# Patient Record
Sex: FEMALE | Race: WHITE | NOT HISPANIC OR LATINO | Employment: OTHER | URBAN - METROPOLITAN AREA
[De-identification: names, ages, dates, MRNs, and addresses within clinical notes are randomized per-mention and may not be internally consistent; named-entity substitution may affect disease eponyms.]

---

## 2017-04-24 ENCOUNTER — ALLSCRIPTS OFFICE VISIT (OUTPATIENT)
Dept: OTHER | Facility: OTHER | Age: 72
End: 2017-04-24

## 2017-05-02 DIAGNOSIS — Z12.31 ENCOUNTER FOR SCREENING MAMMOGRAM FOR MALIGNANT NEOPLASM OF BREAST: ICD-10-CM

## 2017-05-02 DIAGNOSIS — Z13.820 ENCOUNTER FOR SCREENING FOR OSTEOPOROSIS: ICD-10-CM

## 2017-05-02 DIAGNOSIS — E11.9 TYPE 2 DIABETES MELLITUS WITHOUT COMPLICATIONS (HCC): ICD-10-CM

## 2017-05-02 DIAGNOSIS — R41.3 OTHER AMNESIA: ICD-10-CM

## 2017-05-04 ENCOUNTER — GENERIC CONVERSION - ENCOUNTER (OUTPATIENT)
Dept: OTHER | Facility: OTHER | Age: 72
End: 2017-05-04

## 2018-01-01 ENCOUNTER — TELEPHONE (OUTPATIENT)
Dept: FAMILY MEDICINE CLINIC | Facility: CLINIC | Age: 73
End: 2018-01-01

## 2018-01-09 NOTE — PROGRESS NOTES
Assessment    1  DMII (diabetes mellitus, type 2) (250 00) (E11 9)   2  Hypercholesterolemia (272 0) (E78 0)   3  Declined smoking cessation (Z78 9)   4  BMI 29 0-29 9,adult (V85 25) (Z68 29)   5  Essential hypertension (401 9) (I10)   6  Colon cancer screening (V76 51) (Z12 11)   7  Encounter for mammogram to establish baseline mammogram (V76 12) (Z12 31)    Plan   DMII (diabetes mellitus, type 2)    · Lisinopril 20 MG Oral Tablet   · (1) BASIC METABOLIC PROFILE; Status:Active; Requested for:04Mar2016;    · (1) HEMOGLOBIN A1C; Status:Active; Requested for:04Mar2016;    · (1) LIPID PANEL, FASTING; Status:Active; Requested for:04Mar2016;    · (1) MICROALBUMIN CREATININE RATIO, RANDOM URINE; Status:Active; Requested  for:04Mar2016;   Essential hypertension    · Lisinopril-Hydrochlorothiazide 20-12 5 MG Oral Tablet; TAKE 1 TABLET DAILY  Hypercholesterolemia    · Atorvastatin Calcium 80 MG Oral Tablet; take 1 tablet by mouth once daily    Ophthalmology Referral Other Physician Referral  Consult  Status: Hold For - Scheduling  Requested for: [id number]  Ordered; For: DMII (diabetes mellitus, type 2); Ordered By: Gm Chow  Performed:   Due: 56QDW3466; Last Updated By: Gm Chow; 3/4/2016 6:02:55 PM  Podiatry Referral Other Physician Referral  Consult  Status: Hold For - Scheduling  Requested for: [id number]  Ordered; For: DMII (diabetes mellitus, type 2); Ordered By: Gm Chow  Performed:   Due: 09KNO4977; Last Updated By: Gm Chow; 3/4/2016 6:03:42 PM  Gastroenterology Referral Other Physician Referral  Consult  Status: Hold For - Scheduling  Requested for: [id number]  Ordered;    For: Colon cancer screening;  Ordered By: Gm Chow  Performed:   Due: 20GGZ0824; Last Updated By: Gm Chow; 3/4/2016 6:05:03 PM  MAMMO SCREENING RIGHT W CAD; Status:Hold For - Scheduling; Requested for:04Mar2016;   Perform:[facility]; HWS:[id number];ORILEUGENIA;    [place] for mammogram to establish baseline mammogram; Ordered By:Herminia Yuan;      Discussion/Summary  health maintenance visit Currently, she eats an adequate diet  the patient declines cervical cancer screening Breast cancer screening: mammogram has been ordered  Colorectal cancer screening: colonoscopy has been ordered  Screening lab work includes lipid profile  The immunizations are up to date  She was advised to be evaluated by an ophthalmologist  Advice and education were given regarding nutrition and tobacco cessation  #Hypertension- stop lisinopril 20 mg and Lopressor, recheck /90, started on Lisinopril-HCTZ 20-12 5mg, follow up in 2-3 weeks for BP    #Type 2 DM- ordered HbA1C,last HbA1C 6 5 in April 2015, ordered BMP, lipid panel, urine microalbumin    #Hyperlipidemia- refilled atorvastatin    # Colon cancer screening- referral for GI  # Breast cancer screening- referral for mammogram  #Declined smoking cessation     Chief Complaint  CPE      History of Present Illness  HPI: 78year old female with history of Type 2 DM, non-Hodgkin lymphoma, HTN, and hyperlipidemia who is here for annual physical exam  She is did not bring her blood sugar log, but states ranges from 80-130s  She has not had eye or foot exam this year  She checks her feet daily, denies lacerations  She has not taken her Lopressor but has been taking her lisinopril 20mg  Today BP is elevated  She denies headache, vision changes,chest pain, or SOB  Review of Systems    Constitutional: no fever and no chills  ENT: no earache  Cardiovascular: no chest pain and no palpitations  Respiratory: no shortness of breath, no cough and no wheezing  Gastrointestinal: no abdominal pain, no nausea and no diarrhea  Genitourinary: no dysuria  Musculoskeletal: no arthralgias and no myalgias  Integumentary: no rashes  Neurological: no headache  Active Problems    1  Acute myocardial infarction of inferior wall (410 40) (I21 19)   2  Arthralgia of temporomandibular joint (524 62) (M26 62)   3  Asthma (493 90) (J45 909)   4  Chronic obstructive pulmonary disease (496) (J44 9)   5  Dependence On Nicotine In Chewing Tobacco With Nicotine-induced Disorder (305 1)   6  DMII (diabetes mellitus, type 2) (250 00) (E11 9)   7  Edema (782 3) (R60 9)   8  Emphysema (492 8) (J43 9)   9  Essential hypertension (401 9) (I10)   10  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)   11  Hypercholesterolemia (272 0) (E78 0)   12  Hyperkalemia (276 7) (E87 5)   13  Hyperlipidemia (272 4) (E78 5)   14  Malignant Female Breast Neoplasm (174 9)   15  Need for prophylactic vaccination and inoculation against influenza (V04 81) (Z23)   16  Need for vaccination with 13-polyvalent pneumococcal conjugate vaccine (V03 82) (Z23)   17  Normal routine physical examination (V70 0) (Z00 00)   18  Other muscle spasm (728 85) (M62 838)   19  Presenile dementia, depressed type (290 13) (F03 90)   20  Screening for cardiovascular condition (V81 2) (Z13 6)   21  Urge incontinence of urine (788 31) (N39 41)   22  Xerosis cutis (706 8) (L85 3)    Past Medical History    · History of bacterial pneumonia (V12 61) (Z87 01)   · History of bronchitis (V12 69) (Z87 09)   · History of urinary tract infection (V13 02) (Z87 440)   · Need for prophylactic vaccination and inoculation against influenza (V04 81) (Z23)    Surgical History    · History of Breast Surgery Mastectomy    Family History    · Family history of Heart disease    · Family history of Heart disease    Social History    · Current every day smoker (305 1) (F17 200)    Current Meds   1  Albuterol 90 MCG/ACT AERS; INHALE 1 TO 2 PUFFS EVERY 4 TO 6 HOURS AS   NEEDED AND AS DIRECTED; Therapy: (Recorded:31Cvr9713) to Recorded   2  Atorvastatin Calcium 80 MG Oral Tablet; take 1 tablet by mouth once daily; Therapy: 97KTR5888 to (Evaluate:25Mar2016)  Requested for: 49Tvy9157; Last   Rx:59Ibz9877 Ordered   3   Combivent  MCG/ACT AERO; INHALE 1 PUFFS Twice daily; Therapy: [phone]) to Recorded   4  FreeStyle Lite Test STRP; TEST 3 TIMES DAILY; Therapy: [phone]) to Recorded   5  Lisinopril 20 MG Oral Tablet; take 1 tablet by mouth once daily; Therapy: 43RKQ8028 to (Evaluate:16Mar2016)  Requested for: 05PDB2470; Last   Rx:17Nwk0081 Ordered   6  MetFORMIN HCl - 850 MG Oral Tablet; take 1 tablet by mouth twice a day with food; Therapy: 23LYR3505 to (Santiago Serve)  Requested for: 12VJJ5083; Last   Rx:08Jan2016 Ordered   7  RA Aspirin Adult Low Strength 81 MG Oral Tablet Chewable; Take 1 tablet twice daily; Therapy: [phone]) to Recorded   8  Ventolin  (90 Base) MCG/ACT Inhalation Aerosol Solution; INHALE 2 PUFFS   EVERY 4 HOURS AS NEEDED FOR COUGH AND WHEEZE;   Therapy: 89ZQW3575 to (Last Rx:46Juy0037) Ordered    Allergies    1  Percocet TABS    2  No Known Latex Allergies    Vitals   Recorded: 96BHB9026 02:38PM   Temperature 97 4 F   Heart Rate 61   Respiration 18   Systolic 337   Diastolic 90   Height 4 ft 11 in   Weight 148 lb    BMI Calculated 29 89   BSA Calculated 1 62   O2 Saturation 96     Physical Exam    Constitutional   General appearance: No acute distress, well appearing and well nourished  Head and Face   Head and face: Normal     Eyes   Conjunctiva and lids: No swelling, erythema or discharge  Pupils and irises: Equal, round, reactive to light  Ears, Nose, Mouth, and Throat   External inspection of ears and nose: Normal     Otoscopic examination: Tympanic membranes translucent with normal light reflex  Canals patent without erythema  Lips, teeth, and gums: Normal, good dentition  Oropharynx: Normal with no erythema, edema, exudate or lesions  Pulmonary   Respiratory effort: No increased work of breathing or signs of respiratory distress  Auscultation of lungs: Clear to auscultation      Cardiovascular   Auscultation of heart: Normal rate and rhythm, normal S1 and S2, no murmurs  Examination of extremities for edema and/or varicosities: Normal     Abdomen   Abdomen: Non-tender, no masses  Liver and spleen: No hepatomegaly or splenomegaly  Musculoskeletal   Gait and station: Normal     Digits and nails: Normal without clubbing or cyanosis  Joints, bones, and muscles: Normal     Range of motion: Normal     Stability: Normal     Muscle strength/tone: Normal     Psychiatric   Orientation to person, place, and time: Normal     Mood and affect: Normal        Attending Note  Attending Note: Attending Note: I did not interview and examine the patient, I discussed the case with the Resident and reviewed the Resident's note, I supervised the Resident and I agree with the Resident management plan as it was presented to me  Level of Participation: I was present in clinic, but did not examine the patient  Patient's History: 78 y o  female here for f/u of chronic problems (type 2 DM and HTN), and for health maintenance  Not taking Lopressor  Key Parts of the Exam: As per resident's note  Diagnosis and Plan: HTN- BP elevated today, but clinically stable  Add HCTZ to lisinopril as noted above  Re-check BP wihtin one month  DM- clinically stable; labs as above (await results) and ophthalmology visit suggested  Health Maint - mammogram, colon screening as noted above  I agree with the Resident's note  Future Appointments    Date/Time Provider Specialty Site   03/29/2016 11:45 AM Malva Leventhal, M D  Gastroenterology Adult [facility] GASTROENTEROLOGY [address] [place] [address]   Electronically signed by : Ramo Cote DO; Mar  5 2016  1:36PM EST                       (Author)    Electronically signed by :  EFREN Shultz ; Mar  6 2016  3:35PM EST                       (Co-author)

## 2018-01-11 NOTE — RESULT NOTES
Verified Results  (1) BASIC METABOLIC PROFILE [id number] 08:49AM KirstenThirdLovela Canvas Order Number: [id number]      National Kidney Disease Education Program recommendations are as follows:  GFR calculation is accurate only with a steady state creatinine  Chronic Kidney disease less than 60 ml/min/1 73 sq  meters  Kidney failure less than 15 ml/min/1 73 sq  meters  Test Name Result Flag Reference   GLUCOSE,RANDM 93 mg/dL     If the patient is fasting, the ADA then defines impaired fasting glucose as > 100 mg/dL and diabetes as > or equal to 123 mg/dL     SODIUM 139 mmol/L  136-145   POTASSIUM 5 2 mmol/L  3 5-5 3   CHLORIDE 106 mmol/L  100-108   CARBON DIOXIDE 25 mmol/L  21-32   ANION GAP (CALC) 8 mmol/L  4-13   BLOOD UREA NITROGEN 11 mg/dL  5-25   CREATININE 0 73 mg/dL  0 60-1 30   Standardized to IDMS reference method   CALCIUM 9 3 mg/dL  8 3-10 1   eGFR Non-African American      >60 0 ml/min/1 73sq m     (1) LIPID PANEL, FASTING 58PCV1378 08:49AM KirstenYo que Vos Order Number: [id number]      Triglyceride:         Normal              <150 mg/dl       Borderline High    150-199 mg/dl       High               200-499 mg/dl       Very High          >499 mg/dl  Cholesterol:         Desirable        <200 mg/dl      Borderline High  200-239 mg/dl      High             >239 mg/dl  HDL Cholesterol:        High    >59 mg/dL      Low     <41 mg/dL     Test Name Result Flag Reference   CHOLESTEROL 132 mg/dL     HDL,DIRECT 44 mg/dL  40-60   LDL CHOLESTEROL CALCULATED 66 mg/dL  0-100   TRIGLYCERIDES 111 mg/dL  <=150     (1) HEMOGLOBIN A1C 45GRX7925 08:49AM [employer] Order Number: [id number]      5 7-6 4% impaired fasting glucose  >=6 5% diagnosis of diabetes    Falsely low levels are seen in conditions linked to short RBC life span-  hemolytic anemia, and splenomegaly  Falsely elevated levels are seen in situations where there is an increased production of RBC- receipt of erythropoietin or blood transfusions  Adopted from ADA-Clinical Practice Recommendations     Test Name Result Flag Reference   HEMOGLOBIN A1C 6 6 % H 4 0-5 6   EST  AVG   GLUCOSE 143 mg/dl       (1) MICROALBUMIN CREATININE RATIO, RANDOM URINE [id number] 08:49AM Brittany Parr   TW Order Number: [id number]     Test Name Result Flag Reference   MICROALBUMIN/ CREAT R <43 mg/g creatinine H 0-30   MICROALBUMIN,URINE <5 0 mg/L  0 0-20 0   CREATININE URINE 11 5 mg/dL

## 2018-01-12 NOTE — MISCELLANEOUS
Message  Message Free Text Note Form: Attempted to call patient twice with no answer  Could not leave a message as no voicemail available  Signatures   Electronically signed by : Flor Dinero DO; Mar 14 2016  1:24PM EST                       (Author)    Electronically signed by :  EFREN Sanchez ; Mar 14 2016  1:47PM EST                       (Acknowledgement)

## 2018-01-13 VITALS
DIASTOLIC BLOOD PRESSURE: 100 MMHG | SYSTOLIC BLOOD PRESSURE: 140 MMHG | RESPIRATION RATE: 18 BRPM | WEIGHT: 150.5 LBS | OXYGEN SATURATION: 95 % | HEIGHT: 59 IN | HEART RATE: 51 BPM | BODY MASS INDEX: 30.34 KG/M2

## 2018-01-16 NOTE — MISCELLANEOUS
Message   Recorded as Task   Date: 04/25/2017 09:44 AM, Created By: 1634 Marcos Lau   Task Name: Miscellaneous   Assigned To: Neal Rodriguez   Regarding Patient: Diana Stearns, Status: In Progress   Comment:    Alix Maurice - 25 Apr 2017 9:44 AM     TASK CREATED  Ace Lundborg,   I saw this pt on 4/24  She was scheduled for a 15 min appoinment, had multiple issues and on top of it allscripts wasn't working  I did the best I could, but she's coming back to see you on 5/4  Reba Orn have to give her all the scripts I was unable to give her including- CBC, CMP, TSH, RPR, U/A, Lipid Panel, HbA1c, mammography, colonoscopy, DEXA  My note has all other info  Let me know if you have any questions! Emma Gaston - 27 Apr 2017 3:44 PM     TASK EDITED   Jordan Perera - 27 Apr 2017 3:44 PM     TASK IN PROGRESS   Neal Rodriguez - 02 May 2017 4:40 PM     TASK EDITED  Printed required labs and screening prior to appointment   Jordan Perera - 04 May 2017 1:41 PM     TASK EDITED  Patient canceled appointment for today and did not reschedule  Will copy note to chart for future provider          Signatures   Electronically signed by : EFREN Knight ; May  4 2017  1:41PM EST                       (Author)

## 2018-03-30 DIAGNOSIS — E78.5 HYPERLIPIDEMIA, UNSPECIFIED HYPERLIPIDEMIA TYPE: Primary | ICD-10-CM

## 2018-04-01 RX ORDER — ATORVASTATIN CALCIUM 80 MG/1
80 TABLET, FILM COATED ORAL DAILY
Qty: 90 TABLET | Refills: 0 | Status: SHIPPED | OUTPATIENT
Start: 2018-04-01 | End: 2018-09-10 | Stop reason: SDUPTHER

## 2018-09-10 ENCOUNTER — OFFICE VISIT (OUTPATIENT)
Dept: FAMILY MEDICINE CLINIC | Facility: CLINIC | Age: 73
End: 2018-09-10
Payer: MEDICARE

## 2018-09-10 VITALS
WEIGHT: 144 LBS | OXYGEN SATURATION: 95 % | BODY MASS INDEX: 29.08 KG/M2 | DIASTOLIC BLOOD PRESSURE: 86 MMHG | HEART RATE: 78 BPM | RESPIRATION RATE: 20 BRPM | SYSTOLIC BLOOD PRESSURE: 158 MMHG

## 2018-09-10 DIAGNOSIS — I10 HYPERTENSION, UNSPECIFIED TYPE: ICD-10-CM

## 2018-09-10 DIAGNOSIS — E78.5 HYPERLIPIDEMIA, UNSPECIFIED HYPERLIPIDEMIA TYPE: ICD-10-CM

## 2018-09-10 DIAGNOSIS — J44.9 CHRONIC OBSTRUCTIVE PULMONARY DISEASE, UNSPECIFIED COPD TYPE (HCC): ICD-10-CM

## 2018-09-10 DIAGNOSIS — E11.8 TYPE 2 DIABETES MELLITUS WITH COMPLICATION, WITHOUT LONG-TERM CURRENT USE OF INSULIN (HCC): Primary | ICD-10-CM

## 2018-09-10 PROCEDURE — 99213 OFFICE O/P EST LOW 20 MIN: CPT | Performed by: FAMILY MEDICINE

## 2018-09-10 RX ORDER — ALBUTEROL SULFATE 90 UG/1
1-2 AEROSOL, METERED RESPIRATORY (INHALATION)
COMMUNITY
End: 2018-09-10 | Stop reason: SDUPTHER

## 2018-09-10 RX ORDER — ALBUTEROL SULFATE 90 UG/1
1-2 AEROSOL, METERED RESPIRATORY (INHALATION) EVERY 6 HOURS PRN
Qty: 1 INHALER | Refills: 0 | Status: SHIPPED | OUTPATIENT
Start: 2018-09-10 | End: 2019-01-01 | Stop reason: HOSPADM

## 2018-09-10 RX ORDER — LISINOPRIL 20 MG/1
20 TABLET ORAL DAILY
Qty: 90 TABLET | Refills: 0 | Status: SHIPPED | OUTPATIENT
Start: 2018-09-10 | End: 2019-01-01 | Stop reason: SDUPTHER

## 2018-09-10 RX ORDER — ATORVASTATIN CALCIUM 80 MG/1
80 TABLET, FILM COATED ORAL DAILY
Qty: 90 TABLET | Refills: 0 | Status: SHIPPED | OUTPATIENT
Start: 2018-09-10 | End: 2019-01-01 | Stop reason: SDUPTHER

## 2018-09-10 NOTE — PROGRESS NOTES
Assessment/Plan:       Diagnoses and all orders for this visit:    Type 2 diabetes mellitus with complication, without long-term current use of insulin (HCC)  -     HEMOGLOBIN A1C W/ EAG ESTIMATION; Future  -     metFORMIN (GLUCOPHAGE) 850 mg tablet; Take 1 tablet (850 mg total) by mouth 2 (two) times a day with meals  -     Ambulatory referral to Podiatry; Future  - Counseled on importance of diabetic diet and exercise  Hyperlipidemia, unspecified hyperlipidemia type  -     Lipid Panel with Direct LDL reflex; Future  -     atorvastatin (LIPITOR) 80 mg tablet; Take 1 tablet (80 mg total) by mouth daily    Hypertension, unspecified type  -     lisinopril (ZESTRIL) 20 mg tablet; Take 1 tablet (20 mg total) by mouth daily  - Counseled on DASH diet as well as exercise  Chronic obstructive pulmonary disease, unspecified COPD type ([facility])  -     albuterol (PROVENTIL HFA,VENTOLIN HFA) 90 mcg/act inhaler; Inhale 1-2 puffs every 6 (six) hours as needed for wheezing  -     ipratropium-albuterol (COMBIVENT RESPIMAT) inhaler; Inhale 1 puff 2 (two) times a day  - Counseled in detail about benefits of smoking cessation  Subjective:      Patient ID: Jaqui Rojo is a 72 y o  female  HPI  Pamela Ross is a 74 yo F with PMH of breast cancer s/p mastectomy of L breast 13  Years ago, Hodgkins lymphoma in remission, HTN, DM, COPD, HLD who presents today for follow up of her chronic conditions  Pt is here today with her son  # Type II DM: Pt is currently on Metformin 850mg BID  Last HgbA1c 3/17/16 was 6 6  Microalb Cr ratio done at the same time was <43  Pt's blood sugars at night 30 min after dinner are 140s-160s  Has not had eye or foot examination done  Diet/Exercise: Diet consists mainly of carbohydrates  Pt does not exercise    # HLD/CAD: Last lipid panel was done on 3/17/16 and was WNL  Pt states she is currently taking Atorvastatin 80mg qd  Had a stent >10 years ago   Takes daily baby ASA    # HTN: BP in office today is 158/86  She is currently on Lisinopril 20mg daily  Compliant with medications    # COPD: Current every day smoker since she was 27years old, 1 ppd  Has tried to quit in the past, but has been unsuccessful  States she does not want to quit at this time  Understands risks  Was prescribed Combivent and rescue inhaler years ago, but never uses them  Reports no SOB, wheezing, chest tightness  The following portions of the patient's history were reviewed and updated as appropriate: allergies, current medications, past family history, past medical history, past social history, past surgical history and problem list     Review of Systems   Constitutional: Negative for activity change, appetite change, chills, diaphoresis, fatigue and fever  HENT: Negative  Respiratory: Negative for cough, choking, chest tightness, shortness of breath and wheezing  Cardiovascular: Negative for chest pain, palpitations and leg swelling  Gastrointestinal: Negative for abdominal distention, abdominal pain, constipation, diarrhea, nausea and vomiting  Genitourinary: Negative for difficulty urinating, dyspareunia, dysuria, enuresis, flank pain, frequency, menstrual problem, pelvic pain and urgency  Musculoskeletal: Negative for arthralgias, back pain, gait problem, joint swelling, myalgias, neck pain and neck stiffness  Skin: Negative  Neurological: Negative for dizziness, tremors, syncope, facial asymmetry, weakness, light-headedness, numbness and headaches  Psychiatric/Behavioral: Negative  Objective:      /86   Pulse 78   Resp 20   Wt 65 3 kg (144 lb)   SpO2 95%   BMI 29 08 kg/m²          Physical Exam   Constitutional: She is oriented to person, place, and time  She appears well-developed and well-nourished  No distress  HENT:   Head: Normocephalic and atraumatic  Cardiovascular: Normal rate, regular rhythm, normal heart sounds and intact distal pulses    Exam reveals no gallop and no friction rub  No murmur heard  Pulmonary/Chest: Effort normal  No respiratory distress  She has wheezes  She has no rales  She exhibits no tenderness  Musculoskeletal: Normal range of motion  She exhibits no edema, tenderness or deformity  Neurological: She is alert and oriented to person, place, and time  Skin: She is not diaphoretic  Psychiatric: She has a normal mood and affect   Her behavior is normal  Judgment and thought content normal

## 2018-09-10 NOTE — PROGRESS NOTES
I have reviewed the notes, assessments, and/or procedures performed by the resident, I concur with her/his documentation of Vashti Calvillo  Pt seen with resident and I agree with diagnosis and treatment  Pt encouraged to quit smoking  BW ordered

## 2018-09-11 ENCOUNTER — APPOINTMENT (OUTPATIENT)
Dept: LAB | Facility: HOSPITAL | Age: 73
End: 2018-09-11
Payer: MEDICARE

## 2018-09-11 ENCOUNTER — TRANSCRIBE ORDERS (OUTPATIENT)
Dept: ADMINISTRATIVE | Facility: HOSPITAL | Age: 73
End: 2018-09-11

## 2018-09-11 DIAGNOSIS — E11.8 TYPE 2 DIABETES MELLITUS WITH COMPLICATION, WITHOUT LONG-TERM CURRENT USE OF INSULIN (HCC): ICD-10-CM

## 2018-09-11 DIAGNOSIS — E78.5 HYPERLIPIDEMIA, UNSPECIFIED HYPERLIPIDEMIA TYPE: ICD-10-CM

## 2018-09-11 LAB
CHOLEST SERPL-MCNC: 120 MG/DL (ref 50–200)
EST. AVERAGE GLUCOSE BLD GHB EST-MCNC: 137 MG/DL
HBA1C MFR BLD: 6.4 % (ref 4.2–6.3)
HDLC SERPL-MCNC: 35 MG/DL (ref 40–60)
LDLC SERPL CALC-MCNC: 60 MG/DL (ref 0–100)
TRIGL SERPL-MCNC: 126 MG/DL

## 2018-09-11 PROCEDURE — 36415 COLL VENOUS BLD VENIPUNCTURE: CPT

## 2018-09-11 PROCEDURE — 83036 HEMOGLOBIN GLYCOSYLATED A1C: CPT

## 2018-09-11 PROCEDURE — 80061 LIPID PANEL: CPT

## 2018-09-25 ENCOUNTER — OFFICE VISIT (OUTPATIENT)
Dept: PODIATRY | Facility: CLINIC | Age: 73
End: 2018-09-25
Payer: MEDICARE

## 2018-09-25 VITALS
DIASTOLIC BLOOD PRESSURE: 87 MMHG | BODY MASS INDEX: 29.64 KG/M2 | WEIGHT: 151 LBS | HEIGHT: 60 IN | SYSTOLIC BLOOD PRESSURE: 180 MMHG

## 2018-09-25 DIAGNOSIS — G47.62 NOCTURNAL LEG CRAMPS: Primary | ICD-10-CM

## 2018-09-25 DIAGNOSIS — B35.1 ONYCHOMYCOSIS: ICD-10-CM

## 2018-09-25 DIAGNOSIS — E11.42 DIABETIC POLYNEUROPATHY ASSOCIATED WITH TYPE 2 DIABETES MELLITUS (HCC): ICD-10-CM

## 2018-09-25 DIAGNOSIS — L60.0 INGROWN TOENAIL: ICD-10-CM

## 2018-09-25 PROCEDURE — 99203 OFFICE O/P NEW LOW 30 MIN: CPT | Performed by: PODIATRIST

## 2018-09-25 NOTE — PROGRESS NOTES
Assessment/Plan:    Aseptic debridement and planning of nails x10 and manually and mechanically  Discussed importance of staying well hydrated patient will take multivitamin  Discussed importance of aerobic exercise to improve circulation  Daily foot checks monitor for signs of ulceration or infection  Discussed proper shoes, importance of not walking barefoot  Follow-up 3 months     Diagnoses and all orders for this visit:    Nocturnal leg cramps    Diabetic polyneuropathy associated with type 2 diabetes mellitus (HCC)    Onychomycosis    Ingrown toenail          Subjective:      Patient ID: Monica Anthony is a 72 y o  female  Patient has a 10 year history well controlled diabetes  Last A1c 6 4  Patient gets occasional tingling and burning in feet  The patient comes in with concerned about cramping in legs patient says it happened a couple times at night in the past month but happens infrequently  Patient continues to smoke  Patient has thick discolored dystrophic nails  Patient gets recurrent ingrown nails bilateral big toes  Patient has not noticed any redness or signs of infection  Patient does not exercise regularly  Patient denies any history of foot wound or infection  Past Medical History:   Diagnosis Date    Bacterial pneumonia     Last Assessed: 11/14/2013    Breast cancer (HCC)     hx    [facility]     Hyperlipidemia     Hypertension        Past Surgical History:   Procedure Laterality Date    COMPLETE MASTECTOMY W/ SENTINEL NODE BIOPSY Left     Resolved: 6/19/2007       Allergies   Allergen Reactions    Percocet [Oxycodone-Acetaminophen]          Current Outpatient Prescriptions:     albuterol (PROVENTIL HFA,VENTOLIN HFA) 90 mcg/act inhaler, Inhale 1-2 puffs every 6 (six) hours as needed for wheezing, Disp: 1 Inhaler, Rfl: 0    aspirin 81 MG tablet, Take 81 mg by mouth daily  , Disp: , Rfl:     atorvastatin (LIPITOR) 80 mg tablet, Take 1 tablet (80 mg total) by mouth daily, Disp: 90 tablet, Rfl: 0    ipratropium-albuterol (COMBIVENT RESPIMAT) inhaler, Inhale 1 puff 2 (two) times a day, Disp: 4 g, Rfl: 0    lisinopril (ZESTRIL) 20 mg tablet, Take 1 tablet (20 mg total) by mouth daily, Disp: 90 tablet, Rfl: 0    metFORMIN (GLUCOPHAGE) 850 mg tablet, Take 1 tablet (850 mg total) by mouth 2 (two) times a day with meals, Disp: 90 tablet, Rfl: 3    There is no problem list on file for this patient  Review of Systems   Constitutional: Negative  HENT: Negative  Eyes: Negative  Respiratory: Negative  Cardiovascular: Negative  Gastrointestinal: Negative  Endocrine: Negative  Genitourinary: Negative  Musculoskeletal: Negative  Skin: Negative  Allergic/Immunologic: Negative  Neurological: Negative  Hematological: Negative  Psychiatric/Behavioral: Negative  Objective:  Patient's shoes and socks were removed, feet examined  BP (!) 180/87   Ht 4' 11 5" (1 511 m)   Wt 68 5 kg (151 lb)   BMI 29 99 kg/m²          Physical Exam   Cardiovascular:   Pulses:       Dorsalis pedis pulses are 1+ on the right side, and 1+ on the left side  Posterior tibial pulses are 1+ on the right side, and 1+ on the left side  Feet:   Right Foot:   Skin Integrity: Positive for callus  Left Foot:   Skin Integrity: Positive for callus  Foot Exam    General  General Appearance: appears stated age and healthy   Orientation: alert and oriented to person, place, and time   Affect: appropriate   Gait: unimpaired       Right Foot/Ankle     Inspection and Palpation  Skin Exam: callus; Neurovascular  Dorsalis pedis: 1+  Posterior tibial: 1+      Left Foot/Ankle      Inspection and Palpation  Skin Exam: callus; Neurovascular  Dorsalis pedis: 1+  Posterior tibial: 1+            Patient's shoes and socks removed  Right Foot/Ankle   Right Foot Inspection  Skin Exam: callus and callus                            Sensory   Vibration: diminished  Proprioception: intact   Monofilament testing: intact  Vascular  Capillary refills: < 3 seconds  The right DP pulse is 1+  The right PT pulse is 1+  Left Foot/Ankle  Left Foot Inspection  Skin Exam: callus                                         Sensory   Vibration: diminished  Proprioception: intact  Monofilament: intact  Vascular  Capillary refills: < 3 seconds  The left DP pulse is 1+  The left PT pulse is 1+  Assign Risk Category:  Deformity present;  Loss of protective sensation;        Risk: 2    All nails thick discolored dystrophic positive subungual debris positive pain on palpation  Mild ingrown bilateral hallux no sign of infection no exudate  Mild venous stasis mild swelling negative calf tenderness negative Homans sign  Flatfoot bilateral  Equinus bilateral

## 2018-10-22 ENCOUNTER — OFFICE VISIT (OUTPATIENT)
Dept: FAMILY MEDICINE CLINIC | Facility: CLINIC | Age: 73
End: 2018-10-22
Payer: MEDICARE

## 2018-10-22 VITALS
WEIGHT: 142.9 LBS | HEART RATE: 49 BPM | SYSTOLIC BLOOD PRESSURE: 170 MMHG | HEIGHT: 59 IN | OXYGEN SATURATION: 94 % | TEMPERATURE: 97.8 F | BODY MASS INDEX: 28.81 KG/M2 | DIASTOLIC BLOOD PRESSURE: 100 MMHG

## 2018-10-22 DIAGNOSIS — E11.8 TYPE 2 DIABETES MELLITUS WITH COMPLICATION, WITHOUT LONG-TERM CURRENT USE OF INSULIN (HCC): ICD-10-CM

## 2018-10-22 PROCEDURE — 99213 OFFICE O/P EST LOW 20 MIN: CPT | Performed by: FAMILY MEDICINE

## 2018-10-22 RX ORDER — A/SINGAPORE/GP1908/2015 IVR-180 (H1N1) (AN A/MICHIGAN/45/2015 (H1N1)PDM09-LIKE VIRUS), A/HONG KONG/4801/2014, NYMC X-263B (H3N2) (AN A/HONG KONG/4801/2014-LIKE VIRUS), AND B/BRISBANE/60/2008, WILD TYPE (A B/BRISBANE/60/2008-LIKE VIRUS) 15; 15; 15 UG/.5ML; UG/.5ML; UG/.5ML
INJECTION, SUSPENSION INTRAMUSCULAR
Refills: 0 | COMMUNITY
Start: 2018-09-27 | End: 2018-10-29 | Stop reason: ALTCHOICE

## 2018-10-22 NOTE — PROGRESS NOTES
I have reviewed the notes, assessments, and/or procedures performed by the resident, I concur with her/his documentation of Lucia Guevara

## 2018-10-22 NOTE — PROGRESS NOTES
Assessment/Plan:     Diagnoses and all orders for this visit:    Type 2 diabetes mellitus with complication, without long-term current use of insulin (HCC)  -     metFORMIN (GLUCOPHAGE) 500 mg tablet; Take 1 tablet (500 mg total) by mouth daily with breakfast  - Discussed importance of continuing to monitor blood sugars    - Pt and son told to come back next week with their glucometer to assess if it is functioning currently as there have been very low blood sugar readings despite pt being asymptomatic other than minor fatigue  Express understanding  Subjective:      Patient ID: Ashley Fagan is a 72 y o  female  HPI  Brady Chen is a 73 yo F with PMH of well controlled type II DM, hyperlipidemia, hypertension, COPD who presents today to follow up on diabetes mellitus  Type II DM: Pt is currently prescribed Metformin 850mg BID, but due to low blood sugars on home checks, her son has been giving her only 850mg daily  Last hgbA1c on 9/11/18 was very slightly elevated at 6 4 which is improved from prior A1c  Per son who keeps a very detailed log, pt's blood sugars in the AM before meals have been 30's to 60's prior to cutting down Metformin  During these hypoglycemic episodes, pt is completely asymptomatic  Since altering Metformin dosage to 850mg daily, AM sugars are 109-115  Up to date with foot exam  Has not gotten eye exam done yet  Diet: Pt has been working on improving her diet  Eats better portion sizes, but not too little  Watching her carbohydrate intake  Eats cupcakes occasionally  This morning for breakfast ate a bowl of Cheerios and a cupcake  Does not exercise  Hypertension: BP in office today is elevated at 170/100, repeat level decreased at 148/70  Pt is currently on Lisinopril 20mg daily and is compliant with medication  Home blood pressure logs have all blood pressures WNL   Pt states that her BP is elevated in the doctors office since she is nervous, but otherwise always fine      The following portions of the patient's history were reviewed and updated as appropriate: allergies, current medications, past family history, past medical history, past social history, past surgical history and problem list     Review of Systems   Constitutional: Negative for activity change, appetite change, chills, diaphoresis, fatigue and fever  HENT: Negative  Respiratory: Negative for cough, choking, chest tightness, shortness of breath and wheezing  Cardiovascular: Negative for chest pain, palpitations and leg swelling  Gastrointestinal: Negative for abdominal distention, abdominal pain, constipation, diarrhea, nausea and vomiting  Genitourinary: Negative for difficulty urinating, dyspareunia, dysuria, enuresis, flank pain, frequency, menstrual problem, pelvic pain and urgency  Musculoskeletal: Negative for arthralgias, back pain, gait problem, joint swelling, myalgias, neck pain and neck stiffness  Skin: Negative  Neurological: Negative for dizziness, tremors, syncope, facial asymmetry, weakness, light-headedness, numbness and headaches  Psychiatric/Behavioral: Negative  Objective:      /100 (BP Location: Right arm, Patient Position: Sitting, Cuff Size: Large)   Pulse (!) 49   Temp 97 8 °F (36 6 °C) (Tympanic)   Ht 4' 11" (1 499 m)   Wt 64 8 kg (142 lb 14 4 oz)   SpO2 94%   BMI 28 86 kg/m²          Physical Exam   Constitutional: She is oriented to person, place, and time  She appears well-developed and well-nourished  No distress  HENT:   Head: Normocephalic and atraumatic  Cardiovascular: Normal rate, regular rhythm, normal heart sounds and intact distal pulses  Exam reveals no gallop and no friction rub  No murmur heard  Pulmonary/Chest: Effort normal and breath sounds normal  No respiratory distress  She has no wheezes  She has no rales  She exhibits no tenderness  Neurological: She is alert and oriented to person, place, and time     Skin: She is not diaphoretic  Psychiatric: She has a normal mood and affect   Her behavior is normal  Judgment and thought content normal

## 2018-10-29 PROBLEM — Z12.31 ENCOUNTER FOR SCREENING MAMMOGRAM FOR BREAST CANCER: Status: ACTIVE | Noted: 2018-10-29

## 2018-10-29 PROBLEM — Z71.89 ADVANCED DIRECTIVES, COUNSELING/DISCUSSION: Status: ACTIVE | Noted: 2018-10-29

## 2018-10-29 PROBLEM — Z71.6 ENCOUNTER FOR SMOKING CESSATION COUNSELING: Status: ACTIVE | Noted: 2018-10-29

## 2018-10-29 PROBLEM — Z13.5 GLAUCOMA SCREENING: Status: ACTIVE | Noted: 2018-10-29

## 2018-10-29 PROBLEM — Z12.11 COLON CANCER SCREENING: Status: ACTIVE | Noted: 2018-10-29

## 2018-10-29 PROBLEM — Z00.00 MEDICARE ANNUAL WELLNESS VISIT, SUBSEQUENT: Status: ACTIVE | Noted: 2018-10-29

## 2018-10-29 PROBLEM — R32 URINARY INCONTINENCE: Status: ACTIVE | Noted: 2018-10-29

## 2019-01-01 ENCOUNTER — APPOINTMENT (INPATIENT)
Dept: RADIOLOGY | Facility: HOSPITAL | Age: 74
DRG: 841 | End: 2019-01-01
Payer: MEDICARE

## 2019-01-01 ENCOUNTER — APPOINTMENT (INPATIENT)
Dept: NEUROLOGY | Facility: CLINIC | Age: 74
DRG: 070 | End: 2019-01-01
Payer: MEDICARE

## 2019-01-01 ENCOUNTER — HOSPITAL ENCOUNTER (INPATIENT)
Facility: HOSPITAL | Age: 74
LOS: 1 days | DRG: 841 | End: 2019-11-13
Attending: EMERGENCY MEDICINE | Admitting: FAMILY MEDICINE
Payer: MEDICARE

## 2019-01-01 ENCOUNTER — OFFICE VISIT (OUTPATIENT)
Dept: FAMILY MEDICINE CLINIC | Facility: CLINIC | Age: 74
End: 2019-01-01
Payer: MEDICARE

## 2019-01-01 ENCOUNTER — APPOINTMENT (INPATIENT)
Dept: RADIOLOGY | Facility: HOSPITAL | Age: 74
DRG: 070 | End: 2019-01-01
Payer: MEDICARE

## 2019-01-01 ENCOUNTER — TELEPHONE (OUTPATIENT)
Dept: NEUROSURGERY | Facility: CLINIC | Age: 74
End: 2019-01-01

## 2019-01-01 ENCOUNTER — APPOINTMENT (EMERGENCY)
Dept: RADIOLOGY | Facility: HOSPITAL | Age: 74
DRG: 841 | End: 2019-01-01
Payer: MEDICARE

## 2019-01-01 ENCOUNTER — HOSPITAL ENCOUNTER (INPATIENT)
Facility: HOSPITAL | Age: 74
LOS: 6 days | DRG: 070 | End: 2019-11-19
Attending: FAMILY MEDICINE | Admitting: FAMILY MEDICINE
Payer: MEDICARE

## 2019-01-01 ENCOUNTER — APPOINTMENT (INPATIENT)
Dept: NEUROLOGY | Facility: HOSPITAL | Age: 74
DRG: 841 | End: 2019-01-01
Attending: PSYCHIATRY & NEUROLOGY
Payer: MEDICARE

## 2019-01-01 ENCOUNTER — APPOINTMENT (INPATIENT)
Dept: NON INVASIVE DIAGNOSTICS | Facility: HOSPITAL | Age: 74
DRG: 070 | End: 2019-01-01
Payer: MEDICARE

## 2019-01-01 ENCOUNTER — HOSPITAL ENCOUNTER (EMERGENCY)
Facility: HOSPITAL | Age: 74
Discharge: HOME | End: 2019-01-28
Attending: EMERGENCY MEDICINE | Admitting: EMERGENCY MEDICINE
Payer: MEDICARE

## 2019-01-01 VITALS
RESPIRATION RATE: 20 BRPM | BODY MASS INDEX: 27.38 KG/M2 | WEIGHT: 135.8 LBS | HEART RATE: 66 BPM | DIASTOLIC BLOOD PRESSURE: 69 MMHG | OXYGEN SATURATION: 94 % | SYSTOLIC BLOOD PRESSURE: 159 MMHG | HEIGHT: 59 IN | TEMPERATURE: 98.8 F

## 2019-01-01 VITALS
RESPIRATION RATE: 16 BRPM | SYSTOLIC BLOOD PRESSURE: 110 MMHG | DIASTOLIC BLOOD PRESSURE: 52 MMHG | HEIGHT: 59 IN | TEMPERATURE: 32 F | WEIGHT: 147.93 LBS | BODY MASS INDEX: 29.82 KG/M2

## 2019-01-01 VITALS
OXYGEN SATURATION: 94 % | DIASTOLIC BLOOD PRESSURE: 80 MMHG | TEMPERATURE: 98.8 F | HEART RATE: 79 BPM | BODY MASS INDEX: 26.31 KG/M2 | WEIGHT: 130.25 LBS | SYSTOLIC BLOOD PRESSURE: 172 MMHG

## 2019-01-01 VITALS
TEMPERATURE: 98.5 F | HEART RATE: 68 BPM | DIASTOLIC BLOOD PRESSURE: 64 MMHG | BODY MASS INDEX: 29.84 KG/M2 | OXYGEN SATURATION: 96 % | WEIGHT: 148 LBS | HEIGHT: 59 IN | RESPIRATION RATE: 18 BRPM | SYSTOLIC BLOOD PRESSURE: 140 MMHG

## 2019-01-01 VITALS
TEMPERATURE: 99.3 F | BODY MASS INDEX: 28.26 KG/M2 | OXYGEN SATURATION: 94 % | DIASTOLIC BLOOD PRESSURE: 78 MMHG | WEIGHT: 139.9 LBS | SYSTOLIC BLOOD PRESSURE: 142 MMHG | HEART RATE: 75 BPM

## 2019-01-01 DIAGNOSIS — E86.0 DEHYDRATION: Primary | ICD-10-CM

## 2019-01-01 DIAGNOSIS — Z23 ENCOUNTER FOR VACCINATION: Primary | ICD-10-CM

## 2019-01-01 DIAGNOSIS — E78.5 HYPERLIPIDEMIA, UNSPECIFIED HYPERLIPIDEMIA TYPE: ICD-10-CM

## 2019-01-01 DIAGNOSIS — G96.89 CNS MASS: ICD-10-CM

## 2019-01-01 DIAGNOSIS — E11.9 CONTROLLED TYPE 2 DIABETES MELLITUS WITHOUT COMPLICATION, WITHOUT LONG-TERM CURRENT USE OF INSULIN (HCC): ICD-10-CM

## 2019-01-01 DIAGNOSIS — R56.9 SEIZURES (HCC): ICD-10-CM

## 2019-01-01 DIAGNOSIS — N39.0 UTI (URINARY TRACT INFECTION): Primary | ICD-10-CM

## 2019-01-01 DIAGNOSIS — R41.842 VISUAL-SPATIAL IMPAIRMENT: Primary | ICD-10-CM

## 2019-01-01 DIAGNOSIS — R41.0 DISORIENTATION: ICD-10-CM

## 2019-01-01 DIAGNOSIS — R41.82 AMS (ALTERED MENTAL STATUS): ICD-10-CM

## 2019-01-01 DIAGNOSIS — I10 HYPERTENSION, UNSPECIFIED TYPE: ICD-10-CM

## 2019-01-01 DIAGNOSIS — G40.901: ICD-10-CM

## 2019-01-01 DIAGNOSIS — E11.8 TYPE 2 DIABETES MELLITUS WITH COMPLICATION, WITHOUT LONG-TERM CURRENT USE OF INSULIN (HCC): ICD-10-CM

## 2019-01-01 DIAGNOSIS — J44.9 CHRONIC OBSTRUCTIVE PULMONARY DISEASE, UNSPECIFIED COPD TYPE (HCC): ICD-10-CM

## 2019-01-01 DIAGNOSIS — R21 RASH: Primary | ICD-10-CM

## 2019-01-01 DIAGNOSIS — R41.82 ALTERED MENTAL STATUS, UNSPECIFIED ALTERED MENTAL STATUS TYPE: ICD-10-CM

## 2019-01-01 DIAGNOSIS — R11.10 VOMITING: ICD-10-CM

## 2019-01-01 DIAGNOSIS — R41.842 VISUAL-SPATIAL IMPAIRMENT: ICD-10-CM

## 2019-01-01 DIAGNOSIS — E11.9 CONTROLLED TYPE 2 DIABETES MELLITUS WITHOUT COMPLICATION, WITHOUT LONG-TERM CURRENT USE OF INSULIN (HCC): Chronic | ICD-10-CM

## 2019-01-01 DIAGNOSIS — R53.1 WEAKNESS: ICD-10-CM

## 2019-01-01 LAB
AFP-TM SERPL-MCNC: 4.6 NG/ML (ref 0.5–8)
ALBUMIN SERPL BCP-MCNC: 2.8 G/DL (ref 3.5–5)
ALBUMIN SERPL BCP-MCNC: 2.8 G/DL (ref 3.5–5)
ALBUMIN SERPL BCP-MCNC: 3.2 G/DL (ref 3.5–5)
ALBUMIN SERPL BCP-MCNC: 3.4 G/DL (ref 3.5–5)
ALBUMIN SERPL BCP-MCNC: 3.8 G/DL (ref 3.5–5)
ALBUMIN SERPL BCP-MCNC: 4.1 G/DL (ref 3.5–5)
ALP SERPL-CCNC: 101 U/L (ref 46–116)
ALP SERPL-CCNC: 126 U/L (ref 46–116)
ALP SERPL-CCNC: 133 U/L (ref 46–116)
ALP SERPL-CCNC: 47 U/L (ref 46–116)
ALP SERPL-CCNC: 86 U/L (ref 46–116)
ALP SERPL-CCNC: 94 U/L (ref 46–116)
ALP SERPL-CCNC: 95 U/L (ref 46–116)
ALP SERPL-CCNC: 99 U/L (ref 46–116)
ALT SERPL W P-5'-P-CCNC: 17 U/L (ref 12–78)
ALT SERPL W P-5'-P-CCNC: 18 U/L (ref 12–78)
ALT SERPL W P-5'-P-CCNC: 21 U/L (ref 12–78)
ALT SERPL W P-5'-P-CCNC: 22 U/L (ref 12–78)
ALT SERPL W P-5'-P-CCNC: 22 U/L (ref 12–78)
ALT SERPL W P-5'-P-CCNC: 24 U/L (ref 12–78)
ALT SERPL W P-5'-P-CCNC: 24 U/L (ref 12–78)
ALT SERPL W P-5'-P-CCNC: 32 U/L (ref 12–78)
AMORPH URATE CRY URNS QL MICRO: ABNORMAL /HPF
ANION GAP SERPL CALCULATED.3IONS-SCNC: 10 MMOL/L (ref 4–13)
ANION GAP SERPL CALCULATED.3IONS-SCNC: 10 MMOL/L (ref 4–13)
ANION GAP SERPL CALCULATED.3IONS-SCNC: 11 MMOL/L (ref 4–13)
ANION GAP SERPL CALCULATED.3IONS-SCNC: 12 MMOL/L (ref 4–13)
ANION GAP SERPL CALCULATED.3IONS-SCNC: 13 MMOL/L (ref 4–13)
ANION GAP SERPL CALCULATED.3IONS-SCNC: 7 MMOL/L (ref 4–13)
ANION GAP SERPL CALCULATED.3IONS-SCNC: 8 MMOL/L (ref 4–13)
ANION GAP SERPL CALCULATED.3IONS-SCNC: 9 MMOL/L (ref 4–13)
ANION GAP SERPL CALCULATED.3IONS-SCNC: 9 MMOL/L (ref 4–13)
APPEARANCE CSF: CLEAR
APTT PPP: 24 SECONDS (ref 25–32)
ARTERIAL PATENCY WRIST A: NO
ARTERIAL PATENCY WRIST A: YES
ARTERIAL PATENCY WRIST A: YES
AST SERPL W P-5'-P-CCNC: 16 U/L (ref 5–45)
AST SERPL W P-5'-P-CCNC: 19 U/L (ref 5–45)
AST SERPL W P-5'-P-CCNC: 19 U/L (ref 5–45)
AST SERPL W P-5'-P-CCNC: 22 U/L (ref 5–45)
AST SERPL W P-5'-P-CCNC: 25 U/L (ref 5–45)
AST SERPL W P-5'-P-CCNC: 26 U/L (ref 5–45)
AST SERPL W P-5'-P-CCNC: 31 U/L (ref 5–45)
AST SERPL W P-5'-P-CCNC: 44 U/L (ref 5–45)
ATRIAL RATE: 35 BPM
ATRIAL RATE: 55 BPM
ATRIAL RATE: 59 BPM
ATRIAL RATE: 63 BPM
BACTERIA BLD CULT: NORMAL
BACTERIA BLD CULT: NORMAL
BACTERIA CSF CULT: NO GROWTH
BACTERIA UR CULT: NORMAL
BACTERIA UR QL AUTO: ABNORMAL /HPF
BASE EXCESS BLDA CALC-SCNC: -15.3 MMOL/L
BASE EXCESS BLDA CALC-SCNC: -2.7 MMOL/L
BASE EXCESS BLDA CALC-SCNC: -4.4 MMOL/L
BASE EXCESS BLDA CALC-SCNC: -5.7 MMOL/L
BASOPHILS # BLD AUTO: 0.01 THOUSANDS/ΜL (ref 0–0.1)
BASOPHILS # BLD AUTO: 0.01 THOUSANDS/ΜL (ref 0–0.1)
BASOPHILS # BLD AUTO: 0.02 THOUSANDS/ΜL (ref 0–0.1)
BASOPHILS # BLD AUTO: 0.02 THOUSANDS/ΜL (ref 0–0.1)
BASOPHILS # BLD AUTO: 0.03 THOUSANDS/ΜL (ref 0–0.1)
BASOPHILS # BLD AUTO: 0.04 THOUSANDS/ΜL (ref 0–0.1)
BASOPHILS # BLD AUTO: 0.05 THOUSANDS/ΜL (ref 0–0.1)
BASOPHILS NFR BLD AUTO: 0 % (ref 0–1)
BILIRUB DIRECT SERPL-MCNC: 0.1 MG/DL (ref 0–0.2)
BILIRUB SERPL-MCNC: 0.67 MG/DL (ref 0.2–1)
BILIRUB SERPL-MCNC: 0.8 MG/DL (ref 0.2–1)
BILIRUB SERPL-MCNC: 1.1 MG/DL (ref 0.2–1)
BILIRUB SERPL-MCNC: 1.2 MG/DL (ref 0.2–1)
BILIRUB SERPL-MCNC: 1.28 MG/DL (ref 0.2–1)
BILIRUB SERPL-MCNC: 1.29 MG/DL (ref 0.2–1)
BILIRUB SERPL-MCNC: 1.4 MG/DL (ref 0.2–1)
BILIRUB SERPL-MCNC: 1.4 MG/DL (ref 0.2–1)
BILIRUB UR QL STRIP: NEGATIVE
BODY TEMPERATURE: 98.8 DEGREES FEHRENHEIT
BUN SERPL-MCNC: 10 MG/DL (ref 5–25)
BUN SERPL-MCNC: 10 MG/DL (ref 5–25)
BUN SERPL-MCNC: 11 MG/DL (ref 5–25)
BUN SERPL-MCNC: 12 MG/DL (ref 5–25)
BUN SERPL-MCNC: 14 MG/DL (ref 5–25)
BUN SERPL-MCNC: 15 MG/DL (ref 5–25)
BUN SERPL-MCNC: 15 MG/DL (ref 5–25)
BUN SERPL-MCNC: 16 MG/DL (ref 5–25)
BUN SERPL-MCNC: 16 MG/DL (ref 5–25)
BUN SERPL-MCNC: 17 MG/DL (ref 5–25)
BUN SERPL-MCNC: 8 MG/DL (ref 5–25)
BUN SERPL-MCNC: 8 MG/DL (ref 5–25)
BUN SERPL-MCNC: 9 MG/DL (ref 5–25)
CALCIUM SERPL-MCNC: 7.1 MG/DL (ref 8.3–10.1)
CALCIUM SERPL-MCNC: 7.3 MG/DL (ref 8.3–10.1)
CALCIUM SERPL-MCNC: 7.6 MG/DL (ref 8.3–10.1)
CALCIUM SERPL-MCNC: 7.6 MG/DL (ref 8.3–10.1)
CALCIUM SERPL-MCNC: 7.8 MG/DL (ref 8.3–10.1)
CALCIUM SERPL-MCNC: 8.2 MG/DL (ref 8.3–10.1)
CALCIUM SERPL-MCNC: 8.4 MG/DL (ref 8.3–10.1)
CALCIUM SERPL-MCNC: 8.4 MG/DL (ref 8.3–10.1)
CALCIUM SERPL-MCNC: 8.5 MG/DL (ref 8.3–10.1)
CALCIUM SERPL-MCNC: 8.8 MG/DL (ref 8.3–10.1)
CALCIUM SERPL-MCNC: 8.8 MG/DL (ref 8.3–10.1)
CALCIUM SERPL-MCNC: 9.2 MG/DL (ref 8.3–10.1)
CALCIUM SERPL-MCNC: 9.9 MG/DL (ref 8.3–10.1)
CEA SERPL-MCNC: 3.4 NG/ML (ref 0–3)
CHLORIDE SERPL-SCNC: 101 MMOL/L (ref 100–108)
CHLORIDE SERPL-SCNC: 103 MMOL/L (ref 100–108)
CHLORIDE SERPL-SCNC: 104 MMOL/L (ref 100–108)
CHLORIDE SERPL-SCNC: 108 MMOL/L (ref 100–108)
CHLORIDE SERPL-SCNC: 110 MMOL/L (ref 100–108)
CHLORIDE SERPL-SCNC: 111 MMOL/L (ref 100–108)
CHLORIDE SERPL-SCNC: 117 MMOL/L (ref 100–108)
CHLORIDE SERPL-SCNC: 122 MMOL/L (ref 100–108)
CHLORIDE SERPL-SCNC: 124 MMOL/L (ref 100–108)
CHLORIDE SERPL-SCNC: 124 MMOL/L (ref 100–108)
CHLORIDE SERPL-SCNC: 125 MMOL/L (ref 100–108)
CHLORIDE SERPL-SCNC: 126 MMOL/L (ref 100–108)
CHLORIDE SERPL-SCNC: 95 MMOL/L (ref 100–108)
CLARITY UR: ABNORMAL
CLARITY UR: ABNORMAL
CLARITY UR: CLEAR
CO2 SERPL-SCNC: 12 MMOL/L (ref 21–32)
CO2 SERPL-SCNC: 13 MMOL/L (ref 21–32)
CO2 SERPL-SCNC: 14 MMOL/L (ref 21–32)
CO2 SERPL-SCNC: 15 MMOL/L (ref 21–32)
CO2 SERPL-SCNC: 17 MMOL/L (ref 21–32)
CO2 SERPL-SCNC: 19 MMOL/L (ref 21–32)
CO2 SERPL-SCNC: 22 MMOL/L (ref 21–32)
CO2 SERPL-SCNC: 23 MMOL/L (ref 21–32)
CO2 SERPL-SCNC: 24 MMOL/L (ref 21–32)
CO2 SERPL-SCNC: 26 MMOL/L (ref 21–32)
CO2 SERPL-SCNC: 27 MMOL/L (ref 21–32)
COLOR UR: YELLOW
CREAT SERPL-MCNC: 0.71 MG/DL (ref 0.6–1.3)
CREAT SERPL-MCNC: 0.71 MG/DL (ref 0.6–1.3)
CREAT SERPL-MCNC: 0.78 MG/DL (ref 0.6–1.3)
CREAT SERPL-MCNC: 0.79 MG/DL (ref 0.6–1.3)
CREAT SERPL-MCNC: 0.84 MG/DL (ref 0.6–1.3)
CREAT SERPL-MCNC: 0.86 MG/DL (ref 0.6–1.3)
CREAT SERPL-MCNC: 0.97 MG/DL (ref 0.6–1.3)
CREAT SERPL-MCNC: 1.08 MG/DL (ref 0.6–1.3)
CREAT SERPL-MCNC: 1.1 MG/DL (ref 0.6–1.3)
CREAT SERPL-MCNC: 1.39 MG/DL (ref 0.6–1.3)
CREAT SERPL-MCNC: 1.73 MG/DL (ref 0.6–1.3)
CREAT SERPL-MCNC: 2.09 MG/DL (ref 0.6–1.3)
CREAT SERPL-MCNC: 2.35 MG/DL (ref 0.6–1.3)
CRYPTOC AG CSF QL IA: NEGATIVE
EOSINOPHIL # BLD AUTO: 0 THOUSAND/ΜL (ref 0–0.61)
EOSINOPHIL # BLD AUTO: 0.01 THOUSAND/ΜL (ref 0–0.61)
EOSINOPHIL # BLD AUTO: 0.02 THOUSAND/ΜL (ref 0–0.61)
EOSINOPHIL # BLD AUTO: 0.05 THOUSAND/ΜL (ref 0–0.61)
EOSINOPHIL # BLD AUTO: 0.06 THOUSAND/ΜL (ref 0–0.61)
EOSINOPHIL # BLD AUTO: 0.06 THOUSAND/ΜL (ref 0–0.61)
EOSINOPHIL # BLD AUTO: 0.11 THOUSAND/ΜL (ref 0–0.61)
EOSINOPHIL # BLD AUTO: 0.28 THOUSAND/ΜL (ref 0–0.61)
EOSINOPHIL NFR BLD AUTO: 0 % (ref 0–6)
EOSINOPHIL NFR BLD AUTO: 1 % (ref 0–6)
EOSINOPHIL NFR BLD AUTO: 4 % (ref 0–6)
ERYTHROCYTE [DISTWIDTH] IN BLOOD BY AUTOMATED COUNT: 14.1 % (ref 11.6–15.1)
ERYTHROCYTE [DISTWIDTH] IN BLOOD BY AUTOMATED COUNT: 14.6 % (ref 11.6–15.1)
ERYTHROCYTE [DISTWIDTH] IN BLOOD BY AUTOMATED COUNT: 14.8 % (ref 11.6–15.1)
ERYTHROCYTE [DISTWIDTH] IN BLOOD BY AUTOMATED COUNT: 14.8 % (ref 11.6–15.1)
ERYTHROCYTE [DISTWIDTH] IN BLOOD BY AUTOMATED COUNT: 14.9 % (ref 11.6–15.1)
ERYTHROCYTE [DISTWIDTH] IN BLOOD BY AUTOMATED COUNT: 15 % (ref 11.6–15.1)
ERYTHROCYTE [DISTWIDTH] IN BLOOD BY AUTOMATED COUNT: 15.1 % (ref 11.6–15.1)
ERYTHROCYTE [DISTWIDTH] IN BLOOD BY AUTOMATED COUNT: 15.4 % (ref 11.6–15.1)
ERYTHROCYTE [DISTWIDTH] IN BLOOD BY AUTOMATED COUNT: 15.5 % (ref 11.6–15.1)
ERYTHROCYTE [DISTWIDTH] IN BLOOD BY AUTOMATED COUNT: 16 % (ref 11.6–15.1)
ERYTHROCYTE [DISTWIDTH] IN BLOOD BY AUTOMATED COUNT: 16.4 % (ref 11.6–15.1)
EST. AVERAGE GLUCOSE BLD GHB EST-MCNC: 140 MG/DL
FINE GRAN CASTS URNS QL MICRO: ABNORMAL /LPF
GFR SERPL CREATININE-BSD FRML MDRD: 20 ML/MIN/1.73SQ M
GFR SERPL CREATININE-BSD FRML MDRD: 23 ML/MIN/1.73SQ M
GFR SERPL CREATININE-BSD FRML MDRD: 29 ML/MIN/1.73SQ M
GFR SERPL CREATININE-BSD FRML MDRD: 38 ML/MIN/1.73SQ M
GFR SERPL CREATININE-BSD FRML MDRD: 50 ML/MIN/1.73SQ M
GFR SERPL CREATININE-BSD FRML MDRD: 52 ML/MIN/1.73SQ M
GFR SERPL CREATININE-BSD FRML MDRD: 59 ML/MIN/1.73SQ M
GFR SERPL CREATININE-BSD FRML MDRD: 68 ML/MIN/1.73SQ M
GFR SERPL CREATININE-BSD FRML MDRD: 70 ML/MIN/1.73SQ M
GFR SERPL CREATININE-BSD FRML MDRD: 75 ML/MIN/1.73SQ M
GFR SERPL CREATININE-BSD FRML MDRD: 76 ML/MIN/1.73SQ M
GFR SERPL CREATININE-BSD FRML MDRD: 85 ML/MIN/1.73SQ M
GFR SERPL CREATININE-BSD FRML MDRD: 85 ML/MIN/1.73SQ M
GGT SERPL-CCNC: 58 U/L (ref 5–85)
GLUCOSE CSF-MCNC: 66 MG/DL (ref 50–80)
GLUCOSE P FAST SERPL-MCNC: 104 MG/DL (ref 65–99)
GLUCOSE SERPL-MCNC: 101 MG/DL (ref 65–140)
GLUCOSE SERPL-MCNC: 103 MG/DL (ref 65–140)
GLUCOSE SERPL-MCNC: 104 MG/DL (ref 65–140)
GLUCOSE SERPL-MCNC: 108 MG/DL (ref 65–140)
GLUCOSE SERPL-MCNC: 110 MG/DL (ref 65–140)
GLUCOSE SERPL-MCNC: 111 MG/DL (ref 65–140)
GLUCOSE SERPL-MCNC: 112 MG/DL (ref 65–140)
GLUCOSE SERPL-MCNC: 127 MG/DL (ref 65–140)
GLUCOSE SERPL-MCNC: 128 MG/DL (ref 65–140)
GLUCOSE SERPL-MCNC: 130 MG/DL (ref 65–140)
GLUCOSE SERPL-MCNC: 132 MG/DL (ref 65–140)
GLUCOSE SERPL-MCNC: 133 MG/DL (ref 65–140)
GLUCOSE SERPL-MCNC: 141 MG/DL (ref 65–140)
GLUCOSE SERPL-MCNC: 145 MG/DL (ref 65–140)
GLUCOSE SERPL-MCNC: 153 MG/DL (ref 65–140)
GLUCOSE SERPL-MCNC: 155 MG/DL (ref 65–140)
GLUCOSE SERPL-MCNC: 155 MG/DL (ref 65–140)
GLUCOSE SERPL-MCNC: 169 MG/DL (ref 65–140)
GLUCOSE SERPL-MCNC: 169 MG/DL (ref 65–140)
GLUCOSE SERPL-MCNC: 172 MG/DL (ref 65–140)
GLUCOSE SERPL-MCNC: 178 MG/DL (ref 65–140)
GLUCOSE SERPL-MCNC: 188 MG/DL (ref 65–140)
GLUCOSE SERPL-MCNC: 188 MG/DL (ref 65–140)
GLUCOSE SERPL-MCNC: 191 MG/DL (ref 65–140)
GLUCOSE SERPL-MCNC: 192 MG/DL (ref 65–140)
GLUCOSE SERPL-MCNC: 195 MG/DL (ref 65–140)
GLUCOSE SERPL-MCNC: 198 MG/DL (ref 65–140)
GLUCOSE SERPL-MCNC: 208 MG/DL (ref 65–140)
GLUCOSE SERPL-MCNC: 212 MG/DL (ref 65–140)
GLUCOSE SERPL-MCNC: 214 MG/DL (ref 65–140)
GLUCOSE SERPL-MCNC: 221 MG/DL (ref 65–140)
GLUCOSE SERPL-MCNC: 237 MG/DL (ref 65–140)
GLUCOSE SERPL-MCNC: 64 MG/DL (ref 65–140)
GLUCOSE SERPL-MCNC: 64 MG/DL (ref 65–140)
GLUCOSE SERPL-MCNC: 69 MG/DL (ref 65–140)
GLUCOSE SERPL-MCNC: 72 MG/DL (ref 65–140)
GLUCOSE SERPL-MCNC: 76 MG/DL (ref 65–140)
GLUCOSE SERPL-MCNC: 80 MG/DL (ref 65–140)
GLUCOSE SERPL-MCNC: 87 MG/DL (ref 65–140)
GLUCOSE SERPL-MCNC: 89 MG/DL (ref 65–140)
GLUCOSE SERPL-MCNC: 90 MG/DL (ref 65–140)
GLUCOSE SERPL-MCNC: 94 MG/DL (ref 65–140)
GLUCOSE SERPL-MCNC: 95 MG/DL (ref 65–140)
GLUCOSE SERPL-MCNC: 97 MG/DL (ref 65–140)
GLUCOSE SERPL-MCNC: 99 MG/DL (ref 65–140)
GLUCOSE UR STRIP-MCNC: NEGATIVE MG/DL
HBA1C MFR BLD: 6.5 % (ref 4.2–6.3)
HCO3 BLDA-SCNC: 13.3 MMOL/L (ref 22–28)
HCO3 BLDA-SCNC: 17.3 MMOL/L (ref 22–28)
HCO3 BLDA-SCNC: 19.9 MMOL/L (ref 22–28)
HCO3 BLDA-SCNC: 21.2 MMOL/L (ref 22–28)
HCT VFR BLD AUTO: 40.8 % (ref 34.8–46.1)
HCT VFR BLD AUTO: 42.5 % (ref 34.8–46.1)
HCT VFR BLD AUTO: 43 % (ref 34.8–46.1)
HCT VFR BLD AUTO: 43.1 % (ref 34.8–46.1)
HCT VFR BLD AUTO: 43.4 % (ref 34.8–46.1)
HCT VFR BLD AUTO: 45 % (ref 34.8–46.1)
HCT VFR BLD AUTO: 45.2 % (ref 34.8–46.1)
HCT VFR BLD AUTO: 46.1 % (ref 34.8–46.1)
HCT VFR BLD AUTO: 46.6 % (ref 34.8–46.1)
HCT VFR BLD AUTO: 47.1 % (ref 34.8–46.1)
HCT VFR BLD AUTO: 51.3 % (ref 34.8–46.1)
HGB BLD-MCNC: 13.3 G/DL (ref 11.5–15.4)
HGB BLD-MCNC: 13.8 G/DL (ref 11.5–15.4)
HGB BLD-MCNC: 13.8 G/DL (ref 11.5–15.4)
HGB BLD-MCNC: 13.9 G/DL (ref 11.5–15.4)
HGB BLD-MCNC: 14 G/DL (ref 11.5–15.4)
HGB BLD-MCNC: 14.1 G/DL (ref 11.5–15.4)
HGB BLD-MCNC: 14.2 G/DL (ref 11.5–15.4)
HGB BLD-MCNC: 14.3 G/DL (ref 11.5–15.4)
HGB BLD-MCNC: 14.5 G/DL (ref 11.5–15.4)
HGB BLD-MCNC: 14.8 G/DL (ref 11.5–15.4)
HGB BLD-MCNC: 16.4 G/DL (ref 11.5–15.4)
HGB UR QL STRIP.AUTO: ABNORMAL
HIV 1+2 AB+HIV1 P24 AG SERPL QL IA: NORMAL
HOLD SPECIMEN: NORMAL
HOROWITZ INDEX BLDA+IHG-RTO: 40 MM[HG]
HSV1 DNA SPEC QL NAA+PROBE: NEGATIVE
HSV2 DNA SPEC QL NAA+PROBE: NEGATIVE
HYALINE CASTS #/AREA URNS LPF: ABNORMAL /LPF
IMM GRANULOCYTES # BLD AUTO: 0.01 THOUSAND/UL (ref 0–0.2)
IMM GRANULOCYTES # BLD AUTO: 0.02 THOUSAND/UL (ref 0–0.2)
IMM GRANULOCYTES # BLD AUTO: 0.02 THOUSAND/UL (ref 0–0.2)
IMM GRANULOCYTES # BLD AUTO: 0.03 THOUSAND/UL (ref 0–0.2)
IMM GRANULOCYTES # BLD AUTO: 0.04 THOUSAND/UL (ref 0–0.2)
IMM GRANULOCYTES # BLD AUTO: 0.05 THOUSAND/UL (ref 0–0.2)
IMM GRANULOCYTES # BLD AUTO: 0.09 THOUSAND/UL (ref 0–0.2)
IMM GRANULOCYTES # BLD AUTO: 0.22 THOUSAND/UL (ref 0–0.2)
IMM GRANULOCYTES NFR BLD AUTO: 0 % (ref 0–2)
IMM GRANULOCYTES NFR BLD AUTO: 1 % (ref 0–2)
IMM GRANULOCYTES NFR BLD AUTO: 1 % (ref 0–2)
IMM GRANULOCYTES NFR BLD AUTO: 2 % (ref 0–2)
INR PPP: 0.96 (ref 0.91–1.09)
INR PPP: 1.1 (ref 0.84–1.19)
KETONES UR STRIP-MCNC: ABNORMAL MG/DL
KETONES UR STRIP-MCNC: NEGATIVE MG/DL
KETONES UR STRIP-MCNC: NEGATIVE MG/DL
LACTATE SERPL-SCNC: 1.8 MMOL/L (ref 0.5–2)
LACTATE SERPL-SCNC: 3.9 MMOL/L (ref 0.5–2)
LEUKOCYTE ESTERASE UR QL STRIP: ABNORMAL
LEUKOCYTE ESTERASE UR QL STRIP: ABNORMAL
LEUKOCYTE ESTERASE UR QL STRIP: NEGATIVE
LIPASE SERPL-CCNC: 204 U/L (ref 73–393)
LYMPHOCYTES # BLD AUTO: 0.45 THOUSANDS/ΜL (ref 0.6–4.47)
LYMPHOCYTES # BLD AUTO: 0.64 THOUSANDS/ΜL (ref 0.6–4.47)
LYMPHOCYTES # BLD AUTO: 0.82 THOUSANDS/ΜL (ref 0.6–4.47)
LYMPHOCYTES # BLD AUTO: 1.43 THOUSANDS/ΜL (ref 0.6–4.47)
LYMPHOCYTES # BLD AUTO: 2.23 THOUSANDS/ΜL (ref 0.6–4.47)
LYMPHOCYTES # BLD AUTO: 2.24 THOUSANDS/ΜL (ref 0.6–4.47)
LYMPHOCYTES # BLD AUTO: 2.32 THOUSANDS/ΜL (ref 0.6–4.47)
LYMPHOCYTES # BLD AUTO: 2.36 THOUSANDS/ΜL (ref 0.6–4.47)
LYMPHOCYTES # BLD AUTO: 2.54 THOUSANDS/ΜL (ref 0.6–4.47)
LYMPHOCYTES # BLD AUTO: 2.71 THOUSANDS/ΜL (ref 0.6–4.47)
LYMPHOCYTES NFR BLD AUTO: 14 % (ref 14–44)
LYMPHOCYTES NFR BLD AUTO: 18 % (ref 14–44)
LYMPHOCYTES NFR BLD AUTO: 23 % (ref 14–44)
LYMPHOCYTES NFR BLD AUTO: 25 % (ref 14–44)
LYMPHOCYTES NFR BLD AUTO: 28 % (ref 14–44)
LYMPHOCYTES NFR BLD AUTO: 29 % (ref 14–44)
LYMPHOCYTES NFR BLD AUTO: 29 % (ref 14–44)
LYMPHOCYTES NFR BLD AUTO: 3 % (ref 14–44)
LYMPHOCYTES NFR BLD AUTO: 6 % (ref 14–44)
LYMPHOCYTES NFR BLD AUTO: 9 % (ref 14–44)
LYMPHOCYTES NFR CSF MANUAL: 36 %
MAGNESIUM SERPL-MCNC: 1.7 MG/DL (ref 1.6–2.6)
MAGNESIUM SERPL-MCNC: 1.8 MG/DL (ref 1.6–2.6)
MAGNESIUM SERPL-MCNC: 1.8 MG/DL (ref 1.6–2.6)
MAGNESIUM SERPL-MCNC: 1.9 MG/DL (ref 1.6–2.6)
MAGNESIUM SERPL-MCNC: 1.9 MG/DL (ref 1.6–2.6)
MAGNESIUM SERPL-MCNC: 2 MG/DL (ref 1.6–2.6)
MAGNESIUM SERPL-MCNC: 2 MG/DL (ref 1.6–2.6)
MAGNESIUM SERPL-MCNC: 2.1 MG/DL (ref 1.6–2.6)
MCH RBC QN AUTO: 30.6 PG (ref 26.8–34.3)
MCH RBC QN AUTO: 30.6 PG (ref 26.8–34.3)
MCH RBC QN AUTO: 30.9 PG (ref 26.8–34.3)
MCH RBC QN AUTO: 31 PG (ref 26.8–34.3)
MCH RBC QN AUTO: 31 PG (ref 26.8–34.3)
MCH RBC QN AUTO: 31.1 PG (ref 26.8–34.3)
MCH RBC QN AUTO: 31.2 PG (ref 26.8–34.3)
MCH RBC QN AUTO: 31.4 PG (ref 26.8–34.3)
MCH RBC QN AUTO: 31.6 PG (ref 26.8–34.3)
MCHC RBC AUTO-ENTMCNC: 30.4 G/DL (ref 31.4–37.4)
MCHC RBC AUTO-ENTMCNC: 30.8 G/DL (ref 31.4–37.4)
MCHC RBC AUTO-ENTMCNC: 30.8 G/DL (ref 31.4–37.4)
MCHC RBC AUTO-ENTMCNC: 31.8 G/DL (ref 31.4–37.4)
MCHC RBC AUTO-ENTMCNC: 31.8 G/DL (ref 31.4–37.4)
MCHC RBC AUTO-ENTMCNC: 32 G/DL (ref 31.4–37.4)
MCHC RBC AUTO-ENTMCNC: 32.2 G/DL (ref 31.4–37.4)
MCHC RBC AUTO-ENTMCNC: 32.5 G/DL (ref 31.4–37.4)
MCHC RBC AUTO-ENTMCNC: 32.5 G/DL (ref 31.4–37.4)
MCHC RBC AUTO-ENTMCNC: 32.6 G/DL (ref 31.4–37.4)
MCHC RBC AUTO-ENTMCNC: 32.8 G/DL (ref 31.4–37.4)
MCV RBC AUTO: 100 FL (ref 82–98)
MCV RBC AUTO: 101 FL (ref 82–98)
MCV RBC AUTO: 103 FL (ref 82–98)
MCV RBC AUTO: 95 FL (ref 82–98)
MCV RBC AUTO: 95 FL (ref 82–98)
MCV RBC AUTO: 96 FL (ref 82–98)
MCV RBC AUTO: 97 FL (ref 82–98)
MCV RBC AUTO: 97 FL (ref 82–98)
MONOCYTES # BLD AUTO: 0.13 THOUSAND/ΜL (ref 0.17–1.22)
MONOCYTES # BLD AUTO: 0.37 THOUSAND/ΜL (ref 0.17–1.22)
MONOCYTES # BLD AUTO: 0.7 THOUSAND/ΜL (ref 0.17–1.22)
MONOCYTES # BLD AUTO: 0.96 THOUSAND/ΜL (ref 0.17–1.22)
MONOCYTES # BLD AUTO: 1.04 THOUSAND/ΜL (ref 0.17–1.22)
MONOCYTES # BLD AUTO: 1.05 THOUSAND/ΜL (ref 0.17–1.22)
MONOCYTES # BLD AUTO: 1.1 THOUSAND/ΜL (ref 0.17–1.22)
MONOCYTES # BLD AUTO: 1.14 THOUSAND/ΜL (ref 0.17–1.22)
MONOCYTES # BLD AUTO: 1.2 THOUSAND/ΜL (ref 0.17–1.22)
MONOCYTES # BLD AUTO: 1.2 THOUSAND/ΜL (ref 0.17–1.22)
MONOCYTES NFR BLD AUTO: 1 % (ref 4–12)
MONOCYTES NFR BLD AUTO: 11 % (ref 4–12)
MONOCYTES NFR BLD AUTO: 12 % (ref 4–12)
MONOCYTES NFR BLD AUTO: 13 % (ref 4–12)
MONOCYTES NFR BLD AUTO: 4 % (ref 4–12)
MONOCYTES NFR BLD AUTO: 7 % (ref 4–12)
MONOCYTES NFR BLD AUTO: 7 % (ref 4–12)
MONOCYTES NFR BLD AUTO: 9 % (ref 4–12)
MONOS+MACROS CSF MANUAL: 43 %
MRSA NOSE QL CULT: NORMAL
MUCOUS THREADS UR QL AUTO: ABNORMAL
MUCOUS THREADS UR QL AUTO: ABNORMAL
NEUTROPHILS # BLD AUTO: 13.75 THOUSANDS/ΜL (ref 1.85–7.62)
NEUTROPHILS # BLD AUTO: 4.25 THOUSANDS/ΜL (ref 1.85–7.62)
NEUTROPHILS # BLD AUTO: 4.73 THOUSANDS/ΜL (ref 1.85–7.62)
NEUTROPHILS # BLD AUTO: 5.43 THOUSANDS/ΜL (ref 1.85–7.62)
NEUTROPHILS # BLD AUTO: 6.32 THOUSANDS/ΜL (ref 1.85–7.62)
NEUTROPHILS # BLD AUTO: 6.64 THOUSANDS/ΜL (ref 1.85–7.62)
NEUTROPHILS # BLD AUTO: 8 THOUSANDS/ΜL (ref 1.85–7.62)
NEUTROPHILS # BLD AUTO: 8.4 THOUSANDS/ΜL (ref 1.85–7.62)
NEUTROPHILS # BLD AUTO: 9.32 THOUSANDS/ΜL (ref 1.85–7.62)
NEUTROPHILS # BLD AUTO: 9.41 THOUSANDS/ΜL (ref 1.85–7.62)
NEUTROPHILS NFR CSF MANUAL: 21 %
NEUTS SEG NFR BLD AUTO: 55 % (ref 43–75)
NEUTS SEG NFR BLD AUTO: 58 % (ref 43–75)
NEUTS SEG NFR BLD AUTO: 58 % (ref 43–75)
NEUTS SEG NFR BLD AUTO: 63 % (ref 43–75)
NEUTS SEG NFR BLD AUTO: 65 % (ref 43–75)
NEUTS SEG NFR BLD AUTO: 70 % (ref 43–75)
NEUTS SEG NFR BLD AUTO: 82 % (ref 43–75)
NEUTS SEG NFR BLD AUTO: 86 % (ref 43–75)
NEUTS SEG NFR BLD AUTO: 89 % (ref 43–75)
NEUTS SEG NFR BLD AUTO: 90 % (ref 43–75)
NITRITE UR QL STRIP: NEGATIVE
NON-SQ EPI CELLS URNS QL MICRO: ABNORMAL /HPF
NRBC BLD AUTO-RTO: 0 /100 WBCS
O2 CT BLDA-SCNC: 18.6 ML/DL (ref 95–100)
O2 CT BLDA-SCNC: 18.9 ML/DL (ref 16–23)
O2 CT BLDA-SCNC: 19 ML/DL (ref 16–23)
O2 CT BLDA-SCNC: 21.2 ML/DL (ref 16–23)
OXYHGB MFR BLDA: 89.9 % (ref 94–97)
OXYHGB MFR BLDA: 91.8 % (ref 94–97)
OXYHGB MFR BLDA: 97 % (ref 94–97)
OXYHGB MFR BLDA: 98.2 % (ref 94–97)
P AXIS: 68 DEGREES
P AXIS: 72 DEGREES
P AXIS: 73 DEGREES
P AXIS: 77 DEGREES
PCO2 BLDA: 28.1 MM HG (ref 36–44)
PCO2 BLDA: 34.3 MM HG (ref 36–44)
PCO2 BLDA: 34.6 MM HG (ref 36–44)
PCO2 BLDA: 41.1 MM HG (ref 36–44)
PCO2 TEMP ADJ BLDA: 41.3 MM HG (ref 36–44)
PEEP RESPIRATORY: 5 CM[H2O]
PH BLD: 7.13 [PH] (ref 7.35–7.45)
PH BLDA: 7.13 [PH] (ref 7.35–7.45)
PH BLDA: 7.38 [PH] (ref 7.35–7.45)
PH BLDA: 7.41 [PH] (ref 7.35–7.45)
PH BLDA: 7.41 [PH] (ref 7.35–7.45)
PH UR STRIP.AUTO: 6 [PH]
PH UR STRIP.AUTO: 6 [PH]
PH UR STRIP.AUTO: 7 [PH] (ref 5–9)
PHOSPHATE SERPL-MCNC: 2.1 MG/DL (ref 2.3–4.1)
PHOSPHATE SERPL-MCNC: 2.3 MG/DL (ref 2.3–4.1)
PHOSPHATE SERPL-MCNC: 2.5 MG/DL (ref 2.3–4.1)
PHOSPHATE SERPL-MCNC: 2.6 MG/DL (ref 2.3–4.1)
PHOSPHATE SERPL-MCNC: 2.6 MG/DL (ref 2.3–4.1)
PHOSPHATE SERPL-MCNC: 2.9 MG/DL (ref 2.3–4.1)
PHOSPHATE SERPL-MCNC: 3.2 MG/DL (ref 2.3–4.1)
PLATELET # BLD AUTO: 143 THOUSANDS/UL (ref 149–390)
PLATELET # BLD AUTO: 157 THOUSANDS/UL (ref 149–390)
PLATELET # BLD AUTO: 158 THOUSANDS/UL (ref 149–390)
PLATELET # BLD AUTO: 166 THOUSANDS/UL (ref 149–390)
PLATELET # BLD AUTO: 167 THOUSANDS/UL (ref 149–390)
PLATELET # BLD AUTO: 168 THOUSANDS/UL (ref 149–390)
PLATELET # BLD AUTO: 171 THOUSANDS/UL (ref 149–390)
PLATELET # BLD AUTO: 173 THOUSANDS/UL (ref 149–390)
PLATELET # BLD AUTO: 174 THOUSANDS/UL (ref 149–390)
PLATELET # BLD AUTO: 176 THOUSANDS/UL (ref 149–390)
PLATELET # BLD AUTO: 190 THOUSANDS/UL (ref 149–390)
PLATELET # BLD AUTO: 193 THOUSANDS/UL (ref 149–390)
PLATELET # BLD AUTO: 194 THOUSANDS/UL (ref 149–390)
PLATELET # BLD AUTO: 224 THOUSANDS/UL (ref 149–390)
PMV BLD AUTO: 10.1 FL (ref 8.9–12.7)
PMV BLD AUTO: 10.2 FL (ref 8.9–12.7)
PMV BLD AUTO: 10.3 FL (ref 8.9–12.7)
PMV BLD AUTO: 10.4 FL (ref 8.9–12.7)
PMV BLD AUTO: 10.6 FL (ref 8.9–12.7)
PMV BLD AUTO: 10.7 FL (ref 8.9–12.7)
PMV BLD AUTO: 10.7 FL (ref 8.9–12.7)
PMV BLD AUTO: 11 FL (ref 8.9–12.7)
PMV BLD AUTO: 11.2 FL (ref 8.9–12.7)
PMV BLD AUTO: 9.9 FL (ref 8.9–12.7)
PO2 BLD: 73.3 MM HG (ref 75–129)
PO2 BLDA: 107.9 MM HG (ref 75–129)
PO2 BLDA: 155 MM HG (ref 75–129)
PO2 BLDA: 64 MM HG (ref 75–129)
PO2 BLDA: 72.8 MM HG (ref 75–129)
POTASSIUM SERPL-SCNC: 3.3 MMOL/L (ref 3.5–5.3)
POTASSIUM SERPL-SCNC: 3.4 MMOL/L (ref 3.5–5.3)
POTASSIUM SERPL-SCNC: 3.9 MMOL/L (ref 3.5–5.3)
POTASSIUM SERPL-SCNC: 4 MMOL/L (ref 3.5–5.3)
POTASSIUM SERPL-SCNC: 4 MMOL/L (ref 3.5–5.3)
POTASSIUM SERPL-SCNC: 4.1 MMOL/L (ref 3.5–5.3)
POTASSIUM SERPL-SCNC: 4.3 MMOL/L (ref 3.5–5.3)
POTASSIUM SERPL-SCNC: 5.6 MMOL/L (ref 3.5–5.3)
PR INTERVAL: 148 MS
PR INTERVAL: 148 MS
PR INTERVAL: 156 MS
PR INTERVAL: 175 MS
PROCALCITONIN SERPL-MCNC: 0.16 NG/ML
PROCALCITONIN SERPL-MCNC: <0.05 NG/ML
PROT CSF-MCNC: 34 MG/DL (ref 15–45)
PROT SERPL-MCNC: 5.2 G/DL (ref 6.4–8.2)
PROT SERPL-MCNC: 6.5 G/DL (ref 6.4–8.2)
PROT SERPL-MCNC: 6.6 G/DL (ref 6.4–8.2)
PROT SERPL-MCNC: 6.6 G/DL (ref 6.4–8.2)
PROT SERPL-MCNC: 6.7 G/DL (ref 6.4–8.2)
PROT SERPL-MCNC: 6.7 G/DL (ref 6.4–8.2)
PROT SERPL-MCNC: 8.1 G/DL (ref 6.4–8.2)
PROT SERPL-MCNC: 8.3 G/DL (ref 6.4–8.2)
PROT UR STRIP-MCNC: ABNORMAL MG/DL
PROT UR STRIP-MCNC: ABNORMAL MG/DL
PROT UR STRIP-MCNC: NEGATIVE MG/DL
PROTHROMBIN TIME: 10.3 SECONDS (ref 9.8–12)
PROTHROMBIN TIME: 13.8 SECONDS (ref 11.6–14.5)
QRS AXIS: -58 DEGREES
QRS AXIS: -61 DEGREES
QRS AXIS: -64 DEGREES
QRS AXIS: 21 DEGREES
QRSD INTERVAL: 132 MS
QRSD INTERVAL: 134 MS
QRSD INTERVAL: 146 MS
QRSD INTERVAL: 150 MS
QT INTERVAL: 444 MS
QT INTERVAL: 460 MS
QT INTERVAL: 462 MS
QT INTERVAL: 608 MS
QTC INTERVAL: 440 MS
QTC INTERVAL: 454 MS
QTC INTERVAL: 457 MS
QTC INTERVAL: 464 MS
RBC # BLD AUTO: 4.21 MILLION/UL (ref 3.81–5.12)
RBC # BLD AUTO: 4.45 MILLION/UL (ref 3.81–5.12)
RBC # BLD AUTO: 4.46 MILLION/UL (ref 3.81–5.12)
RBC # BLD AUTO: 4.51 MILLION/UL (ref 3.81–5.12)
RBC # BLD AUTO: 4.53 MILLION/UL (ref 3.81–5.12)
RBC # BLD AUTO: 4.54 MILLION/UL (ref 3.81–5.12)
RBC # BLD AUTO: 4.56 MILLION/UL (ref 3.81–5.12)
RBC # BLD AUTO: 4.59 MILLION/UL (ref 3.81–5.12)
RBC # BLD AUTO: 4.7 MILLION/UL (ref 3.81–5.12)
RBC # BLD AUTO: 4.84 MILLION/UL (ref 3.81–5.12)
RBC # BLD AUTO: 5.29 MILLION/UL (ref 3.81–5.12)
RBC #/AREA URNS AUTO: ABNORMAL /HPF
SCAN RESULT: NORMAL
SL AMB  POCT GLUCOSE, UA: NORMAL
SL AMB LEUKOCYTE ESTERASE,UA: NORMAL
SL AMB POCT BILIRUBIN,UA: NORMAL
SL AMB POCT BLOOD,UA: NORMAL
SL AMB POCT CLARITY,UA: CLEAR
SL AMB POCT COLOR,UA: YELLOW
SL AMB POCT KETONES,UA: NORMAL
SL AMB POCT NITRITE,UA: NORMAL
SL AMB POCT PH,UA: 8
SL AMB POCT SPECIFIC GRAVITY,UA: 1.01
SL AMB POCT URINE PROTEIN: NORMAL
SL AMB POCT UROBILINOGEN: 1
SODIUM SERPL-SCNC: 134 MMOL/L (ref 136–145)
SODIUM SERPL-SCNC: 135 MMOL/L (ref 136–145)
SODIUM SERPL-SCNC: 136 MMOL/L (ref 136–145)
SODIUM SERPL-SCNC: 137 MMOL/L (ref 136–145)
SODIUM SERPL-SCNC: 138 MMOL/L (ref 136–145)
SODIUM SERPL-SCNC: 142 MMOL/L (ref 136–145)
SODIUM SERPL-SCNC: 142 MMOL/L (ref 136–145)
SODIUM SERPL-SCNC: 147 MMOL/L (ref 136–145)
SODIUM SERPL-SCNC: 147 MMOL/L (ref 136–145)
SODIUM SERPL-SCNC: 150 MMOL/L (ref 136–145)
SODIUM SERPL-SCNC: 151 MMOL/L (ref 136–145)
SODIUM SERPL-SCNC: 152 MMOL/L (ref 136–145)
SODIUM SERPL-SCNC: 152 MMOL/L (ref 136–145)
SP GR UR STRIP.AUTO: 1.02 (ref 1–1.03)
SP GR UR STRIP.AUTO: 1.02 (ref 1–1.03)
SP GR UR STRIP.AUTO: >=1.03 (ref 1–1.03)
SPECIMEN SOURCE: ABNORMAL
T WAVE AXIS: 36 DEGREES
T WAVE AXIS: 48 DEGREES
T WAVE AXIS: 52 DEGREES
T WAVE AXIS: 57 DEGREES
TOTAL CELLS COUNTED BLD: NO
TOTAL CELLS COUNTED SPEC: 75
TRIGL SERPL-MCNC: 158 MG/DL
TROPONIN I SERPL-MCNC: 0.04 NG/ML
TSH SERPL DL<=0.05 MIU/L-ACNC: 0.55 UIU/ML (ref 0.36–3.74)
TUBE # CSF: 4
UROBILINOGEN UR QL STRIP.AUTO: 1 E.U./DL
VALPROATE SERPL-MCNC: 133 UG/ML (ref 50–100)
VALPROATE SERPL-MCNC: 66 UG/ML (ref 50–100)
VENT AC: 16
VENT- AC: AC
VENTRICULAR RATE: 35 BPM
VENTRICULAR RATE: 55 BPM
VENTRICULAR RATE: 59 BPM
VENTRICULAR RATE: 63 BPM
VT SETTING VENT: 400 ML
WBC # BLD AUTO: 10.14 THOUSAND/UL (ref 4.31–10.16)
WBC # BLD AUTO: 10.28 THOUSAND/UL (ref 4.31–10.16)
WBC # BLD AUTO: 10.29 THOUSAND/UL (ref 4.31–10.16)
WBC # BLD AUTO: 10.72 THOUSAND/UL (ref 4.31–10.16)
WBC # BLD AUTO: 13.26 THOUSAND/UL (ref 4.31–10.16)
WBC # BLD AUTO: 15.35 THOUSAND/UL (ref 4.31–10.16)
WBC # BLD AUTO: 17.5 THOUSAND/UL (ref 4.31–10.16)
WBC # BLD AUTO: 7.77 THOUSAND/UL (ref 4.31–10.16)
WBC # BLD AUTO: 8.1 THOUSAND/UL (ref 4.31–10.16)
WBC # BLD AUTO: 9 THOUSAND/UL (ref 4.31–10.16)
WBC # BLD AUTO: 9.39 THOUSAND/UL (ref 4.31–10.16)
WBC # CSF AUTO: 2 /UL (ref 0–5)
WBC #/AREA URNS AUTO: ABNORMAL /HPF

## 2019-01-01 PROCEDURE — 02HV33Z INSERTION OF INFUSION DEVICE INTO SUPERIOR VENA CAVA, PERCUTANEOUS APPROACH: ICD-10-PCS | Performed by: INTERNAL MEDICINE

## 2019-01-01 PROCEDURE — 80053 COMPREHEN METABOLIC PANEL: CPT | Performed by: STUDENT IN AN ORGANIZED HEALTH CARE EDUCATION/TRAINING PROGRAM

## 2019-01-01 PROCEDURE — 99223 1ST HOSP IP/OBS HIGH 75: CPT | Performed by: INTERNAL MEDICINE

## 2019-01-01 PROCEDURE — G8997 SWALLOW GOAL STATUS: HCPCS

## 2019-01-01 PROCEDURE — 97167 OT EVAL HIGH COMPLEX 60 MIN: CPT

## 2019-01-01 PROCEDURE — 82805 BLOOD GASES W/O2 SATURATION: CPT | Performed by: FAMILY MEDICINE

## 2019-01-01 PROCEDURE — 36620 INSERTION CATHETER ARTERY: CPT

## 2019-01-01 PROCEDURE — 80048 BASIC METABOLIC PNL TOTAL CA: CPT | Performed by: NURSE PRACTITIONER

## 2019-01-01 PROCEDURE — 71045 X-RAY EXAM CHEST 1 VIEW: CPT

## 2019-01-01 PROCEDURE — 97530 THERAPEUTIC ACTIVITIES: CPT

## 2019-01-01 PROCEDURE — 81002 URINALYSIS NONAUTO W/O SCOPE: CPT | Performed by: FAMILY MEDICINE

## 2019-01-01 PROCEDURE — 84100 ASSAY OF PHOSPHORUS: CPT | Performed by: STUDENT IN AN ORGANIZED HEALTH CARE EDUCATION/TRAINING PROGRAM

## 2019-01-01 PROCEDURE — 85610 PROTHROMBIN TIME: CPT | Performed by: PHYSICIAN ASSISTANT

## 2019-01-01 PROCEDURE — 36415 COLL VENOUS BLD VENIPUNCTURE: CPT | Performed by: EMERGENCY MEDICINE

## 2019-01-01 PROCEDURE — G8996 SWALLOW CURRENT STATUS: HCPCS

## 2019-01-01 PROCEDURE — 84145 PROCALCITONIN (PCT): CPT | Performed by: STUDENT IN AN ORGANIZED HEALTH CARE EDUCATION/TRAINING PROGRAM

## 2019-01-01 PROCEDURE — 99222 1ST HOSP IP/OBS MODERATE 55: CPT | Performed by: FAMILY MEDICINE

## 2019-01-01 PROCEDURE — 94003 VENT MGMT INPAT SUBQ DAY: CPT

## 2019-01-01 PROCEDURE — 99233 SBSQ HOSP IP/OBS HIGH 50: CPT | Performed by: PSYCHIATRY & NEUROLOGY

## 2019-01-01 PROCEDURE — C9113 INJ PANTOPRAZOLE SODIUM, VIA: HCPCS | Performed by: NURSE PRACTITIONER

## 2019-01-01 PROCEDURE — 84100 ASSAY OF PHOSPHORUS: CPT | Performed by: EMERGENCY MEDICINE

## 2019-01-01 PROCEDURE — 83036 HEMOGLOBIN GLYCOSYLATED A1C: CPT | Performed by: STUDENT IN AN ORGANIZED HEALTH CARE EDUCATION/TRAINING PROGRAM

## 2019-01-01 PROCEDURE — 96374 THER/PROPH/DIAG INJ IV PUSH: CPT

## 2019-01-01 PROCEDURE — 94760 N-INVAS EAR/PLS OXIMETRY 1: CPT | Performed by: SOCIAL WORKER

## 2019-01-01 PROCEDURE — 94760 N-INVAS EAR/PLS OXIMETRY 1: CPT

## 2019-01-01 PROCEDURE — 87529 HSV DNA AMP PROBE: CPT | Performed by: STUDENT IN AN ORGANIZED HEALTH CARE EDUCATION/TRAINING PROGRAM

## 2019-01-01 PROCEDURE — 85025 COMPLETE CBC W/AUTO DIFF WBC: CPT | Performed by: STUDENT IN AN ORGANIZED HEALTH CARE EDUCATION/TRAINING PROGRAM

## 2019-01-01 PROCEDURE — 87070 CULTURE OTHR SPECIMN AEROBIC: CPT | Performed by: STUDENT IN AN ORGANIZED HEALTH CARE EDUCATION/TRAINING PROGRAM

## 2019-01-01 PROCEDURE — 95951 PR EEG MONITORING/VIDEORECORD: CPT | Performed by: PSYCHIATRY & NEUROLOGY

## 2019-01-01 PROCEDURE — 94002 VENT MGMT INPAT INIT DAY: CPT | Performed by: SOCIAL WORKER

## 2019-01-01 PROCEDURE — 80164 ASSAY DIPROPYLACETIC ACD TOT: CPT | Performed by: EMERGENCY MEDICINE

## 2019-01-01 PROCEDURE — 009U3ZX DRAINAGE OF SPINAL CANAL, PERCUTANEOUS APPROACH, DIAGNOSTIC: ICD-10-PCS | Performed by: RADIOLOGY

## 2019-01-01 PROCEDURE — 80053 COMPREHEN METABOLIC PANEL: CPT | Performed by: EMERGENCY MEDICINE

## 2019-01-01 PROCEDURE — 93005 ELECTROCARDIOGRAM TRACING: CPT

## 2019-01-01 PROCEDURE — NC001 PR NO CHARGE: Performed by: FAMILY MEDICINE

## 2019-01-01 PROCEDURE — 95951 HB EEG MONITORING/VIDEORECORD: CPT

## 2019-01-01 PROCEDURE — 85049 AUTOMATED PLATELET COUNT: CPT | Performed by: PHYSICIAN ASSISTANT

## 2019-01-01 PROCEDURE — 82948 REAGENT STRIP/BLOOD GLUCOSE: CPT

## 2019-01-01 PROCEDURE — 87086 URINE CULTURE/COLONY COUNT: CPT | Performed by: STUDENT IN AN ORGANIZED HEALTH CARE EDUCATION/TRAINING PROGRAM

## 2019-01-01 PROCEDURE — 82805 BLOOD GASES W/O2 SATURATION: CPT | Performed by: NURSE PRACTITIONER

## 2019-01-01 PROCEDURE — 85025 COMPLETE CBC W/AUTO DIFF WBC: CPT | Performed by: EMERGENCY MEDICINE

## 2019-01-01 PROCEDURE — 99223 1ST HOSP IP/OBS HIGH 75: CPT | Performed by: PSYCHIATRY & NEUROLOGY

## 2019-01-01 PROCEDURE — 84157 ASSAY OF PROTEIN OTHER: CPT | Performed by: STUDENT IN AN ORGANIZED HEALTH CARE EDUCATION/TRAINING PROGRAM

## 2019-01-01 PROCEDURE — 95819 EEG AWAKE AND ASLEEP: CPT | Performed by: PSYCHIATRY & NEUROLOGY

## 2019-01-01 PROCEDURE — 99291 CRITICAL CARE FIRST HOUR: CPT | Performed by: NURSE PRACTITIONER

## 2019-01-01 PROCEDURE — G8979 MOBILITY GOAL STATUS: HCPCS

## 2019-01-01 PROCEDURE — 85025 COMPLETE CBC W/AUTO DIFF WBC: CPT | Performed by: NURSE PRACTITIONER

## 2019-01-01 PROCEDURE — 82977 ASSAY OF GGT: CPT | Performed by: STUDENT IN AN ORGANIZED HEALTH CARE EDUCATION/TRAINING PROGRAM

## 2019-01-01 PROCEDURE — 82105 ALPHA-FETOPROTEIN SERUM: CPT | Performed by: PSYCHIATRY & NEUROLOGY

## 2019-01-01 PROCEDURE — 84145 PROCALCITONIN (PCT): CPT | Performed by: INTERNAL MEDICINE

## 2019-01-01 PROCEDURE — 93010 ELECTROCARDIOGRAM REPORT: CPT | Performed by: INTERNAL MEDICINE

## 2019-01-01 PROCEDURE — 88108 CYTOPATH CONCENTRATE TECH: CPT | Performed by: PATHOLOGY

## 2019-01-01 PROCEDURE — 31500 INSERT EMERGENCY AIRWAY: CPT | Performed by: INTERNAL MEDICINE

## 2019-01-01 PROCEDURE — NC001 PR NO CHARGE: Performed by: ANESTHESIOLOGY

## 2019-01-01 PROCEDURE — 83735 ASSAY OF MAGNESIUM: CPT | Performed by: STUDENT IN AN ORGANIZED HEALTH CARE EDUCATION/TRAINING PROGRAM

## 2019-01-01 PROCEDURE — 93306 TTE W/DOPPLER COMPLETE: CPT | Performed by: INTERNAL MEDICINE

## 2019-01-01 PROCEDURE — 95819 EEG AWAKE AND ASLEEP: CPT

## 2019-01-01 PROCEDURE — G8978 MOBILITY CURRENT STATUS: HCPCS

## 2019-01-01 PROCEDURE — 85730 THROMBOPLASTIN TIME PARTIAL: CPT | Performed by: EMERGENCY MEDICINE

## 2019-01-01 PROCEDURE — 99238 HOSP IP/OBS DSCHRG MGMT 30/<: CPT | Performed by: ANESTHESIOLOGY

## 2019-01-01 PROCEDURE — 87389 HIV-1 AG W/HIV-1&-2 AB AG IA: CPT | Performed by: PSYCHIATRY & NEUROLOGY

## 2019-01-01 PROCEDURE — 85049 AUTOMATED PLATELET COUNT: CPT | Performed by: EMERGENCY MEDICINE

## 2019-01-01 PROCEDURE — 99292 CRITICAL CARE ADDL 30 MIN: CPT | Performed by: INTERNAL MEDICINE

## 2019-01-01 PROCEDURE — 70496 CT ANGIOGRAPHY HEAD: CPT

## 2019-01-01 PROCEDURE — 99283 EMERGENCY DEPT VISIT LOW MDM: CPT

## 2019-01-01 PROCEDURE — 83605 ASSAY OF LACTIC ACID: CPT | Performed by: EMERGENCY MEDICINE

## 2019-01-01 PROCEDURE — 99233 SBSQ HOSP IP/OBS HIGH 50: CPT | Performed by: NEUROLOGICAL SURGERY

## 2019-01-01 PROCEDURE — 70450 CT HEAD/BRAIN W/O DYE: CPT

## 2019-01-01 PROCEDURE — 83735 ASSAY OF MAGNESIUM: CPT | Performed by: NURSE PRACTITIONER

## 2019-01-01 PROCEDURE — 80053 COMPREHEN METABOLIC PANEL: CPT | Performed by: NURSE PRACTITIONER

## 2019-01-01 PROCEDURE — 36600 WITHDRAWAL OF ARTERIAL BLOOD: CPT

## 2019-01-01 PROCEDURE — 87081 CULTURE SCREEN ONLY: CPT | Performed by: STUDENT IN AN ORGANIZED HEALTH CARE EDUCATION/TRAINING PROGRAM

## 2019-01-01 PROCEDURE — 80048 BASIC METABOLIC PNL TOTAL CA: CPT | Performed by: INTERNAL MEDICINE

## 2019-01-01 PROCEDURE — 71260 CT THORAX DX C+: CPT

## 2019-01-01 PROCEDURE — 81001 URINALYSIS AUTO W/SCOPE: CPT | Performed by: EMERGENCY MEDICINE

## 2019-01-01 PROCEDURE — 82945 GLUCOSE OTHER FLUID: CPT | Performed by: STUDENT IN AN ORGANIZED HEALTH CARE EDUCATION/TRAINING PROGRAM

## 2019-01-01 PROCEDURE — 94003 VENT MGMT INPAT SUBQ DAY: CPT | Performed by: SOCIAL WORKER

## 2019-01-01 PROCEDURE — 99220 PR INITIAL OBSERVATION CARE/DAY 70 MINUTES: CPT | Performed by: FAMILY MEDICINE

## 2019-01-01 PROCEDURE — G8988 SELF CARE GOAL STATUS: HCPCS

## 2019-01-01 PROCEDURE — 0BH17EZ INSERTION OF ENDOTRACHEAL AIRWAY INTO TRACHEA, VIA NATURAL OR ARTIFICIAL OPENING: ICD-10-PCS | Performed by: INTERNAL MEDICINE

## 2019-01-01 PROCEDURE — 83690 ASSAY OF LIPASE: CPT | Performed by: EMERGENCY MEDICINE

## 2019-01-01 PROCEDURE — 85025 COMPLETE CBC W/AUTO DIFF WBC: CPT | Performed by: INTERNAL MEDICINE

## 2019-01-01 PROCEDURE — 93306 TTE W/DOPPLER COMPLETE: CPT

## 2019-01-01 PROCEDURE — 62270 DX LMBR SPI PNXR: CPT

## 2019-01-01 PROCEDURE — 84478 ASSAY OF TRIGLYCERIDES: CPT | Performed by: NURSE PRACTITIONER

## 2019-01-01 PROCEDURE — 80164 ASSAY DIPROPYLACETIC ACD TOT: CPT | Performed by: PSYCHIATRY & NEUROLOGY

## 2019-01-01 PROCEDURE — 81001 URINALYSIS AUTO W/SCOPE: CPT | Performed by: STUDENT IN AN ORGANIZED HEALTH CARE EDUCATION/TRAINING PROGRAM

## 2019-01-01 PROCEDURE — 82805 BLOOD GASES W/O2 SATURATION: CPT | Performed by: EMERGENCY MEDICINE

## 2019-01-01 PROCEDURE — 99213 OFFICE O/P EST LOW 20 MIN: CPT | Performed by: FAMILY MEDICINE

## 2019-01-01 PROCEDURE — 74177 CT ABD & PELVIS W/CONTRAST: CPT

## 2019-01-01 PROCEDURE — A9585 GADOBUTROL INJECTION: HCPCS | Performed by: PSYCHIATRY & NEUROLOGY

## 2019-01-01 PROCEDURE — 99285 EMERGENCY DEPT VISIT HI MDM: CPT

## 2019-01-01 PROCEDURE — 99232 SBSQ HOSP IP/OBS MODERATE 35: CPT | Performed by: PSYCHIATRY & NEUROLOGY

## 2019-01-01 PROCEDURE — 83735 ASSAY OF MAGNESIUM: CPT | Performed by: EMERGENCY MEDICINE

## 2019-01-01 PROCEDURE — 36600 WITHDRAWAL OF ARTERIAL BLOOD: CPT | Performed by: SOCIAL WORKER

## 2019-01-01 PROCEDURE — 83605 ASSAY OF LACTIC ACID: CPT | Performed by: INTERNAL MEDICINE

## 2019-01-01 PROCEDURE — 82378 CARCINOEMBRYONIC ANTIGEN: CPT | Performed by: PSYCHIATRY & NEUROLOGY

## 2019-01-01 PROCEDURE — 96361 HYDRATE IV INFUSION ADD-ON: CPT

## 2019-01-01 PROCEDURE — 99238 HOSP IP/OBS DSCHRG MGMT 30/<: CPT | Performed by: FAMILY MEDICINE

## 2019-01-01 PROCEDURE — 5A1945Z RESPIRATORY VENTILATION, 24-96 CONSECUTIVE HOURS: ICD-10-PCS | Performed by: INTERNAL MEDICINE

## 2019-01-01 PROCEDURE — G8987 SELF CARE CURRENT STATUS: HCPCS

## 2019-01-01 PROCEDURE — 76705 ECHO EXAM OF ABDOMEN: CPT

## 2019-01-01 PROCEDURE — 70498 CT ANGIOGRAPHY NECK: CPT

## 2019-01-01 PROCEDURE — 92610 EVALUATE SWALLOWING FUNCTION: CPT

## 2019-01-01 PROCEDURE — 85610 PROTHROMBIN TIME: CPT | Performed by: EMERGENCY MEDICINE

## 2019-01-01 PROCEDURE — 97163 PT EVAL HIGH COMPLEX 45 MIN: CPT

## 2019-01-01 PROCEDURE — 84100 ASSAY OF PHOSPHORUS: CPT | Performed by: NURSE PRACTITIONER

## 2019-01-01 PROCEDURE — 82164 ANGIOTENSIN I ENZYME TEST: CPT | Performed by: PHYSICIAN ASSISTANT

## 2019-01-01 PROCEDURE — 99223 1ST HOSP IP/OBS HIGH 75: CPT | Performed by: PHYSICIAN ASSISTANT

## 2019-01-01 PROCEDURE — NC001 PR NO CHARGE: Performed by: PHYSICIAN ASSISTANT

## 2019-01-01 PROCEDURE — 99291 CRITICAL CARE FIRST HOUR: CPT | Performed by: INTERNAL MEDICINE

## 2019-01-01 PROCEDURE — 99232 SBSQ HOSP IP/OBS MODERATE 35: CPT | Performed by: FAMILY MEDICINE

## 2019-01-01 PROCEDURE — 84443 ASSAY THYROID STIM HORMONE: CPT | Performed by: EMERGENCY MEDICINE

## 2019-01-01 PROCEDURE — 70553 MRI BRAIN STEM W/O & W/DYE: CPT

## 2019-01-01 PROCEDURE — 84484 ASSAY OF TROPONIN QUANT: CPT | Performed by: EMERGENCY MEDICINE

## 2019-01-01 PROCEDURE — 89051 BODY FLUID CELL COUNT: CPT | Performed by: STUDENT IN AN ORGANIZED HEALTH CARE EDUCATION/TRAINING PROGRAM

## 2019-01-01 PROCEDURE — 87040 BLOOD CULTURE FOR BACTERIA: CPT | Performed by: EMERGENCY MEDICINE

## 2019-01-01 PROCEDURE — 87449 NOS EACH ORGANISM AG IA: CPT | Performed by: PHYSICIAN ASSISTANT

## 2019-01-01 PROCEDURE — 80048 BASIC METABOLIC PNL TOTAL CA: CPT | Performed by: EMERGENCY MEDICINE

## 2019-01-01 PROCEDURE — 99222 1ST HOSP IP/OBS MODERATE 55: CPT | Performed by: PSYCHIATRY & NEUROLOGY

## 2019-01-01 PROCEDURE — 85027 COMPLETE CBC AUTOMATED: CPT | Performed by: NURSE PRACTITIONER

## 2019-01-01 PROCEDURE — 82248 BILIRUBIN DIRECT: CPT | Performed by: STUDENT IN AN ORGANIZED HEALTH CARE EDUCATION/TRAINING PROGRAM

## 2019-01-01 PROCEDURE — NC001 PR NO CHARGE: Performed by: EMERGENCY MEDICINE

## 2019-01-01 PROCEDURE — 99233 SBSQ HOSP IP/OBS HIGH 50: CPT | Performed by: FAMILY MEDICINE

## 2019-01-01 PROCEDURE — 85049 AUTOMATED PLATELET COUNT: CPT | Performed by: FAMILY MEDICINE

## 2019-01-01 PROCEDURE — 36556 INSERT NON-TUNNEL CV CATH: CPT | Performed by: PHYSICIAN ASSISTANT

## 2019-01-01 RX ORDER — POTASSIUM CHLORIDE 29.8 MG/ML
40 INJECTION INTRAVENOUS ONCE
Status: COMPLETED | OUTPATIENT
Start: 2019-01-01 | End: 2019-01-01

## 2019-01-01 RX ORDER — MIDAZOLAM HYDROCHLORIDE 2 MG/2ML
12 INJECTION, SOLUTION INTRAMUSCULAR; INTRAVENOUS ONCE
Status: COMPLETED | OUTPATIENT
Start: 2019-01-01 | End: 2019-01-01

## 2019-01-01 RX ORDER — NITROFURANTOIN 25; 75 MG/1; MG/1
100 CAPSULE ORAL 2 TIMES DAILY
Qty: 10 CAPSULE | Refills: 0 | Status: SHIPPED | OUTPATIENT
Start: 2019-01-01 | End: 2019-01-01 | Stop reason: HOSPADM

## 2019-01-01 RX ORDER — MIDAZOLAM HYDROCHLORIDE 2 MG/2ML
INJECTION, SOLUTION INTRAMUSCULAR; INTRAVENOUS
Status: COMPLETED
Start: 2019-01-01 | End: 2019-01-01

## 2019-01-01 RX ORDER — FENTANYL CITRATE 50 UG/ML
100 INJECTION, SOLUTION INTRAMUSCULAR; INTRAVENOUS ONCE
Status: COMPLETED | OUTPATIENT
Start: 2019-01-01 | End: 2019-01-01

## 2019-01-01 RX ORDER — KETAMINE HCL IN NACL, ISO-OSM 100MG/10ML
1 SYRINGE (ML) INJECTION ONCE
Status: COMPLETED | OUTPATIENT
Start: 2019-01-01 | End: 2019-01-01

## 2019-01-01 RX ORDER — ONDANSETRON 2 MG/ML
4 INJECTION INTRAMUSCULAR; INTRAVENOUS ONCE
Status: COMPLETED | OUTPATIENT
Start: 2019-01-01 | End: 2019-01-01

## 2019-01-01 RX ORDER — MIDAZOLAM HYDROCHLORIDE 2 MG/2ML
10 INJECTION, SOLUTION INTRAMUSCULAR; INTRAVENOUS ONCE
Status: COMPLETED | OUTPATIENT
Start: 2019-01-01 | End: 2019-01-01

## 2019-01-01 RX ORDER — NITROFURANTOIN 25; 75 MG/1; MG/1
100 CAPSULE ORAL 2 TIMES DAILY WITH MEALS
Status: DISCONTINUED | OUTPATIENT
Start: 2019-01-01 | End: 2019-01-01

## 2019-01-01 RX ORDER — MAGNESIUM SULFATE HEPTAHYDRATE 40 MG/ML
2 INJECTION, SOLUTION INTRAVENOUS ONCE
Status: COMPLETED | OUTPATIENT
Start: 2019-01-01 | End: 2019-01-01

## 2019-01-01 RX ORDER — ATORVASTATIN CALCIUM 40 MG/1
40 TABLET, FILM COATED ORAL
Status: DISCONTINUED | OUTPATIENT
Start: 2019-01-01 | End: 2019-01-01 | Stop reason: HOSPADM

## 2019-01-01 RX ORDER — SODIUM CHLORIDE 9 MG/ML
100 INJECTION, SOLUTION INTRAVENOUS CONTINUOUS
Status: DISCONTINUED | OUTPATIENT
Start: 2019-01-01 | End: 2019-01-01

## 2019-01-01 RX ORDER — METOPROLOL TARTRATE 5 MG/5ML
2.5 INJECTION INTRAVENOUS ONCE
Status: DISCONTINUED | OUTPATIENT
Start: 2019-01-01 | End: 2019-01-01

## 2019-01-01 RX ORDER — FUROSEMIDE 10 MG/ML
40 INJECTION INTRAMUSCULAR; INTRAVENOUS ONCE
Status: DISCONTINUED | OUTPATIENT
Start: 2019-01-01 | End: 2019-01-01

## 2019-01-01 RX ORDER — HYDRALAZINE HYDROCHLORIDE 20 MG/ML
5 INJECTION INTRAMUSCULAR; INTRAVENOUS ONCE
Status: COMPLETED | OUTPATIENT
Start: 2019-01-01 | End: 2019-01-01

## 2019-01-01 RX ORDER — MIDAZOLAM HYDROCHLORIDE 2 MG/2ML
10 INJECTION, SOLUTION INTRAMUSCULAR; INTRAVENOUS ONCE
Status: DISCONTINUED | OUTPATIENT
Start: 2019-01-01 | End: 2019-01-01 | Stop reason: HOSPADM

## 2019-01-01 RX ORDER — SODIUM CHLORIDE 9 MG/ML
60 INJECTION, SOLUTION INTRAVENOUS CONTINUOUS
Status: DISCONTINUED | OUTPATIENT
Start: 2019-01-01 | End: 2019-01-01

## 2019-01-01 RX ORDER — ASPIRIN 81 MG/1
81 TABLET, CHEWABLE ORAL DAILY
Status: DISCONTINUED | OUTPATIENT
Start: 2019-01-01 | End: 2019-01-01

## 2019-01-01 RX ORDER — PROPOFOL 10 MG/ML
5-50 INJECTION, EMULSION INTRAVENOUS
Status: DISCONTINUED | OUTPATIENT
Start: 2019-01-01 | End: 2019-01-01

## 2019-01-01 RX ORDER — MIDAZOLAM HYDROCHLORIDE 2 MG/2ML
10 INJECTION, SOLUTION INTRAMUSCULAR; INTRAVENOUS ONCE
Status: DISCONTINUED | OUTPATIENT
Start: 2019-01-01 | End: 2019-01-01 | Stop reason: SDUPTHER

## 2019-01-01 RX ORDER — CALCIUM CARBONATE 200(500)MG
1000 TABLET,CHEWABLE ORAL DAILY PRN
Status: DISCONTINUED | OUTPATIENT
Start: 2019-01-01 | End: 2019-01-01

## 2019-01-01 RX ORDER — SUCCINYLCHOLINE/SOD CL,ISO/PF 100 MG/5ML
1.5 SYRINGE (ML) INTRAVENOUS ONCE
Status: COMPLETED | OUTPATIENT
Start: 2019-01-01 | End: 2019-01-01

## 2019-01-01 RX ORDER — LISINOPRIL 20 MG/1
40 TABLET ORAL DAILY
Status: DISCONTINUED | OUTPATIENT
Start: 2019-01-01 | End: 2019-01-01 | Stop reason: HOSPADM

## 2019-01-01 RX ORDER — LORAZEPAM 2 MG/ML
0.5 INJECTION INTRAMUSCULAR
Status: DISCONTINUED | OUTPATIENT
Start: 2019-01-01 | End: 2019-01-01 | Stop reason: HOSPADM

## 2019-01-01 RX ORDER — HYDRALAZINE HYDROCHLORIDE 20 MG/ML
5 INJECTION INTRAMUSCULAR; INTRAVENOUS EVERY 4 HOURS PRN
Status: DISCONTINUED | OUTPATIENT
Start: 2019-01-01 | End: 2019-01-01 | Stop reason: HOSPADM

## 2019-01-01 RX ORDER — ATORVASTATIN CALCIUM 40 MG/1
40 TABLET, FILM COATED ORAL
Status: CANCELLED | OUTPATIENT
Start: 2019-01-01

## 2019-01-01 RX ORDER — NOREPINEPHRINE BITARTRATE 1 MG/ML
INJECTION, SOLUTION INTRAVENOUS
Status: COMPLETED
Start: 2019-01-01 | End: 2019-01-01

## 2019-01-01 RX ORDER — HYDRALAZINE HYDROCHLORIDE 20 MG/ML
5 INJECTION INTRAMUSCULAR; INTRAVENOUS EVERY 4 HOURS PRN
Status: CANCELLED | OUTPATIENT
Start: 2019-01-01

## 2019-01-01 RX ORDER — ATORVASTATIN CALCIUM 80 MG/1
TABLET, FILM COATED ORAL
Qty: 90 TABLET | Refills: 1 | OUTPATIENT
Start: 2019-01-01

## 2019-01-01 RX ORDER — ACETAMINOPHEN 325 MG/1
650 TABLET ORAL EVERY 6 HOURS PRN
Status: DISCONTINUED | OUTPATIENT
Start: 2019-01-01 | End: 2019-01-01

## 2019-01-01 RX ORDER — ATORVASTATIN CALCIUM 80 MG/1
80 TABLET, FILM COATED ORAL DAILY
Qty: 90 TABLET | Refills: 1 | Status: SHIPPED | OUTPATIENT
Start: 2019-01-01 | End: 2019-01-01 | Stop reason: SDUPTHER

## 2019-01-01 RX ORDER — CALCIUM CARBONATE 200(500)MG
1000 TABLET,CHEWABLE ORAL DAILY PRN
Status: CANCELLED | OUTPATIENT
Start: 2019-01-01

## 2019-01-01 RX ORDER — SENNOSIDES 8.8 MG/5ML
17.6 LIQUID ORAL 2 TIMES DAILY
Status: DISCONTINUED | OUTPATIENT
Start: 2019-01-01 | End: 2019-01-01

## 2019-01-01 RX ORDER — SENNOSIDES 8.6 MG
1 TABLET ORAL
Status: DISCONTINUED | OUTPATIENT
Start: 2019-01-01 | End: 2019-01-01 | Stop reason: HOSPADM

## 2019-01-01 RX ORDER — DEXTROSE MONOHYDRATE 25 G/50ML
25 INJECTION, SOLUTION INTRAVENOUS ONCE
Status: COMPLETED | OUTPATIENT
Start: 2019-01-01 | End: 2019-01-01

## 2019-01-01 RX ORDER — FENTANYL CITRATE 50 UG/ML
50 INJECTION, SOLUTION INTRAMUSCULAR; INTRAVENOUS
Status: DISCONTINUED | OUTPATIENT
Start: 2019-01-01 | End: 2019-01-01 | Stop reason: HOSPADM

## 2019-01-01 RX ORDER — LISINOPRIL 20 MG/1
20 TABLET ORAL DAILY
Status: DISCONTINUED | OUTPATIENT
Start: 2019-01-01 | End: 2019-01-01

## 2019-01-01 RX ORDER — ATORVASTATIN CALCIUM 40 MG/1
40 TABLET, FILM COATED ORAL
Status: DISCONTINUED | OUTPATIENT
Start: 2019-01-01 | End: 2019-01-01

## 2019-01-01 RX ORDER — FENTANYL CITRATE 50 UG/ML
INJECTION, SOLUTION INTRAMUSCULAR; INTRAVENOUS
Status: COMPLETED
Start: 2019-01-01 | End: 2019-01-01

## 2019-01-01 RX ORDER — SODIUM CHLORIDE, SODIUM GLUCONATE, SODIUM ACETATE, POTASSIUM CHLORIDE, MAGNESIUM CHLORIDE, SODIUM PHOSPHATE, DIBASIC, AND POTASSIUM PHOSPHATE .53; .5; .37; .037; .03; .012; .00082 G/100ML; G/100ML; G/100ML; G/100ML; G/100ML; G/100ML; G/100ML
75 INJECTION, SOLUTION INTRAVENOUS CONTINUOUS
Status: DISCONTINUED | OUTPATIENT
Start: 2019-01-01 | End: 2019-01-01

## 2019-01-01 RX ORDER — DOPAMINE HYDROCHLORIDE 160 MG/100ML
1-20 INJECTION, SOLUTION INTRAVENOUS
Status: DISCONTINUED | OUTPATIENT
Start: 2019-01-01 | End: 2019-01-01

## 2019-01-01 RX ORDER — SODIUM CHLORIDE, SODIUM GLUCONATE, SODIUM ACETATE, POTASSIUM CHLORIDE, MAGNESIUM CHLORIDE, SODIUM PHOSPHATE, DIBASIC, AND POTASSIUM PHOSPHATE .53; .5; .37; .037; .03; .012; .00082 G/100ML; G/100ML; G/100ML; G/100ML; G/100ML; G/100ML; G/100ML
100 INJECTION, SOLUTION INTRAVENOUS CONTINUOUS
Status: DISCONTINUED | OUTPATIENT
Start: 2019-01-01 | End: 2019-01-01

## 2019-01-01 RX ORDER — FENTANYL CITRATE 50 UG/ML
50 INJECTION, SOLUTION INTRAMUSCULAR; INTRAVENOUS
Status: DISCONTINUED | OUTPATIENT
Start: 2019-01-01 | End: 2019-01-01

## 2019-01-01 RX ORDER — SODIUM CHLORIDE, SODIUM GLUCONATE, SODIUM ACETATE, POTASSIUM CHLORIDE, MAGNESIUM CHLORIDE, SODIUM PHOSPHATE, DIBASIC, AND POTASSIUM PHOSPHATE .53; .5; .37; .037; .03; .012; .00082 G/100ML; G/100ML; G/100ML; G/100ML; G/100ML; G/100ML; G/100ML
60 INJECTION, SOLUTION INTRAVENOUS CONTINUOUS
Status: DISCONTINUED | OUTPATIENT
Start: 2019-01-01 | End: 2019-01-01

## 2019-01-01 RX ORDER — ATORVASTATIN CALCIUM 80 MG/1
80 TABLET, FILM COATED ORAL DAILY
Qty: 90 TABLET | Refills: 1 | Status: SHIPPED | OUTPATIENT
Start: 2019-01-01 | End: 2019-01-01 | Stop reason: HOSPADM

## 2019-01-01 RX ORDER — LORAZEPAM 2 MG/ML
2 INJECTION INTRAMUSCULAR ONCE
Status: COMPLETED | OUTPATIENT
Start: 2019-01-01 | End: 2019-01-01

## 2019-01-01 RX ORDER — FENTANYL CITRATE 50 UG/ML
100 INJECTION, SOLUTION INTRAMUSCULAR; INTRAVENOUS ONCE
Status: DISCONTINUED | OUTPATIENT
Start: 2019-01-01 | End: 2019-01-01 | Stop reason: HOSPADM

## 2019-01-01 RX ORDER — DOCUSATE SODIUM 100 MG/1
100 CAPSULE, LIQUID FILLED ORAL 2 TIMES DAILY
Status: DISCONTINUED | OUTPATIENT
Start: 2019-01-01 | End: 2019-01-01 | Stop reason: HOSPADM

## 2019-01-01 RX ORDER — NITROFURANTOIN 25; 75 MG/1; MG/1
100 CAPSULE ORAL ONCE
Status: COMPLETED | OUTPATIENT
Start: 2019-01-01 | End: 2019-01-01

## 2019-01-01 RX ORDER — ONDANSETRON 2 MG/ML
4 INJECTION INTRAMUSCULAR; INTRAVENOUS EVERY 6 HOURS PRN
Status: DISCONTINUED | OUTPATIENT
Start: 2019-01-01 | End: 2019-01-01 | Stop reason: HOSPADM

## 2019-01-01 RX ORDER — HYDRALAZINE HYDROCHLORIDE 20 MG/ML
5 INJECTION INTRAMUSCULAR; INTRAVENOUS ONCE
Status: DISCONTINUED | OUTPATIENT
Start: 2019-01-01 | End: 2019-01-01

## 2019-01-01 RX ORDER — PROPOFOL 10 MG/ML
80 INJECTION, EMULSION INTRAVENOUS
Status: DISCONTINUED | OUTPATIENT
Start: 2019-01-01 | End: 2019-01-01

## 2019-01-01 RX ORDER — HEPARIN SODIUM 5000 [USP'U]/ML
5000 INJECTION, SOLUTION INTRAVENOUS; SUBCUTANEOUS EVERY 8 HOURS SCHEDULED
Status: DISCONTINUED | OUTPATIENT
Start: 2019-01-01 | End: 2019-01-01

## 2019-01-01 RX ORDER — ATORVASTATIN CALCIUM 80 MG/1
80 TABLET, FILM COATED ORAL DAILY
Status: DISCONTINUED | OUTPATIENT
Start: 2019-01-01 | End: 2019-01-01

## 2019-01-01 RX ORDER — DOCUSATE SODIUM 100 MG/1
100 CAPSULE, LIQUID FILLED ORAL 2 TIMES DAILY
Status: CANCELLED | OUTPATIENT
Start: 2019-01-01

## 2019-01-01 RX ORDER — POTASSIUM CHLORIDE 14.9 MG/ML
20 INJECTION INTRAVENOUS
Status: DISCONTINUED | OUTPATIENT
Start: 2019-01-01 | End: 2019-01-01

## 2019-01-01 RX ORDER — LISINOPRIL 20 MG/1
40 TABLET ORAL DAILY
Status: DISCONTINUED | OUTPATIENT
Start: 2019-01-01 | End: 2019-01-01

## 2019-01-01 RX ORDER — HYDRALAZINE HYDROCHLORIDE 20 MG/ML
5 INJECTION INTRAMUSCULAR; INTRAVENOUS EVERY 4 HOURS PRN
Status: DISCONTINUED | OUTPATIENT
Start: 2019-01-01 | End: 2019-01-01

## 2019-01-01 RX ORDER — LACOSAMIDE 10 MG/ML
200 SOLUTION ORAL EVERY 12 HOURS SCHEDULED
Status: DISCONTINUED | OUTPATIENT
Start: 2019-01-01 | End: 2019-01-01

## 2019-01-01 RX ORDER — METOPROLOL TARTRATE 5 MG/5ML
INJECTION INTRAVENOUS
Status: COMPLETED
Start: 2019-01-01 | End: 2019-01-01

## 2019-01-01 RX ORDER — LISINOPRIL 20 MG/1
40 TABLET ORAL DAILY
Status: CANCELLED | OUTPATIENT
Start: 2019-01-01

## 2019-01-01 RX ORDER — ONDANSETRON 2 MG/ML
4 INJECTION INTRAMUSCULAR; INTRAVENOUS EVERY 6 HOURS PRN
Status: DISCONTINUED | OUTPATIENT
Start: 2019-01-01 | End: 2019-01-01

## 2019-01-01 RX ORDER — ALBUMIN, HUMAN INJ 5% 5 %
25 SOLUTION INTRAVENOUS ONCE
Status: COMPLETED | OUTPATIENT
Start: 2019-01-01 | End: 2019-01-01

## 2019-01-01 RX ORDER — ACETAMINOPHEN 160 MG/5ML
650 SUSPENSION, ORAL (FINAL DOSE FORM) ORAL EVERY 6 HOURS PRN
Status: DISCONTINUED | OUTPATIENT
Start: 2019-01-01 | End: 2019-01-01

## 2019-01-01 RX ORDER — GLYCOPYRROLATE 0.2 MG/ML
0.2 INJECTION INTRAMUSCULAR; INTRAVENOUS
Status: DISCONTINUED | OUTPATIENT
Start: 2019-01-01 | End: 2019-01-01 | Stop reason: HOSPADM

## 2019-01-01 RX ORDER — CHLORHEXIDINE GLUCONATE 0.12 MG/ML
15 RINSE ORAL EVERY 12 HOURS SCHEDULED
Status: DISCONTINUED | OUTPATIENT
Start: 2019-01-01 | End: 2019-01-01

## 2019-01-01 RX ORDER — IPRATROPIUM BROMIDE AND ALBUTEROL SULFATE 2.5; .5 MG/3ML; MG/3ML
3 SOLUTION RESPIRATORY (INHALATION) EVERY 6 HOURS PRN
Status: DISCONTINUED | OUTPATIENT
Start: 2019-01-01 | End: 2019-01-01 | Stop reason: HOSPADM

## 2019-01-01 RX ORDER — SENNOSIDES 8.6 MG
1 TABLET ORAL
Status: CANCELLED | OUTPATIENT
Start: 2019-01-01

## 2019-01-01 RX ORDER — VALPROIC ACID 250 MG/5ML
500 SOLUTION ORAL EVERY 8 HOURS SCHEDULED
Status: DISCONTINUED | OUTPATIENT
Start: 2019-01-01 | End: 2019-01-01

## 2019-01-01 RX ORDER — MAGNESIUM SULFATE HEPTAHYDRATE 40 MG/ML
INJECTION, SOLUTION INTRAVENOUS
Status: COMPLETED
Start: 2019-01-01 | End: 2019-01-01

## 2019-01-01 RX ORDER — ATORVASTATIN CALCIUM 80 MG/1
80 TABLET, FILM COATED ORAL
Status: DISCONTINUED | OUTPATIENT
Start: 2019-01-01 | End: 2019-01-01

## 2019-01-01 RX ORDER — LORAZEPAM 2 MG/ML
INJECTION INTRAMUSCULAR
Status: DISPENSED
Start: 2019-01-01 | End: 2019-01-01

## 2019-01-01 RX ORDER — DOCUSATE SODIUM 100 MG/1
100 CAPSULE, LIQUID FILLED ORAL 2 TIMES DAILY
Status: DISCONTINUED | OUTPATIENT
Start: 2019-01-01 | End: 2019-01-01

## 2019-01-01 RX ORDER — BISACODYL 10 MG
10 SUPPOSITORY, RECTAL RECTAL DAILY PRN
Status: DISCONTINUED | OUTPATIENT
Start: 2019-01-01 | End: 2019-01-01

## 2019-01-01 RX ORDER — SENNOSIDES 8.8 MG/5ML
17.6 LIQUID ORAL
Status: DISCONTINUED | OUTPATIENT
Start: 2019-01-01 | End: 2019-01-01

## 2019-01-01 RX ORDER — HYDRALAZINE HYDROCHLORIDE 20 MG/ML
10 INJECTION INTRAMUSCULAR; INTRAVENOUS ONCE
Status: DISCONTINUED | OUTPATIENT
Start: 2019-01-01 | End: 2019-01-01 | Stop reason: HOSPADM

## 2019-01-01 RX ORDER — LEVETIRACETAM 100 MG/ML
1750 SOLUTION ORAL EVERY 12 HOURS SCHEDULED
Status: DISCONTINUED | OUTPATIENT
Start: 2019-01-01 | End: 2019-01-01

## 2019-01-01 RX ORDER — IPRATROPIUM BROMIDE AND ALBUTEROL SULFATE 2.5; .5 MG/3ML; MG/3ML
3 SOLUTION RESPIRATORY (INHALATION) EVERY 6 HOURS PRN
Status: CANCELLED | OUTPATIENT
Start: 2019-01-01

## 2019-01-01 RX ORDER — LISINOPRIL 20 MG/1
20 TABLET ORAL DAILY
Qty: 90 TABLET | Refills: 3 | Status: SHIPPED | OUTPATIENT
Start: 2019-01-01 | End: 2019-01-01 | Stop reason: HOSPADM

## 2019-01-01 RX ORDER — ACETAMINOPHEN 325 MG/1
650 TABLET ORAL EVERY 6 HOURS PRN
Status: DISCONTINUED | OUTPATIENT
Start: 2019-01-01 | End: 2019-01-01 | Stop reason: HOSPADM

## 2019-01-01 RX ORDER — LIDOCAINE HYDROCHLORIDE 10 MG/ML
INJECTION, SOLUTION EPIDURAL; INFILTRATION; INTRACAUDAL; PERINEURAL
Status: DISCONTINUED
Start: 2019-01-01 | End: 2019-01-01 | Stop reason: WASHOUT

## 2019-01-01 RX ORDER — DEXTROSE AND SODIUM CHLORIDE 5; .9 G/100ML; G/100ML
75 INJECTION, SOLUTION INTRAVENOUS CONTINUOUS
Status: DISCONTINUED | OUTPATIENT
Start: 2019-01-01 | End: 2019-01-01

## 2019-01-01 RX ORDER — IPRATROPIUM BROMIDE AND ALBUTEROL SULFATE 2.5; .5 MG/3ML; MG/3ML
3 SOLUTION RESPIRATORY (INHALATION) EVERY 6 HOURS PRN
Status: DISCONTINUED | OUTPATIENT
Start: 2019-01-01 | End: 2019-01-01

## 2019-01-01 RX ORDER — SENNOSIDES 8.6 MG
1 TABLET ORAL
Status: DISCONTINUED | OUTPATIENT
Start: 2019-01-01 | End: 2019-01-01

## 2019-01-01 RX ORDER — POTASSIUM CHLORIDE 20MEQ/15ML
40 LIQUID (ML) ORAL ONCE
Status: COMPLETED | OUTPATIENT
Start: 2019-01-01 | End: 2019-01-01

## 2019-01-01 RX ORDER — CALCIUM CARBONATE 200(500)MG
1000 TABLET,CHEWABLE ORAL DAILY PRN
Status: DISCONTINUED | OUTPATIENT
Start: 2019-01-01 | End: 2019-01-01 | Stop reason: HOSPADM

## 2019-01-01 RX ORDER — PANTOPRAZOLE SODIUM 40 MG/1
40 INJECTION, POWDER, FOR SOLUTION INTRAVENOUS DAILY
Status: DISCONTINUED | OUTPATIENT
Start: 2019-01-01 | End: 2019-01-01

## 2019-01-01 RX ORDER — SODIUM CHLORIDE, SODIUM GLUCONATE, SODIUM ACETATE, POTASSIUM CHLORIDE, MAGNESIUM CHLORIDE, SODIUM PHOSPHATE, DIBASIC, AND POTASSIUM PHOSPHATE .53; .5; .37; .037; .03; .012; .00082 G/100ML; G/100ML; G/100ML; G/100ML; G/100ML; G/100ML; G/100ML
500 INJECTION, SOLUTION INTRAVENOUS ONCE
Status: COMPLETED | OUTPATIENT
Start: 2019-01-01 | End: 2019-01-01

## 2019-01-01 RX ORDER — ACETAMINOPHEN 325 MG/1
650 TABLET ORAL EVERY 6 HOURS PRN
Status: CANCELLED | OUTPATIENT
Start: 2019-01-01

## 2019-01-01 RX ORDER — ONDANSETRON 2 MG/ML
4 INJECTION INTRAMUSCULAR; INTRAVENOUS EVERY 6 HOURS PRN
Status: CANCELLED | OUTPATIENT
Start: 2019-01-01

## 2019-01-01 RX ORDER — ONDANSETRON 4 MG/1
4 TABLET, FILM COATED ORAL EVERY 6 HOURS
Qty: 12 TABLET | Refills: 0 | Status: SHIPPED | OUTPATIENT
Start: 2019-01-01 | End: 2019-01-01 | Stop reason: HOSPADM

## 2019-01-01 RX ADMIN — HEPARIN SODIUM 5000 UNITS: 5000 INJECTION INTRAVENOUS; SUBCUTANEOUS at 21:49

## 2019-01-01 RX ADMIN — CHLORHEXIDINE GLUCONATE 0.12% ORAL RINSE 15 ML: 1.2 LIQUID ORAL at 09:03

## 2019-01-01 RX ADMIN — SODIUM CHLORIDE 100 ML/HR: 0.9 INJECTION, SOLUTION INTRAVENOUS at 16:00

## 2019-01-01 RX ADMIN — PROPOFOL 80 MCG/KG/MIN: 10 INJECTION, EMULSION INTRAVENOUS at 12:44

## 2019-01-01 RX ADMIN — VALPROATE SODIUM 500 MG: 100 INJECTION, SOLUTION INTRAVENOUS at 21:09

## 2019-01-01 RX ADMIN — SODIUM CHLORIDE 200 MG: 9 INJECTION, SOLUTION INTRAVENOUS at 06:42

## 2019-01-01 RX ADMIN — INSULIN LISPRO 1 UNITS: 100 INJECTION, SOLUTION INTRAVENOUS; SUBCUTANEOUS at 05:37

## 2019-01-01 RX ADMIN — MIDAZOLAM 10 MG: 1 INJECTION INTRAMUSCULAR; INTRAVENOUS at 18:47

## 2019-01-01 RX ADMIN — LACOSAMIDE 200 MG: 10 SOLUTION ORAL at 18:31

## 2019-01-01 RX ADMIN — Medication 62 MG: at 22:00

## 2019-01-01 RX ADMIN — VALPROATE SODIUM 500 MG: 100 INJECTION, SOLUTION INTRAVENOUS at 08:09

## 2019-01-01 RX ADMIN — PROPOFOL 80 MCG/KG/MIN: 10 INJECTION, EMULSION INTRAVENOUS at 01:54

## 2019-01-01 RX ADMIN — KETAMINE HYDROCHLORIDE 4 MG/KG/HR: 50 INJECTION, SOLUTION INTRAMUSCULAR; INTRAVENOUS at 10:27

## 2019-01-01 RX ADMIN — MAGNESIUM SULFATE HEPTAHYDRATE 2 G: 40 INJECTION, SOLUTION INTRAVENOUS at 05:43

## 2019-01-01 RX ADMIN — NITROFURANTOIN (MONOHYDRATE/MACROCRYSTALS) 100 MG: 75; 25 CAPSULE ORAL at 00:47

## 2019-01-01 RX ADMIN — HEPARIN SODIUM 5000 UNITS: 5000 INJECTION INTRAVENOUS; SUBCUTANEOUS at 13:30

## 2019-01-01 RX ADMIN — VALPROATE SODIUM 300 MG: 100 INJECTION, SOLUTION INTRAVENOUS at 22:18

## 2019-01-01 RX ADMIN — HYDROCORTISONE: 25 OINTMENT TOPICAL at 18:38

## 2019-01-01 RX ADMIN — SODIUM CHLORIDE 100 ML/HR: 0.9 INJECTION, SOLUTION INTRAVENOUS at 12:43

## 2019-01-01 RX ADMIN — SODIUM CHLORIDE 100 ML/HR: 0.9 INJECTION, SOLUTION INTRAVENOUS at 05:24

## 2019-01-01 RX ADMIN — Medication 100 MG: at 17:35

## 2019-01-01 RX ADMIN — ATORVASTATIN CALCIUM 80 MG: 80 TABLET ORAL at 16:00

## 2019-01-01 RX ADMIN — SODIUM CHLORIDE 200 MG: 9 INJECTION, SOLUTION INTRAVENOUS at 18:03

## 2019-01-01 RX ADMIN — VALPROATE SODIUM 500 MG: 100 INJECTION, SOLUTION INTRAVENOUS at 17:04

## 2019-01-01 RX ADMIN — MIDAZOLAM 70 MG/HR: 5 INJECTION INTRAMUSCULAR; INTRAVENOUS at 07:34

## 2019-01-01 RX ADMIN — DOCUSATE SODIUM 100 MG: 100 CAPSULE, LIQUID FILLED ORAL at 17:32

## 2019-01-01 RX ADMIN — MIDAZOLAM 70 MG/HR: 5 INJECTION INTRAMUSCULAR; INTRAVENOUS at 03:19

## 2019-01-01 RX ADMIN — MIDAZOLAM 70 MG/HR: 5 INJECTION INTRAMUSCULAR; INTRAVENOUS at 09:03

## 2019-01-01 RX ADMIN — HYDRALAZINE HYDROCHLORIDE 5 MG: 20 INJECTION INTRAMUSCULAR; INTRAVENOUS at 04:27

## 2019-01-01 RX ADMIN — SENNOSIDES 17.6 MG: 8.8 SYRUP ORAL at 09:37

## 2019-01-01 RX ADMIN — METOPROLOL TARTRATE 5 MG: 5 INJECTION INTRAVENOUS at 05:30

## 2019-01-01 RX ADMIN — SODIUM CHLORIDE, SODIUM GLUCONATE, SODIUM ACETATE, POTASSIUM CHLORIDE AND MAGNESIUM CHLORIDE 500 ML: 526; 502; 368; 37; 30 INJECTION, SOLUTION INTRAVENOUS at 03:31

## 2019-01-01 RX ADMIN — NOREPINEPHRINE BITARTRATE 4000 MCG: 1 INJECTION INTRAVENOUS at 19:08

## 2019-01-01 RX ADMIN — INSULIN LISPRO 2 UNITS: 100 INJECTION, SOLUTION INTRAVENOUS; SUBCUTANEOUS at 12:33

## 2019-01-01 RX ADMIN — HYDROCORTISONE: 25 OINTMENT TOPICAL at 09:43

## 2019-01-01 RX ADMIN — MIDAZOLAM 70 MG/HR: 5 INJECTION INTRAMUSCULAR; INTRAVENOUS at 01:54

## 2019-01-01 RX ADMIN — MIDAZOLAM 70 MG/HR: 5 INJECTION INTRAMUSCULAR; INTRAVENOUS at 11:16

## 2019-01-01 RX ADMIN — CHLORHEXIDINE GLUCONATE 0.12% ORAL RINSE 15 ML: 1.2 LIQUID ORAL at 22:12

## 2019-01-01 RX ADMIN — MIDAZOLAM 70 MG/HR: 5 INJECTION INTRAMUSCULAR; INTRAVENOUS at 17:21

## 2019-01-01 RX ADMIN — DEXTROSE AND SODIUM CHLORIDE 500 ML: 5; .45 INJECTION, SOLUTION INTRAVENOUS at 00:11

## 2019-01-01 RX ADMIN — KETAMINE HYDROCHLORIDE 4 MG/KG/HR: 50 INJECTION, SOLUTION INTRAMUSCULAR; INTRAVENOUS at 02:33

## 2019-01-01 RX ADMIN — DEXTROSE 50 % IN WATER (D50W) INTRAVENOUS SYRINGE 25 ML: at 06:04

## 2019-01-01 RX ADMIN — MIDAZOLAM 40 MG/HR: 5 INJECTION INTRAMUSCULAR; INTRAVENOUS at 01:41

## 2019-01-01 RX ADMIN — SODIUM CHLORIDE 400 MG: 9 INJECTION, SOLUTION INTRAVENOUS at 06:15

## 2019-01-01 RX ADMIN — PROPOFOL 100 MCG/KG/MIN: 10 INJECTION, EMULSION INTRAVENOUS at 03:05

## 2019-01-01 RX ADMIN — KETAMINE HYDROCHLORIDE 4 MG/KG/HR: 50 INJECTION, SOLUTION INTRAMUSCULAR; INTRAVENOUS at 09:32

## 2019-01-01 RX ADMIN — PROPOFOL 80 MCG/KG/MIN: 10 INJECTION, EMULSION INTRAVENOUS at 23:18

## 2019-01-01 RX ADMIN — SENNOSIDES 17.6 MG: 8.8 SYRUP ORAL at 09:04

## 2019-01-01 RX ADMIN — DOPAMINE HYDROCHLORIDE IN DEXTROSE 9.5 MCG/KG/MIN: 1.6 INJECTION, SOLUTION INTRAVENOUS at 17:41

## 2019-01-01 RX ADMIN — PROPOFOL 60 MCG/KG/MIN: 10 INJECTION, EMULSION INTRAVENOUS at 10:18

## 2019-01-01 RX ADMIN — VALPROATE SODIUM 300 MG: 100 INJECTION, SOLUTION INTRAVENOUS at 21:39

## 2019-01-01 RX ADMIN — LORAZEPAM 2 MG: 2 INJECTION INTRAMUSCULAR; INTRAVENOUS at 10:15

## 2019-01-01 RX ADMIN — CHLORHEXIDINE GLUCONATE 0.12% ORAL RINSE 15 ML: 1.2 LIQUID ORAL at 08:22

## 2019-01-01 RX ADMIN — SENNOSIDES 17.6 MG: 8.8 SYRUP ORAL at 22:12

## 2019-01-01 RX ADMIN — MIDAZOLAM 12 MG: 1 INJECTION INTRAMUSCULAR; INTRAVENOUS at 17:25

## 2019-01-01 RX ADMIN — LEVETIRACETAM 1750 MG: 100 INJECTION, SOLUTION INTRAVENOUS at 23:14

## 2019-01-01 RX ADMIN — LEVETIRACETAM 1750 MG: 100 SOLUTION ORAL at 09:04

## 2019-01-01 RX ADMIN — SODIUM CHLORIDE 100 ML/HR: 0.9 INJECTION, SOLUTION INTRAVENOUS at 02:55

## 2019-01-01 RX ADMIN — VALPROATE SODIUM 500 MG: 100 INJECTION, SOLUTION INTRAVENOUS at 05:29

## 2019-01-01 RX ADMIN — SODIUM CHLORIDE, SODIUM GLUCONATE, SODIUM ACETATE, POTASSIUM CHLORIDE, MAGNESIUM CHLORIDE, SODIUM PHOSPHATE, DIBASIC, AND POTASSIUM PHOSPHATE 500 ML: .53; .5; .37; .037; .03; .012; .00082 INJECTION, SOLUTION INTRAVENOUS at 05:29

## 2019-01-01 RX ADMIN — HYDROCORTISONE: 25 OINTMENT TOPICAL at 08:41

## 2019-01-01 RX ADMIN — DOPAMINE HYDROCHLORIDE IN DEXTROSE 5 MCG/KG/MIN: 1.6 INJECTION, SOLUTION INTRAVENOUS at 01:12

## 2019-01-01 RX ADMIN — MIDAZOLAM 70 MG/HR: 5 INJECTION INTRAMUSCULAR; INTRAVENOUS at 12:45

## 2019-01-01 RX ADMIN — INSULIN LISPRO 2 UNITS: 100 INJECTION, SOLUTION INTRAVENOUS; SUBCUTANEOUS at 23:40

## 2019-01-01 RX ADMIN — HEPARIN SODIUM 5000 UNITS: 5000 INJECTION INTRAVENOUS; SUBCUTANEOUS at 05:51

## 2019-01-01 RX ADMIN — POTASSIUM CHLORIDE 40 MEQ: 29.8 INJECTION, SOLUTION INTRAVENOUS at 14:05

## 2019-01-01 RX ADMIN — LEVETIRACETAM 1750 MG: 100 INJECTION, SOLUTION INTRAVENOUS at 10:14

## 2019-01-01 RX ADMIN — PROPOFOL 80 MCG/KG/MIN: 10 INJECTION, EMULSION INTRAVENOUS at 05:16

## 2019-01-01 RX ADMIN — KETAMINE HYDROCHLORIDE 4 MG/KG/HR: 50 INJECTION, SOLUTION INTRAMUSCULAR; INTRAVENOUS at 01:13

## 2019-01-01 RX ADMIN — ALBUMIN (HUMAN) 25 G: 12.5 SOLUTION INTRAVENOUS at 00:17

## 2019-01-01 RX ADMIN — LACOSAMIDE 200 MG: 10 SOLUTION ORAL at 05:16

## 2019-01-01 RX ADMIN — INSULIN LISPRO 1 UNITS: 100 INJECTION, SOLUTION INTRAVENOUS; SUBCUTANEOUS at 06:48

## 2019-01-01 RX ADMIN — MIDAZOLAM 50 MG/HR: 5 INJECTION INTRAMUSCULAR; INTRAVENOUS at 08:40

## 2019-01-01 RX ADMIN — PROPOFOL 80 MCG/KG/MIN: 10 INJECTION, EMULSION INTRAVENOUS at 20:59

## 2019-01-01 RX ADMIN — SODIUM CHLORIDE 100 ML/HR: 0.9 INJECTION, SOLUTION INTRAVENOUS at 19:44

## 2019-01-01 RX ADMIN — DOCUSATE SODIUM 100 MG: 100 CAPSULE, LIQUID FILLED ORAL at 09:05

## 2019-01-01 RX ADMIN — INSULIN LISPRO 2 UNITS: 100 INJECTION, SOLUTION INTRAVENOUS; SUBCUTANEOUS at 05:48

## 2019-01-01 RX ADMIN — MIDAZOLAM 70 MG/HR: 5 INJECTION INTRAMUSCULAR; INTRAVENOUS at 22:36

## 2019-01-01 RX ADMIN — DOPAMINE HYDROCHLORIDE IN DEXTROSE 20 MCG/KG/MIN: 1.6 INJECTION, SOLUTION INTRAVENOUS at 05:42

## 2019-01-01 RX ADMIN — MIDAZOLAM 70 MG/HR: 5 INJECTION INTRAMUSCULAR; INTRAVENOUS at 12:04

## 2019-01-01 RX ADMIN — HYDROCORTISONE: 25 OINTMENT TOPICAL at 17:13

## 2019-01-01 RX ADMIN — PROPOFOL 80 MCG/KG/MIN: 10 INJECTION, EMULSION INTRAVENOUS at 09:09

## 2019-01-01 RX ADMIN — LISINOPRIL 20 MG: 20 TABLET ORAL at 16:00

## 2019-01-01 RX ADMIN — INSULIN LISPRO 1 UNITS: 100 INJECTION, SOLUTION INTRAVENOUS; SUBCUTANEOUS at 13:28

## 2019-01-01 RX ADMIN — DOCUSATE SODIUM 100 MG: 100 CAPSULE, LIQUID FILLED ORAL at 17:05

## 2019-01-01 RX ADMIN — SODIUM CHLORIDE 75 ML/HR: 234 INJECTION INTRAMUSCULAR; INTRAVENOUS; SUBCUTANEOUS at 02:33

## 2019-01-01 RX ADMIN — DOCUSATE SODIUM 100 MG: 100 CAPSULE, LIQUID FILLED ORAL at 17:13

## 2019-01-01 RX ADMIN — PROPOFOL 80 MCG/KG/MIN: 10 INJECTION, EMULSION INTRAVENOUS at 23:32

## 2019-01-01 RX ADMIN — DOPAMINE HYDROCHLORIDE IN DEXTROSE 14 MCG/KG/MIN: 1.6 INJECTION, SOLUTION INTRAVENOUS at 05:24

## 2019-01-01 RX ADMIN — HEPARIN SODIUM 5000 UNITS: 5000 INJECTION INTRAVENOUS; SUBCUTANEOUS at 05:36

## 2019-01-01 RX ADMIN — FENTANYL CITRATE 100 MCG: 50 INJECTION INTRAMUSCULAR; INTRAVENOUS at 17:32

## 2019-01-01 RX ADMIN — LEVETIRACETAM 1750 MG: 100 SOLUTION ORAL at 21:06

## 2019-01-01 RX ADMIN — HEPARIN SODIUM 5000 UNITS: 5000 INJECTION INTRAVENOUS; SUBCUTANEOUS at 13:20

## 2019-01-01 RX ADMIN — LEVETIRACETAM 1750 MG: 100 INJECTION, SOLUTION INTRAVENOUS at 08:55

## 2019-01-01 RX ADMIN — NOREPINEPHRINE BITARTRATE 18 MCG/MIN: 1 INJECTION INTRAVENOUS at 07:00

## 2019-01-01 RX ADMIN — MIDAZOLAM 70 MG/HR: 5 INJECTION INTRAMUSCULAR; INTRAVENOUS at 21:53

## 2019-01-01 RX ADMIN — KETAMINE HYDROCHLORIDE 4 MG/KG/HR: 50 INJECTION, SOLUTION INTRAMUSCULAR; INTRAVENOUS at 17:44

## 2019-01-01 RX ADMIN — INSULIN LISPRO 1 UNITS: 100 INJECTION, SOLUTION INTRAVENOUS; SUBCUTANEOUS at 17:06

## 2019-01-01 RX ADMIN — MIDAZOLAM 70 MG/HR: 5 INJECTION INTRAMUSCULAR; INTRAVENOUS at 19:29

## 2019-01-01 RX ADMIN — PHENOBARBITAL SODIUM 400 MG: 65 INJECTION INTRAMUSCULAR; INTRAVENOUS at 19:30

## 2019-01-01 RX ADMIN — PROPOFOL 80 MCG/KG/MIN: 10 INJECTION, EMULSION INTRAVENOUS at 06:51

## 2019-01-01 RX ADMIN — NOREPINEPHRINE BITARTRATE 9 MCG/MIN: 1 INJECTION INTRAVENOUS at 02:33

## 2019-01-01 RX ADMIN — ONDANSETRON 4 MG: 2 INJECTION INTRAMUSCULAR; INTRAVENOUS at 03:58

## 2019-01-01 RX ADMIN — MIDAZOLAM 10 MG: 1 INJECTION INTRAMUSCULAR; INTRAVENOUS at 03:15

## 2019-01-01 RX ADMIN — Medication 62 MG: at 20:39

## 2019-01-01 RX ADMIN — INSULIN LISPRO 1 UNITS: 100 INJECTION, SOLUTION INTRAVENOUS; SUBCUTANEOUS at 00:05

## 2019-01-01 RX ADMIN — ENOXAPARIN SODIUM 40 MG: 40 INJECTION SUBCUTANEOUS at 09:06

## 2019-01-01 RX ADMIN — MIDAZOLAM 12 MG: 1 INJECTION INTRAMUSCULAR; INTRAVENOUS at 17:30

## 2019-01-01 RX ADMIN — MIDAZOLAM 70 MG/HR: 5 INJECTION INTRAMUSCULAR; INTRAVENOUS at 18:44

## 2019-01-01 RX ADMIN — IOHEXOL 85 ML: 350 INJECTION, SOLUTION INTRAVENOUS at 13:59

## 2019-01-01 RX ADMIN — LEVETIRACETAM 1750 MG: 100 INJECTION, SOLUTION INTRAVENOUS at 20:35

## 2019-01-01 RX ADMIN — PHENOBARBITAL SODIUM 400 MG: 65 INJECTION INTRAMUSCULAR; INTRAVENOUS at 03:04

## 2019-01-01 RX ADMIN — ONDANSETRON 4 MG: 2 INJECTION INTRAMUSCULAR; INTRAVENOUS at 22:10

## 2019-01-01 RX ADMIN — MIDAZOLAM 10 MG: 1 INJECTION INTRAMUSCULAR; INTRAVENOUS at 23:23

## 2019-01-01 RX ADMIN — KETAMINE HYDROCHLORIDE 4 MG/KG/HR: 50 INJECTION, SOLUTION INTRAMUSCULAR; INTRAVENOUS at 05:53

## 2019-01-01 RX ADMIN — PHENOBARBITAL SODIUM 400 MG: 65 INJECTION INTRAMUSCULAR; INTRAVENOUS at 13:19

## 2019-01-01 RX ADMIN — DOPAMINE HYDROCHLORIDE IN DEXTROSE 20 MCG/KG/MIN: 1.6 INJECTION, SOLUTION INTRAVENOUS at 18:36

## 2019-01-01 RX ADMIN — HEPARIN SODIUM 5000 UNITS: 5000 INJECTION INTRAVENOUS; SUBCUTANEOUS at 21:06

## 2019-01-01 RX ADMIN — MIDAZOLAM 50 MG/HR: 5 INJECTION INTRAMUSCULAR; INTRAVENOUS at 04:08

## 2019-01-01 RX ADMIN — MIDAZOLAM 70 MG/HR: 5 INJECTION INTRAMUSCULAR; INTRAVENOUS at 04:24

## 2019-01-01 RX ADMIN — SODIUM CHLORIDE 200 MG: 9 INJECTION, SOLUTION INTRAVENOUS at 05:25

## 2019-01-01 RX ADMIN — HEPARIN SODIUM 5000 UNITS: 5000 INJECTION INTRAVENOUS; SUBCUTANEOUS at 17:00

## 2019-01-01 RX ADMIN — ALBUMIN (HUMAN) 25 G: 12.5 INJECTION, SOLUTION INTRAVENOUS at 03:21

## 2019-01-01 RX ADMIN — PROPOFOL 80 MCG/KG/MIN: 10 INJECTION, EMULSION INTRAVENOUS at 03:01

## 2019-01-01 RX ADMIN — MIDAZOLAM HYDROCHLORIDE 12 MG: 2 INJECTION, SOLUTION INTRAMUSCULAR; INTRAVENOUS at 17:25

## 2019-01-01 RX ADMIN — POTASSIUM CHLORIDE 40 MEQ: 20 SOLUTION ORAL at 07:41

## 2019-01-01 RX ADMIN — SODIUM CHLORIDE, SODIUM GLUCONATE, SODIUM ACETATE, POTASSIUM CHLORIDE AND MAGNESIUM CHLORIDE 60 ML/HR: 526; 502; 368; 37; 30 INJECTION, SOLUTION INTRAVENOUS at 17:04

## 2019-01-01 RX ADMIN — NOREPINEPHRINE BITARTRATE 4000 MCG: 1 INJECTION INTRAVENOUS at 01:43

## 2019-01-01 RX ADMIN — VALPROATE SODIUM 1232 MG: 100 INJECTION, SOLUTION INTRAVENOUS at 15:53

## 2019-01-01 RX ADMIN — SENNOSIDES 17.6 MG: 8.8 SYRUP ORAL at 21:13

## 2019-01-01 RX ADMIN — VALPROATE SODIUM 300 MG: 100 INJECTION, SOLUTION INTRAVENOUS at 05:09

## 2019-01-01 RX ADMIN — KETAMINE HYDROCHLORIDE 4 MG/KG/HR: 50 INJECTION, SOLUTION INTRAMUSCULAR; INTRAVENOUS at 08:11

## 2019-01-01 RX ADMIN — PROPOFOL 80 MCG/KG/MIN: 10 INJECTION, EMULSION INTRAVENOUS at 09:03

## 2019-01-01 RX ADMIN — ATORVASTATIN CALCIUM 40 MG: 40 TABLET, FILM COATED ORAL at 17:41

## 2019-01-01 RX ADMIN — METHYLPREDNISOLONE SODIUM SUCCINATE 1000 MG: 1 INJECTION, POWDER, LYOPHILIZED, FOR SOLUTION INTRAMUSCULAR; INTRAVENOUS at 20:22

## 2019-01-01 RX ADMIN — MIDAZOLAM 70 MG/HR: 5 INJECTION INTRAMUSCULAR; INTRAVENOUS at 05:56

## 2019-01-01 RX ADMIN — SODIUM CHLORIDE 1000 ML: 0.9 INJECTION, SOLUTION INTRAVENOUS at 22:10

## 2019-01-01 RX ADMIN — MIDAZOLAM 70 MG/HR: 5 INJECTION INTRAMUSCULAR; INTRAVENOUS at 03:01

## 2019-01-01 RX ADMIN — FENTANYL CITRATE 100 MCG: 50 INJECTION, SOLUTION INTRAMUSCULAR; INTRAVENOUS at 10:29

## 2019-01-01 RX ADMIN — KETAMINE HYDROCHLORIDE 4 MG/KG/HR: 50 INJECTION, SOLUTION INTRAMUSCULAR; INTRAVENOUS at 22:26

## 2019-01-01 RX ADMIN — PROPOFOL 100 MCG/KG/MIN: 10 INJECTION, EMULSION INTRAVENOUS at 00:10

## 2019-01-01 RX ADMIN — DEXTROSE AND SODIUM CHLORIDE 75 ML/HR: 5; .9 INJECTION, SOLUTION INTRAVENOUS at 01:35

## 2019-01-01 RX ADMIN — KETAMINE HYDROCHLORIDE 4 MG/KG/HR: 50 INJECTION, SOLUTION INTRAMUSCULAR; INTRAVENOUS at 12:47

## 2019-01-01 RX ADMIN — KETAMINE HYDROCHLORIDE 4 MG/KG/HR: 50 INJECTION, SOLUTION INTRAMUSCULAR; INTRAVENOUS at 07:00

## 2019-01-01 RX ADMIN — DEXTROSE AND SODIUM CHLORIDE 500 ML: 5; .45 INJECTION, SOLUTION INTRAVENOUS at 19:29

## 2019-01-01 RX ADMIN — PROPOFOL 60 MCG/KG/MIN: 10 INJECTION, EMULSION INTRAVENOUS at 14:07

## 2019-01-01 RX ADMIN — MIDAZOLAM 70 MG/HR: 5 INJECTION INTRAMUSCULAR; INTRAVENOUS at 13:58

## 2019-01-01 RX ADMIN — VALPROATE SODIUM 300 MG: 100 INJECTION, SOLUTION INTRAVENOUS at 14:06

## 2019-01-01 RX ADMIN — DEXTROSE 50 % IN WATER (D50W) INTRAVENOUS SYRINGE 25 ML: at 18:38

## 2019-01-01 RX ADMIN — METHYLPREDNISOLONE SODIUM SUCCINATE 1000 MG: 1 INJECTION, POWDER, LYOPHILIZED, FOR SOLUTION INTRAMUSCULAR; INTRAVENOUS at 10:22

## 2019-01-01 RX ADMIN — SODIUM CHLORIDE, SODIUM GLUCONATE, SODIUM ACETATE, POTASSIUM CHLORIDE, MAGNESIUM CHLORIDE, SODIUM PHOSPHATE, DIBASIC, AND POTASSIUM PHOSPHATE 75 ML/HR: .53; .5; .37; .037; .03; .012; .00082 INJECTION, SOLUTION INTRAVENOUS at 03:18

## 2019-01-01 RX ADMIN — MIDAZOLAM 70 MG/HR: 5 INJECTION INTRAMUSCULAR; INTRAVENOUS at 15:27

## 2019-01-01 RX ADMIN — HYDROCORTISONE: 25 OINTMENT TOPICAL at 17:07

## 2019-01-01 RX ADMIN — MIDAZOLAM 30 MG/HR: 5 INJECTION INTRAMUSCULAR; INTRAVENOUS at 21:02

## 2019-01-01 RX ADMIN — LEVETIRACETAM 1750 MG: 100 INJECTION, SOLUTION INTRAVENOUS at 08:23

## 2019-01-01 RX ADMIN — NOREPINEPHRINE BITARTRATE 10 MCG/MIN: 1 INJECTION INTRAVENOUS at 01:55

## 2019-01-01 RX ADMIN — SODIUM CHLORIDE 200 MG: 9 INJECTION, SOLUTION INTRAVENOUS at 18:22

## 2019-01-01 RX ADMIN — DEXTROSE 50 % IN WATER (D50W) INTRAVENOUS SYRINGE 25 ML: at 12:06

## 2019-01-01 RX ADMIN — KETAMINE HYDROCHLORIDE 4 MG/KG/HR: 50 INJECTION, SOLUTION INTRAMUSCULAR; INTRAVENOUS at 20:18

## 2019-01-01 RX ADMIN — KETAMINE HYDROCHLORIDE 4 MG/KG/HR: 50 INJECTION, SOLUTION INTRAMUSCULAR; INTRAVENOUS at 00:27

## 2019-01-01 RX ADMIN — KETAMINE HYDROCHLORIDE 4 MG/KG/HR: 50 INJECTION, SOLUTION INTRAMUSCULAR; INTRAVENOUS at 03:40

## 2019-01-01 RX ADMIN — LORAZEPAM 2 MG: 2 INJECTION INTRAMUSCULAR; INTRAVENOUS at 12:26

## 2019-01-01 RX ADMIN — MIDAZOLAM 70 MG/HR: 5 INJECTION INTRAMUSCULAR; INTRAVENOUS at 14:29

## 2019-01-01 RX ADMIN — VALPROATE SODIUM 300 MG: 100 INJECTION, SOLUTION INTRAVENOUS at 16:03

## 2019-01-01 RX ADMIN — SODIUM CHLORIDE 60 ML/HR: 0.9 INJECTION, SOLUTION INTRAVENOUS at 19:30

## 2019-01-01 RX ADMIN — INSULIN LISPRO 1 UNITS: 100 INJECTION, SOLUTION INTRAVENOUS; SUBCUTANEOUS at 17:48

## 2019-01-01 RX ADMIN — LEVETIRACETAM 1000 MG: 100 INJECTION, SOLUTION INTRAVENOUS at 14:57

## 2019-01-01 RX ADMIN — HYDROCORTISONE 1 APPLICATION: 25 OINTMENT TOPICAL at 09:03

## 2019-01-01 RX ADMIN — DOCUSATE SODIUM 100 MG: 100 CAPSULE, LIQUID FILLED ORAL at 09:43

## 2019-01-01 RX ADMIN — DOPAMINE HYDROCHLORIDE IN DEXTROSE 16 MCG/KG/MIN: 1.6 INJECTION, SOLUTION INTRAVENOUS at 12:42

## 2019-01-01 RX ADMIN — GADOBUTROL 6 ML: 604.72 INJECTION INTRAVENOUS at 14:30

## 2019-01-01 RX ADMIN — MIDAZOLAM 70 MG/HR: 5 INJECTION INTRAMUSCULAR; INTRAVENOUS at 23:18

## 2019-01-01 RX ADMIN — MIDAZOLAM 50 MG/HR: 5 INJECTION INTRAMUSCULAR; INTRAVENOUS at 06:24

## 2019-01-01 RX ADMIN — LISINOPRIL 20 MG: 20 TABLET ORAL at 09:43

## 2019-01-01 RX ADMIN — SODIUM CHLORIDE, SODIUM GLUCONATE, SODIUM ACETATE, POTASSIUM CHLORIDE AND MAGNESIUM CHLORIDE 100 ML/HR: 526; 502; 368; 37; 30 INJECTION, SOLUTION INTRAVENOUS at 17:50

## 2019-01-01 RX ADMIN — KETAMINE HYDROCHLORIDE 4 MG/KG/HR: 50 INJECTION, SOLUTION INTRAMUSCULAR; INTRAVENOUS at 04:48

## 2019-01-01 RX ADMIN — MIDAZOLAM 70 MG/HR: 5 INJECTION INTRAMUSCULAR; INTRAVENOUS at 16:07

## 2019-01-01 RX ADMIN — INSULIN LISPRO 1 UNITS: 100 INJECTION, SOLUTION INTRAVENOUS; SUBCUTANEOUS at 11:26

## 2019-01-01 RX ADMIN — HYDROCORTISONE: 25 OINTMENT TOPICAL at 17:33

## 2019-01-01 RX ADMIN — Medication 62 MG: at 18:21

## 2019-01-01 RX ADMIN — HEPARIN SODIUM 5000 UNITS: 5000 INJECTION INTRAVENOUS; SUBCUTANEOUS at 21:13

## 2019-01-01 RX ADMIN — HEPARIN SODIUM 5000 UNITS: 5000 INJECTION INTRAVENOUS; SUBCUTANEOUS at 13:06

## 2019-01-01 RX ADMIN — SODIUM CHLORIDE 200 MG: 9 INJECTION, SOLUTION INTRAVENOUS at 07:17

## 2019-01-01 RX ADMIN — FENTANYL CITRATE 100 MCG: 50 INJECTION, SOLUTION INTRAMUSCULAR; INTRAVENOUS at 17:32

## 2019-01-01 RX ADMIN — INSULIN LISPRO 2 UNITS: 100 INJECTION, SOLUTION INTRAVENOUS; SUBCUTANEOUS at 00:18

## 2019-01-01 RX ADMIN — SODIUM CHLORIDE 500 ML: 0.9 INJECTION, SOLUTION INTRAVENOUS at 16:59

## 2019-01-01 RX ADMIN — BISACODYL 10 MG: 10 SUPPOSITORY RECTAL at 12:42

## 2019-01-01 RX ADMIN — MIDAZOLAM 70 MG/HR: 5 INJECTION INTRAMUSCULAR; INTRAVENOUS at 17:44

## 2019-01-01 RX ADMIN — ENOXAPARIN SODIUM 40 MG: 40 INJECTION SUBCUTANEOUS at 16:00

## 2019-01-01 RX ADMIN — LEVETIRACETAM 1500 MG: 100 INJECTION, SOLUTION INTRAVENOUS at 03:47

## 2019-01-01 RX ADMIN — MIDAZOLAM 70 MG/HR: 5 INJECTION INTRAMUSCULAR; INTRAVENOUS at 04:48

## 2019-01-01 RX ADMIN — CHLORHEXIDINE GLUCONATE 0.12% ORAL RINSE 15 ML: 1.2 LIQUID ORAL at 21:06

## 2019-01-01 RX ADMIN — DOPAMINE HYDROCHLORIDE IN DEXTROSE 20 MCG/KG/MIN: 1.6 INJECTION, SOLUTION INTRAVENOUS at 00:17

## 2019-01-01 RX ADMIN — PROPOFOL 80 MCG/KG/MIN: 10 INJECTION, EMULSION INTRAVENOUS at 17:43

## 2019-01-01 RX ADMIN — LORAZEPAM 2 MG: 2 INJECTION INTRAMUSCULAR; INTRAVENOUS at 19:26

## 2019-01-01 RX ADMIN — INSULIN LISPRO 1 UNITS: 100 INJECTION, SOLUTION INTRAVENOUS; SUBCUTANEOUS at 18:25

## 2019-01-01 RX ADMIN — PROPOFOL 80 MCG/KG/MIN: 10 INJECTION, EMULSION INTRAVENOUS at 16:10

## 2019-01-01 RX ADMIN — HEPARIN SODIUM 5000 UNITS: 5000 INJECTION INTRAVENOUS; SUBCUTANEOUS at 05:35

## 2019-01-01 RX ADMIN — PROPOFOL 50 MCG/KG/MIN: 10 INJECTION, EMULSION INTRAVENOUS at 21:35

## 2019-01-01 RX ADMIN — ENOXAPARIN SODIUM 40 MG: 40 INJECTION SUBCUTANEOUS at 09:43

## 2019-01-01 RX ADMIN — PANTOPRAZOLE SODIUM 40 MG: 40 INJECTION, POWDER, FOR SOLUTION INTRAVENOUS at 10:18

## 2019-01-01 RX ADMIN — HEPARIN SODIUM 5000 UNITS: 5000 INJECTION INTRAVENOUS; SUBCUTANEOUS at 21:10

## 2019-01-01 RX ADMIN — LEVETIRACETAM 1750 MG: 100 INJECTION, SOLUTION INTRAVENOUS at 21:22

## 2019-01-01 RX ADMIN — VALPROATE SODIUM 300 MG: 100 INJECTION, SOLUTION INTRAVENOUS at 05:19

## 2019-01-01 RX ADMIN — IOHEXOL 100 ML: 350 INJECTION, SOLUTION INTRAVENOUS at 14:21

## 2019-01-01 RX ADMIN — ATORVASTATIN CALCIUM 40 MG: 40 TABLET, FILM COATED ORAL at 17:32

## 2019-01-01 RX ADMIN — SODIUM CHLORIDE 200 MG: 9 INJECTION, SOLUTION INTRAVENOUS at 18:38

## 2019-01-01 RX ADMIN — VALPROIC ACID 500 MG: 500 SOLUTION ORAL at 05:49

## 2019-01-01 RX ADMIN — HYDRALAZINE HYDROCHLORIDE 5 MG: 20 INJECTION INTRAMUSCULAR; INTRAVENOUS at 01:35

## 2019-01-01 RX ADMIN — MIDAZOLAM 70 MG/HR: 5 INJECTION INTRAMUSCULAR; INTRAVENOUS at 20:15

## 2019-01-01 RX ADMIN — HYDROCORTISONE: 25 OINTMENT TOPICAL at 17:42

## 2019-01-01 RX ADMIN — HYDROCORTISONE 1 APPLICATION: 25 OINTMENT TOPICAL at 08:23

## 2019-01-01 RX ADMIN — KETAMINE HYDROCHLORIDE 4 MG/KG/HR: 50 INJECTION, SOLUTION INTRAMUSCULAR; INTRAVENOUS at 22:52

## 2019-01-01 RX ADMIN — SODIUM CHLORIDE 200 MG: 9 INJECTION, SOLUTION INTRAVENOUS at 05:51

## 2019-01-01 RX ADMIN — DEXTROSE 50 % IN WATER (D50W) INTRAVENOUS SYRINGE 25 ML: at 00:43

## 2019-01-01 RX ADMIN — CHLORHEXIDINE GLUCONATE 0.12% ORAL RINSE 15 ML: 1.2 LIQUID ORAL at 20:50

## 2019-01-01 RX ADMIN — ASPIRIN 81 MG 81 MG: 81 TABLET ORAL at 09:42

## 2019-01-01 RX ADMIN — CHLORHEXIDINE GLUCONATE 0.12% ORAL RINSE 15 ML: 1.2 LIQUID ORAL at 08:40

## 2019-01-01 RX ADMIN — MIDAZOLAM 70 MG/HR: 5 INJECTION INTRAMUSCULAR; INTRAVENOUS at 07:00

## 2019-01-01 RX ADMIN — MIDAZOLAM 70 MG/HR: 5 INJECTION INTRAMUSCULAR; INTRAVENOUS at 01:02

## 2019-01-01 RX ADMIN — ATORVASTATIN CALCIUM 40 MG: 40 TABLET, FILM COATED ORAL at 17:05

## 2019-01-01 RX ADMIN — LISINOPRIL 40 MG: 20 TABLET ORAL at 09:05

## 2019-01-01 RX ADMIN — KETAMINE HYDROCHLORIDE 4 MG/KG/HR: 50 INJECTION, SOLUTION INTRAMUSCULAR; INTRAVENOUS at 15:08

## 2019-01-01 RX ADMIN — SODIUM CHLORIDE 500 ML: 0.9 INJECTION, SOLUTION INTRAVENOUS at 17:30

## 2019-01-01 RX ADMIN — HYDROCORTISONE: 25 OINTMENT TOPICAL at 09:06

## 2019-01-01 RX ADMIN — METHYLPREDNISOLONE SODIUM SUCCINATE 1000 MG: 1 INJECTION, POWDER, LYOPHILIZED, FOR SOLUTION INTRAMUSCULAR; INTRAVENOUS at 09:01

## 2019-01-01 RX ADMIN — KETAMINE HYDROCHLORIDE 2 MG/KG/HR: 50 INJECTION, SOLUTION INTRAMUSCULAR; INTRAVENOUS at 20:34

## 2019-01-01 RX ADMIN — HYDROCORTISONE: 25 OINTMENT TOPICAL at 08:48

## 2019-01-01 RX ADMIN — HEPARIN SODIUM 5000 UNITS: 5000 INJECTION INTRAVENOUS; SUBCUTANEOUS at 05:25

## 2019-01-01 RX ADMIN — ASPIRIN 81 MG 81 MG: 81 TABLET ORAL at 15:59

## 2019-01-01 RX ADMIN — SODIUM CHLORIDE 75 ML/HR: 234 INJECTION INTRAMUSCULAR; INTRAVENOUS; SUBCUTANEOUS at 12:39

## 2019-01-01 RX ADMIN — INSULIN LISPRO 1 UNITS: 100 INJECTION, SOLUTION INTRAVENOUS; SUBCUTANEOUS at 17:13

## 2019-01-01 RX ADMIN — HYDROCORTISONE 1 APPLICATION: 25 OINTMENT TOPICAL at 17:50

## 2019-01-01 RX ADMIN — LEVETIRACETAM 1000 MG: 100 INJECTION, SOLUTION INTRAVENOUS at 20:58

## 2019-01-01 RX ADMIN — MAGNESIUM SULFATE HEPTAHYDRATE 2 G: 40 INJECTION, SOLUTION INTRAVENOUS at 07:40

## 2019-01-01 RX ADMIN — MIDAZOLAM 10 MG/HR: 5 INJECTION INTRAMUSCULAR; INTRAVENOUS at 17:58

## 2019-01-01 RX ADMIN — MIDAZOLAM 70 MG/HR: 5 INJECTION INTRAMUSCULAR; INTRAVENOUS at 21:03

## 2019-01-01 RX ADMIN — POTASSIUM CHLORIDE 20 MEQ: 14.9 INJECTION, SOLUTION INTRAVENOUS at 08:40

## 2019-01-01 RX ADMIN — SENNOSIDES 17.6 MG: 8.8 SYRUP ORAL at 17:00

## 2019-01-01 RX ADMIN — MIDAZOLAM 70 MG/HR: 5 INJECTION INTRAMUSCULAR; INTRAVENOUS at 09:09

## 2019-01-01 RX ADMIN — HEPARIN SODIUM 5000 UNITS: 5000 INJECTION INTRAVENOUS; SUBCUTANEOUS at 05:16

## 2019-01-01 RX ADMIN — MIDAZOLAM 70 MG/HR: 5 INJECTION INTRAMUSCULAR; INTRAVENOUS at 10:18

## 2019-01-01 RX ADMIN — HYDRALAZINE HYDROCHLORIDE 5 MG: 20 INJECTION INTRAMUSCULAR; INTRAVENOUS at 17:34

## 2019-01-01 RX ADMIN — LEVETIRACETAM 1750 MG: 100 INJECTION, SOLUTION INTRAVENOUS at 21:09

## 2019-01-01 RX ADMIN — IOHEXOL 50 ML: 240 INJECTION, SOLUTION INTRATHECAL; INTRAVASCULAR; INTRAVENOUS; ORAL at 12:50

## 2019-01-01 RX ADMIN — VALPROATE SODIUM 300 MG: 100 INJECTION, SOLUTION INTRAVENOUS at 14:24

## 2019-01-01 RX ADMIN — MIDAZOLAM 70 MG/HR: 5 INJECTION INTRAMUSCULAR; INTRAVENOUS at 00:24

## 2019-01-01 RX ADMIN — LEVETIRACETAM 1750 MG: 100 INJECTION, SOLUTION INTRAVENOUS at 08:40

## 2019-01-01 RX ADMIN — SODIUM CHLORIDE 100 ML/HR: 0.9 INJECTION, SOLUTION INTRAVENOUS at 00:03

## 2019-01-01 RX ADMIN — PROPOFOL 80 MCG/KG/MIN: 10 INJECTION, EMULSION INTRAVENOUS at 06:24

## 2019-01-01 RX ADMIN — PROPOFOL 80 MCG/KG/MIN: 10 INJECTION, EMULSION INTRAVENOUS at 19:50

## 2019-01-01 RX ADMIN — ACETAMINOPHEN 650 MG: 325 TABLET, FILM COATED ORAL at 15:53

## 2019-01-01 RX ADMIN — VALPROIC ACID 500 MG: 500 SOLUTION ORAL at 13:35

## 2019-01-01 RX ADMIN — HEPARIN SODIUM 5000 UNITS: 5000 INJECTION INTRAVENOUS; SUBCUTANEOUS at 14:24

## 2019-01-01 RX ADMIN — MIDAZOLAM 10 MG: 1 INJECTION INTRAMUSCULAR; INTRAVENOUS at 00:45

## 2019-01-01 RX ADMIN — ACETAMINOPHEN 650 MG: 325 TABLET, FILM COATED ORAL at 07:34

## 2019-01-01 RX ADMIN — SODIUM CHLORIDE 200 MG: 9 INJECTION, SOLUTION INTRAVENOUS at 19:40

## 2019-01-01 RX ADMIN — HYDRALAZINE HYDROCHLORIDE 5 MG: 20 INJECTION INTRAMUSCULAR; INTRAVENOUS at 15:36

## 2019-01-01 RX ADMIN — SODIUM CHLORIDE 100 ML/HR: 0.9 INJECTION, SOLUTION INTRAVENOUS at 15:53

## 2019-01-01 RX ADMIN — VALPROIC ACID 500 MG: 500 SOLUTION ORAL at 21:07

## 2019-01-28 NOTE — ED PROVIDER NOTES
History  Chief Complaint   Patient presents with    Vomiting     Patient complains of vomiting that started several hours ago  Pt in ER with c/o vomiting that began after she ate dinner with her family  She denies abdominal pain or diarrhea  Pt's daughter is in ER and states that no one else has similar symptoms  Pt admits to urinary frequency during evaluation  Prior to Admission Medications   Prescriptions Last Dose Informant Patient Reported? Taking? albuterol (PROVENTIL HFA,VENTOLIN HFA) 90 mcg/act inhaler   No No   Sig: Inhale 1-2 puffs every 6 (six) hours as needed for wheezing   Patient not taking: Reported on 10/29/2018    aspirin 81 MG tablet   Yes No   Sig: Take 81 mg by mouth daily  atorvastatin (LIPITOR) 80 mg tablet   No No   Sig: Take 1 tablet (80 mg total) by mouth daily   ipratropium-albuterol (COMBIVENT RESPIMAT) inhaler   No No   Sig: Inhale 1 puff 2 (two) times a day   Patient not taking: Reported on 10/29/2018    lisinopril (ZESTRIL) 20 mg tablet   No No   Sig: Take 1 tablet (20 mg total) by mouth daily   metFORMIN (GLUCOPHAGE) 500 mg tablet   No No   Sig: Take 1 tablet (500 mg total) by mouth daily with breakfast      Facility-Administered Medications: None       Past Medical History:   Diagnosis Date    Bacterial pneumonia     Last Assessed: 11/14/2013    Breast cancer ([facility] )     hx    Diabetes mellitus ([facility] )     Hyperlipidemia     Hypertension        Past Surgical History:   Procedure Laterality Date    COMPLETE MASTECTOMY W/ SENTINEL NODE BIOPSY Left     Resolved: 6/19/2007       Family History   Problem Relation Age of Onset    Heart disease Mother     Heart disease Father      I have reviewed and agree with the history as documented      Social History   Substance Use Topics    Smoking status: Current Every Day Smoker     Packs/day: 1 00    Smokeless tobacco: Current User    Alcohol use No        Review of Systems   Constitutional: Negative for chills and fever    Respiratory: Negative for cough, chest tightness and shortness of breath  Gastrointestinal: Positive for nausea and vomiting  Negative for abdominal pain and diarrhea  Genitourinary: Negative for dysuria, frequency, hematuria and urgency  Musculoskeletal: Negative for back pain, neck pain and neck stiffness  All other systems reviewed and are negative  Physical Exam  Physical Exam   Constitutional: She is oriented to person, place, and time  She appears well-developed and well-nourished  No distress  HENT:   Head: Normocephalic and atraumatic  Eyes: Pupils are equal, round, and reactive to light  Conjunctivae are normal    Neck: Normal range of motion  Neck supple  Cardiovascular: Normal rate, regular rhythm and normal heart sounds  No murmur heard  Pulmonary/Chest: Effort normal and breath sounds normal  No respiratory distress  Abdominal: Soft  Bowel sounds are normal  She exhibits no distension  There is no tenderness  There is no rebound and no guarding  Musculoskeletal: Normal range of motion  She exhibits no edema or deformity  Neurological: She is alert and oriented to person, place, and time  No cranial nerve deficit  Skin: Skin is warm and dry  No rash noted  She is not diaphoretic  No pallor  Psychiatric: She has a normal mood and affect  Her behavior is normal    Nursing note and vitals reviewed        Vital Signs  ED Triage Vitals   Temperature Pulse Respirations Blood Pressure SpO2   01/27/19 2158 01/27/19 2158 01/27/19 2158 01/27/19 2158 01/27/19 2158   98 5 °F (36 9 °C) 76 20 161/62 91 %      Temp src Heart Rate Source Patient Position - Orthostatic VS BP Location FiO2 (%)   -- 01/27/19 2358 01/27/19 2358 01/27/19 2358 --    Monitor Lying Right arm       Pain Score       --                  Vitals:    01/27/19 2230 01/27/19 2300 01/27/19 2315 01/27/19 2358   BP:    140/64   Pulse: (!) 54 68 70 68   Patient Position - Orthostatic VS:    Lying       Visual Acuity      ED Medications  Medications   sodium chloride 0 9 % bolus 1,000 mL (0 mL Intravenous Stopped 1/28/19 0037)   ondansetron (ZOFRAN) injection 4 mg (4 mg Intravenous Given 1/27/19 2210)   nitrofurantoin (MACROBID) extended-release capsule 100 mg (100 mg Oral Given 1/28/19 0047)       Diagnostic Studies  Results Reviewed     Procedure Component Value Units Date/Time    Urine Microscopic [[id number]]  (Abnormal) Collected:  01/27/19 2331    Lab Status:  Final result Specimen:  Urine from Urine, Other Updated:  01/27/19 2352     RBC, UA None Seen /hpf      WBC, UA 30-50 (A) /hpf      Epithelial Cells Moderate (A) /hpf      Bacteria, UA Moderate (A) /hpf     UA w Reflex to Microscopic [[id number]]  (Abnormal) Collected:  01/27/19 2331    Lab Status:  Final result Specimen:  Urine from Urine, Other Updated:  01/27/19 2339     Color, UA Yellow     Clarity, UA Slightly Cloudy     Specific Kelly, UA 1 020     pH, UA 7 0     Leukocytes, UA Large (A)     Nitrite, UA Negative     Protein, UA Trace (A) mg/dl      Glucose, UA Negative mg/dl      Ketones, UA 40 (2+) (A) mg/dl      Urobilinogen, UA 1 0 E U /dl      Bilirubin, UA Negative     Blood, UA Trace-Intact (A)    Troponin I [[id number]]  (Normal) Collected:  01/27/19 2253    Lab Status:  Final result Specimen:  Blood from Arm, Right Updated:  01/27/19 2317     Troponin I 0 04 ng/mL     Comprehensive metabolic panel [[id number]]  (Abnormal) Collected:  01/27/19 2205    Lab Status:  Final result Specimen:  Blood from Arm, Right Updated:  01/27/19 2228     Sodium 134 (L) mmol/L      Potassium 4 1 mmol/L      Chloride 95 (L) mmol/L      CO2 26 mmol/L      ANION GAP 13 mmol/L      BUN 15 mg/dL      Creatinine 1 08 mg/dL      Glucose 178 (H) mg/dL      Calcium 9 9 mg/dL      AST 19 U/L      ALT 24 U/L      Alkaline Phosphatase 133 (H) U/L      Total Protein 8 3 (H) g/dL      Albumin 3 8 g/dL      Total Bilirubin 0 80 mg/dL      eGFR 52 ml/min/1 73sq m     Narrative: National Kidney Disease Education Program recommendations are as follows:  GFR calculation is accurate only with a steady state creatinine  Chronic Kidney disease less than 60 ml/min/1 73 sq  meters  Kidney failure less than 15 ml/min/1 73 sq  meters  Lipase [[id number]]  (Normal) Collected:  01/27/19 2205    Lab Status:  Final result Specimen:  Blood from Arm, Right Updated:  01/27/19 2228     Lipase 204 u/L     CBC and differential [[id number]]  (Abnormal) Collected:  01/27/19 2205    Lab Status:  Final result Specimen:  Blood from Arm, Right Updated:  01/27/19 2225     WBC 13 26 (H) Thousand/uL      RBC 4 84 Million/uL      Hemoglobin 14 8 g/dL      Hematocrit 46 6 (H) %      MCV 96 fL      MCH 30 6 pg      MCHC 31 8 g/dL      RDW 14 1 %      MPV 10 6 fL      Platelets 884 Thousands/uL      nRBC 0 /100 WBCs      Neutrophils Relative 70 %      Immat GRANS % 2 %      Lymphocytes Relative 18 %      Monocytes Relative 9 %      Eosinophils Relative 1 %      Basophils Relative 0 %      Neutrophils Absolute 9 41 (H) Thousands/µL      Immature Grans Absolute 0 22 (H) Thousand/uL      Lymphocytes Absolute 2 32 Thousands/µL      Monocytes Absolute 1 20 Thousand/µL      Eosinophils Absolute 0 06 Thousand/µL      Basophils Absolute 0 05 Thousands/µL                  No orders to display              Procedures  Procedures       Phone Contacts  ED Phone Contact    ED Course                               MDM  Number of Diagnoses or Management Options  UTI (urinary tract infection):   Vomiting:   Diagnosis management comments: Pt tolerating PO in ER  Will d/c to home with macrobid for UTI   She agrees with plan and will return for further concerns       Amount and/or Complexity of Data Reviewed  Clinical lab tests: ordered and reviewed    Risk of Complications, Morbidity, and/or Mortality  Presenting problems: moderate  Diagnostic procedures: moderate  Management options: moderate    Patient Progress  Patient progress: improved    CritCare Time    Disposition  Final diagnoses:   UTI (urinary tract infection)   Vomiting     Time reflects when diagnosis was documented in both MDM as applicable and the Disposition within this note     Time User Action Codes Description Comment    1/28/2019 12:24 AM Heike MORENO Add [N39 0] UTI (urinary tract infection)     1/28/2019 12:24 AM Heike MORENO Add [R11 10] Vomiting       ED Disposition     ED Disposition Condition Comment    Discharge  David Kenny discharge to home/self care  Condition at discharge: Stable        Follow-up Information     Follow up With Specialties Details Why [address]  Schedule an appointment as soon as possible for a visit in 1 day for follow up [id number] [facility]          Discharge Medication List as of 1/28/2019 12:26 AM      START taking these medications    Details   nitrofurantoin (MACROBID) 100 mg capsule Take 1 capsule (100 mg total) by mouth 2 (two) times a day, Starting Mon 1/28/2019, Normal      ondansetron (ZOFRAN) 4 mg tablet Take 1 tablet (4 mg total) by mouth every 6 (six) hours, Starting Mon 1/28/2019, Normal         CONTINUE these medications which have NOT CHANGED    Details   albuterol (PROVENTIL HFA,VENTOLIN HFA) 90 mcg/act inhaler Inhale 1-2 puffs every 6 (six) hours as needed for wheezing, Starting Mon 9/10/2018, Normal      aspirin 81 MG tablet Take 81 mg by mouth daily  , Until Discontinued, Historical Med      atorvastatin (LIPITOR) 80 mg tablet Take 1 tablet (80 mg total) by mouth daily, Starting Thu 1/10/2019, Normal      ipratropium-albuterol (COMBIVENT RESPIMAT) inhaler Inhale 1 puff 2 (two) times a day, Starting Mon 9/10/2018, Normal      lisinopril (ZESTRIL) 20 mg tablet Take 1 tablet (20 mg total) by mouth daily, Starting Mon 9/10/2018, Normal      metFORMIN (GLUCOPHAGE) 500 mg tablet Take 1 tablet (500 mg total) by mouth daily with breakfast, Starting Thu 1/10/2019, Normal           No discharge procedures on file      ED Provider  Electronically Signed by           Sarah Zhang DO  01/28/19 4200

## 2019-01-31 PROBLEM — I10 ESSENTIAL HYPERTENSION: Status: ACTIVE | Noted: 2019-01-01

## 2019-01-31 PROBLEM — E11.9 CONTROLLED TYPE 2 DIABETES MELLITUS WITHOUT COMPLICATION, WITHOUT LONG-TERM CURRENT USE OF INSULIN (HCC): Status: ACTIVE | Noted: 2019-01-01

## 2019-01-31 NOTE — PROGRESS NOTES
Assessment/Plan:     Diagnoses and all orders for this visit  Type II DM, well controlled: Currently stable and well managed on Metformin 500mg qd  Continue current management  Discussed diet/exercise  Pt to return in 3 months for follow up and repeat HgbA1c  Subjective:      Patient ID: Jorge Porter is a 72 y o  female  HPI  Jason Haddad is a 73 yo F with PMH of well controlled type II DM, hyperlipidemia, hypertension and COPD who presents today for follow up of diabetes  Type II DM: Pt is currently on Metformin 500mg qd  Last HgbA1c on 9/11/18 was very slightly elevated at 6 4 which is improved from prior A1c  Pt's son keeps a very detailed log of her daily AM and PM pre and post prandial sugars and they have been within range  Foot exam up to date  Eye exam not done yet  Pt has been working on improving her diet, but does occasionally eat sweets  Does not exercise  The following portions of the patient's history were reviewed and updated as appropriate: allergies, current medications, past family history, past medical history, past social history, past surgical history and problem list     Review of Systems   Constitutional: Negative for activity change, appetite change, chills, diaphoresis, fatigue and fever  HENT: Negative  Respiratory: Negative for cough, choking, chest tightness, shortness of breath and wheezing  Cardiovascular: Negative for chest pain, palpitations and leg swelling  Gastrointestinal: Negative for abdominal distention, abdominal pain, constipation, diarrhea, nausea and vomiting  Genitourinary: Negative for difficulty urinating, dyspareunia, dysuria, enuresis, flank pain, frequency, menstrual problem, pelvic pain and urgency  Musculoskeletal: Negative for arthralgias, back pain, gait problem, joint swelling, myalgias, neck pain and neck stiffness  Skin: Negative      Neurological: Negative for dizziness, tremors, syncope, facial asymmetry, weakness, light-headedness, numbness and headaches  Psychiatric/Behavioral: Negative  Objective:      /78 (BP Location: Right arm, Patient Position: Sitting, Cuff Size: Large)   Pulse 75   Temp 99 3 °F (37 4 °C) (Tympanic)   Wt 63 5 kg (139 lb 14 4 oz)   LMP 05/29/1998   SpO2 94%   BMI 28 26 kg/m²          Physical Exam   Constitutional: She is oriented to person, place, and time  She appears well-developed and well-nourished  No distress  HENT:   Head: Normocephalic and atraumatic  Cardiovascular: Normal rate, regular rhythm, normal heart sounds and intact distal pulses  Exam reveals no gallop and no friction rub  No murmur heard  Pulmonary/Chest: Effort normal and breath sounds normal  No respiratory distress  She has no wheezes  She has no rales  She exhibits no tenderness  Neurological: She is alert and oriented to person, place, and time  Skin: She is not diaphoretic

## 2019-11-11 PROBLEM — R17 TOTAL BILIRUBIN, ELEVATED: Status: ACTIVE | Noted: 2019-01-01

## 2019-11-11 PROBLEM — E11.9 CONTROLLED TYPE 2 DIABETES MELLITUS WITHOUT COMPLICATION, WITHOUT LONG-TERM CURRENT USE OF INSULIN (HCC): Chronic | Status: ACTIVE | Noted: 2019-01-01

## 2019-11-11 PROBLEM — I16.0 HYPERTENSIVE URGENCY: Status: ACTIVE | Noted: 2019-01-01

## 2019-11-11 PROBLEM — R41.0 DISORIENTATION: Status: ACTIVE | Noted: 2019-01-01

## 2019-11-11 PROBLEM — I10 ESSENTIAL HYPERTENSION: Chronic | Status: ACTIVE | Noted: 2019-01-01

## 2019-11-11 NOTE — H&P
H&P Exam - Carroll Kenny 69 y o  female MRN: [id number]    Unit/Bed#: [address] [place] [address] Encounter: [id number]      Assessment/Plan     Assessment & Plan:  Patient is a 79-year-old female chronic smoker with hypertension and diabetes presenting with altered mental status without a clear cause; questionably from metabolic encephalopathy vs UTI  Patient is admitted under professional care of Dr Sanjuana Garcia, with an anticipated duration of stay of less than 1 midnight  * Disorientation  Assessment & Plan  Patient was found confused in the morning  - No hypoglycemia or hyperglycemia with anion gap metabolic acidosis  - CT head & CXR were unremarkable   - 100 ml/hr IV NS infusion for fluid hydration, as patient's CBC may indicate hemoconcentration from dehydration  - repeat UA with UCx to r/o UTI, as she has a history of urinary incontinence, and the initial UA showed innumerable mucus threads; patient is now also running a low fever  No leukocytosis or lactic acidosis (1 8)  - Blood Cx pending   - Procalcitonin pending  - PT/OT   - Consider outpatient neuro eval for dementia once d/c   - Patient was mildly febrile after transfer to the medical floor  Will start her on broad spectrum antibiotics if patient spikes a fever with an elevated procal      Hypertensive urgency  Assessment & Plan  Upon arrival, patient's blood pressure was elevated at critical range of 206/84  Currently the blood pressure has gone down to 164/73  Patient is asymptomatic, denying headaches, blurry vision, or abdominal pain  Patient reports she takes her medications as instructed  - Will continue to monitor  - Continue patient's daily home BP med lisinopriol 20 mg PO daily  Total bilirubin, elevated  Assessment & Plan  Patient has an elevated total bilirubin of 1 1 on admission accompanied with elevated Alk Phos of 126   May indicate obstructive biliary pathology that may be affecting her mentation   - Direct Bili  - GGT  - RUQ U/S Essential hypertension  Assessment & Plan  See Hypertensive Urgency     Controlled type 2 diabetes mellitus without complication, without long-term current use of insulin (HCC)  Assessment & Plan  Lab Results   Component Value Date    HGBA1C 6 4 (H) 09/11/2018       No results for input(s): POCGLU in the last 72 hours  Blood Sugar Average: Last 72 hrs:  Patient's current blood sugar is 145  - HbA1C has not been checked for 14 months  HbA1C ordered  - continue home meds 500 mg daily   - ISS 1-5 U, titrate as necessary    F - 100 ml/hr IVNS   E - monitor and correct as necessary  N - Diabetic Diet   D - SQL & SCD    History of Present Illness     HPI:  Adolfo Khoury is a 69 y o  female chronic smoker with history of diabetes , hypertension , and left breast cancer who presents with altered mental status  According to the patient's son, the patient usually wakes at 6:00 a m  Nonda Ehrich clockwork " However, this morning the patient was still in bed, while the son woke up before her  Later in the morning, when the son was out shopping, her wife called him saying that the patient was acting strangely  When patient arrived back home, the son found the patient tangled up in her shirt, with her arms through the neck hole  Both patient and son admit to the patient's increased forgetfulness, but otherwise patient is usually lucid  Patient's blood pressure was critically elevated at the ER  When patient was informed about this, patient states, my blood pressure goes up and down    And it is usually high when I am with the doctor    Patient states that she takes her medications regularly  When patient was asked about the reason why she is here, the patient endorsed weakness in both legs and shortness of breath  Patient elaborates that she runs out of breath when she walks up the flight of stairs at her son's house, where she lives    Patient denies any angina, numbness or tingling, nausea or vomiting, headache, or blurry vision  PCP: Erika Fermin MD    Review of Systems   Constitutional: Positive for fever  Gastrointestinal: Positive for abdominal pain (relieved with voiding ) and constipation  All other systems reviewed and are negative  Historical Information   Past Medical History:   Diagnosis Date    Bacterial pneumonia     Last Assessed: 11/14/2013    Breast cancer (HCC)     hx    Diabetes mellitus ([facility] [address] )     Hyperlipidemia     Hypertension      Past Surgical History:   Procedure Laterality Date    COMPLETE MASTECTOMY W/ SENTINEL NODE BIOPSY Left     Resolved: 6/19/2007     Social History   Social History     Substance and Sexual Activity   Alcohol Use Not Currently     Social History     Substance and Sexual Activity   Drug Use Not Currently     Social History     Tobacco Use   Smoking Status Current Every Day Smoker    Packs/day: 1 00   Smokeless Tobacco Current User     Family History:   Family History   Problem Relation Age of Onset    Heart disease Mother     Heart disease Father        Meds/Allergies   PTA meds:   Prior to Admission Medications   Prescriptions Last Dose Informant Patient Reported? Taking? albuterol (PROVENTIL HFA,VENTOLIN HFA) 90 mcg/act inhaler Not Taking at Unknown time  No No   Sig: Inhale 1-2 puffs every 6 (six) hours as needed for wheezing   Patient not taking: Reported on 6/6/2019   aspirin 81 MG tablet 11/10/2019 at Unknown time  Yes Yes   Sig: Take 81 mg by mouth daily     atorvastatin (LIPITOR) 80 mg tablet 11/10/2019 at Unknown time  No Yes   Sig: Take 1 tablet (80 mg total) by mouth daily   hydrocortisone 2 5 % ointment Unknown at Unknown time  No No   Sig: Apply topically 2 (two) times a day   ipratropium-albuterol (COMBIVENT RESPIMAT) inhaler Not Taking at Unknown time  No No   Sig: Inhale 1 puff 2 (two) times a day   Patient not taking: Reported on 10/29/2018    lisinopril (ZESTRIL) 20 mg tablet Unknown at Unknown time  No No   Sig: Take 1 tablet (20 mg total) by mouth daily   metFORMIN (GLUCOPHAGE) 500 mg tablet 11/10/2019 at Unknown time  No Yes   Sig: Take 1 tablet (500 mg total) by mouth daily with breakfast   nitrofurantoin (MACROBID) 100 mg capsule Not Taking at Unknown time  No No   Sig: Take 1 capsule (100 mg total) by mouth 2 (two) times a day   Patient not taking: Reported on 11/11/2019   ondansetron (ZOFRAN) 4 mg tablet Unknown at Unknown time  No No   Sig: Take 1 tablet (4 mg total) by mouth every 6 (six) hours      Facility-Administered Medications: None     Allergies   Allergen Reactions    Percocet [Oxycodone-Acetaminophen]        Objective   Vitals: Blood pressure 164/73, pulse 56, temperature 100 1 °F (37 8 °C), temperature source Tympanic, resp  rate 18, height 4' 11" (1 499 m), weight 61 6 kg (135 lb 12 9 oz), last menstrual period 05/29/1998, SpO2 97 %, not currently breastfeeding  Intake/Output Summary (Last 24 hours) at 11/11/2019 1628  Last data filed at 11/11/2019 1056  Gross per 24 hour   Intake    Output 50 ml   Net -50 ml       Invasive Devices     Peripheral Intravenous Line            Peripheral IV 11/11/19 Right Antecubital less than 1 day                Physical Exam   Constitutional: She is oriented to person, place, and time  No distress  Cardiovascular: Regular rhythm and intact distal pulses  Bradycardia present  Pulmonary/Chest: Effort normal and breath sounds normal  No respiratory distress  She has no wheezes  She has no rales  Abdominal: Soft  She exhibits no distension  There is no tenderness  There is no guarding  Musculoskeletal: She exhibits no edema  Neurological: She is alert and oriented to person, place, and time  No sensory deficit (Romberg negative  sensation to light touch in LE intact b/l)  She exhibits normal muscle tone (5/5 in LE bilaterally )  Coordination (slow but steady gait without instability) normal    Skin: Skin is warm  She is not diaphoretic  No pallor     Psychiatric: She has a normal mood and affect  Her behavior is normal    Vitals reviewed  Lab Results: I have personally reviewed pertinent reports  Results for orders placed or performed during the hospital encounter of 11/11/19   Blood culture #1   Result Value Ref Range    Blood Culture Received in Microbiology Lab  Culture in Progress  Blood culture #2   Result Value Ref Range    Blood Culture Received in Microbiology Lab  Culture in Progress      CBC and differential   Result Value Ref Range    WBC 10 28 (H) 4 31 - 10 16 Thousand/uL    RBC 5 29 (H) 3 81 - 5 12 Million/uL    Hemoglobin 16 4 (H) 11 5 - 15 4 g/dL    Hematocrit 51 3 (H) 34 8 - 46 1 %    MCV 97 82 - 98 fL    MCH 31 0 26 8 - 34 3 pg    MCHC 32 0 31 4 - 37 4 g/dL    RDW 15 5 (H) 11 6 - 15 1 %    MPV 11 2 8 9 - 12 7 fL    Platelets 911 655 - 274 Thousands/uL    nRBC 0 /100 WBCs    Neutrophils Relative 82 (H) 43 - 75 %    Immat GRANS % 0 0 - 2 %    Lymphocytes Relative 14 14 - 44 %    Monocytes Relative 4 4 - 12 %    Eosinophils Relative 0 0 - 6 %    Basophils Relative 0 0 - 1 %    Neutrophils Absolute 8 40 (H) 1 85 - 7 62 Thousands/µL    Immature Grans Absolute 0 04 0 00 - 0 20 Thousand/uL    Lymphocytes Absolute 1 43 0 60 - 4 47 Thousands/µL    Monocytes Absolute 0 37 0 17 - 1 22 Thousand/µL    Eosinophils Absolute 0 01 0 00 - 0 61 Thousand/µL    Basophils Absolute 0 03 0 00 - 0 10 Thousands/µL   Protime-INR   Result Value Ref Range    Protime 10 3 9 8 - 12 0 seconds    INR 0 96 0 91 - 1 09   APTT   Result Value Ref Range    PTT 24 (L) 25 - 32 seconds   Comprehensive metabolic panel   Result Value Ref Range    Sodium 137 136 - 145 mmol/L    Potassium 5 6 (H) 3 5 - 5 3 mmol/L    Chloride 101 100 - 108 mmol/L    CO2 27 21 - 32 mmol/L    ANION GAP 9 4 - 13 mmol/L    BUN 17 5 - 25 mg/dL    Creatinine 0 97 0 60 - 1 30 mg/dL    Glucose 145 (H) 65 - 140 mg/dL    Calcium 9 2 8 3 - 10 1 mg/dL    AST 44 5 - 45 U/L    ALT 32 12 - 78 U/L    Alkaline Phosphatase 126 (H) 46 - 116 U/L    Total Protein 8 1 6 4 - 8 2 g/dL    Albumin 4 1 3 5 - 5 0 g/dL    Total Bilirubin 1 10 (H) 0 20 - 1 00 mg/dL    eGFR 59 ml/min/1 73sq m   Lactic acid, plasma x2   Result Value Ref Range    LACTIC ACID 1 8 0 5 - 2 0 mmol/L   UA w Reflex to Microscopic   Result Value Ref Range    Color, UA Yellow     Clarity, UA Clear     Specific Gravity, UA >=1 030 1 000 - 1 030    pH, UA 6 0 5 0, 5 5, 6 0, 6 5, 7 0, 7 5, 8 0, 8 5, 9 0    Leukocytes, UA Negative Negative    Nitrite, UA Negative Negative    Protein, UA 30 (1+) (A) Negative mg/dl    Glucose, UA Negative Negative mg/dl    Ketones, UA Negative Negative mg/dl    Urobilinogen, UA 1 0 0 2, 1 0 E U /dl E U /dl    Bilirubin, UA Negative Negative    Blood, UA Small (A) Negative   Urine Microscopic   Result Value Ref Range    RBC, UA 0-1 (A) None Seen, 0-5 /hpf    WBC, UA 1-2 (A) None Seen, 0-5, 5-55, 5-65 /hpf    Epithelial Cells Occasional None Seen, Occasional /hpf    Bacteria, UA None Seen None Seen, Occasional /hpf    Fine granular casts 0-1 /lpf    AMORPH URATES Occasional /hpf    MUCUS THREADS Innumerable (A) None Seen   Green / Black tube on hold   Result Value Ref Range    Extra Tube Hold for add-ons  Imaging: I have personally reviewed pertinent films in PACS   CT head without contrast   Final Result      No acute intracranial abnormality  Microangiopathic changes  Workstation performed: [id number]         XR chest 1 view portable   Final Result      No acute cardiopulmonary disease  Workstation performed: [id number]         US right upper quadrant    (Results Pending)       EKG, Pathology, and Other Studies: I have personally reviewed pertinent reports  EKG: sinus bradycardia, bifascicular block  Code Status: Level 1 - Full Code    Counseling / Coordination of Care  Total floor / unit time spent today 25 minutes    Greater than 50% of total time was spent with the patient and / or family counseling and / or coordination of care

## 2019-11-11 NOTE — ASSESSMENT & PLAN NOTE
Patient was found confused in the morning, more sleepy than normal  - No hypoglycemia or hyperglycemia with anion gap metabolic acidosis  - CT head & CXR were unremarkable   - 100 ml/hr IV NS infusion for fluid hydration, as patient's CBC may indicate hemoconcentration from dehydration  - repeat UA with UCx to r/o UTI, as she has a history of urinary incontinence, and the initial UA showed innumerable mucus threads; patient is now also running a low fever  No leukocytosis or lactic acidosis (1 8)  - Blood Cx pending   - Procalcitonin - normal   - PT/OT - both recommend outpatient therapies  - Pt more agitated and disoriented on day 2  Patient also endorsed persistent oscillating/double vision that started 3-4 days ago  Neuro Dr Lucina Sanabria consulted  MRI & CTA Brain revealed findings suggestive of metastases or lymphoma to the R temperal-occipital region     · Lumbar puncture needed  · CT Chest / Abd / Pelvis - negative for masses appearing as malignancies  · Heme/onc consulted - see note  · EEG & AEDs held:  Eeg positive for recurrent seizures/spikes - per Neurology needs to be transported to an epilepsy monitoring unit

## 2019-11-11 NOTE — ASSESSMENT & PLAN NOTE
Lab Results   Component Value Date    HGBA1C 6 5 (H) 11/11/2019       Recent Labs     11/12/19  2106 11/13/19  0703 11/13/19  1124 11/13/19  1642   POCGLU 110 90 169* 80       Blood Sugar Average: Last 72 hrs:  (P) 310 [id number]   A1c well controlled at 6 5  - On Diabetic Diet   - ISS 1-5 U, titrate as needed  - home metformin being held due to testing

## 2019-11-11 NOTE — ED NOTES
Patient transported to [facility], [address] [place] [address]  11/11/19 3423

## 2019-11-11 NOTE — ASSESSMENT & PLAN NOTE
Patient has an elevated total bilirubin of 1 1 on admission accompanied with elevated Alk Phos of 126  May indicate obstructive biliary pathology that may be affecting her mentation   - Direct Bili and GGT normal; most like elevated total bilirubin from hemolysis     - RUQ U/S - cholelithiasis without cholecystitis   - assess as outpatient after other testing has been evaluated

## 2019-11-11 NOTE — ED PROVIDER NOTES
History  Chief Complaint   Patient presents with    Weakness - Generalized    Diarrhea    Headache     sx started this am     Patient was brought in by her son because she appears more disoriented to him today  He states she was well yesterday during the day however he did not see her at night as she retired early last night  Today she slepted in late which was unusual for her  She seems more lethargic and less interactive  She states she has a smoker's cough, but did not notice a fever  She feels weaker than normal   She has had some bouts of diarrhea recently  Patient denies any pain on arrival   She is a smoker          Prior to Admission Medications   Prescriptions Last Dose Informant Patient Reported? Taking? albuterol (PROVENTIL HFA,VENTOLIN HFA) 90 mcg/act inhaler Not Taking at Unknown time  No No   Sig: Inhale 1-2 puffs every 6 (six) hours as needed for wheezing   Patient not taking: Reported on 6/6/2019   aspirin 81 MG tablet 11/10/2019 at Unknown time  Yes Yes   Sig: Take 81 mg by mouth daily     atorvastatin (LIPITOR) 80 mg tablet 11/10/2019 at Unknown time  No Yes   Sig: Take 1 tablet (80 mg total) by mouth daily   hydrocortisone 2 5 % ointment   No No   Sig: Apply topically 2 (two) times a day   ipratropium-albuterol (COMBIVENT RESPIMAT) inhaler Not Taking at Unknown time  No No   Sig: Inhale 1 puff 2 (two) times a day   Patient not taking: Reported on 10/29/2018    lisinopril (ZESTRIL) 20 mg tablet   No No   Sig: Take 1 tablet (20 mg total) by mouth daily   metFORMIN (GLUCOPHAGE) 500 mg tablet 11/10/2019 at Unknown time  No Yes   Sig: Take 1 tablet (500 mg total) by mouth daily with breakfast   nitrofurantoin (MACROBID) 100 mg capsule Not Taking at Unknown time  No No   Sig: Take 1 capsule (100 mg total) by mouth 2 (two) times a day   Patient not taking: Reported on 11/11/2019   ondansetron (ZOFRAN) 4 mg tablet   No No   Sig: Take 1 tablet (4 mg total) by mouth every 6 (six) hours Facility-Administered Medications: None       Past Medical History:   Diagnosis Date    Bacterial pneumonia     Last Assessed: 11/14/2013    Breast cancer (HCC)     hx    Diabetes mellitus ([facility] [address]     Hyperlipidemia     Hypertension        Past Surgical History:   Procedure Laterality Date    COMPLETE MASTECTOMY W/ SENTINEL NODE BIOPSY Left     Resolved: 6/19/2007       Family History   Problem Relation Age of Onset    Heart disease Mother     Heart disease Father      I have reviewed and agree with the history as documented  Social History     Tobacco Use    Smoking status: Current Every Day Smoker     Packs/day: 1 00    Smokeless tobacco: Current User   Substance Use Topics    Alcohol use: No    Drug use: No        Review of Systems   Constitutional: Negative for chills and fever  HENT: Negative for congestion, sore throat and trouble swallowing  Eyes: Negative for visual disturbance  Respiratory: Positive for cough  Negative for shortness of breath  Cardiovascular: Negative for chest pain, palpitations and leg swelling  Gastrointestinal: Negative for abdominal pain  Genitourinary: Negative for dysuria and frequency  Musculoskeletal: Negative for back pain and neck pain  Skin: Negative for rash  Neurological: Positive for weakness and headaches  Hematological: Does not bruise/bleed easily  Psychiatric/Behavioral: Positive for confusion  All other systems reviewed and are negative  Physical Exam  Physical Exam   Constitutional: She appears well-developed and well-nourished  HENT:   Head: Normocephalic and atraumatic  Mouth/Throat: Oropharynx is clear and moist    Eyes: Conjunctivae are normal    Neck: Normal range of motion  Neck supple  Cardiovascular: Normal rate, regular rhythm and normal heart sounds  Pulmonary/Chest: Effort normal  She has rales  The patient has rhonchi and rales on the left side  Right side is clear   Abdominal: Soft   Bowel sounds are normal  There is no tenderness  Musculoskeletal: Normal range of motion  She exhibits edema  Neurological: No cranial nerve deficit  Coordination normal    Patient arrives awake but somewhat lethargic  She knows she is in the hospital    Skin: Skin is warm and dry  Capillary refill takes less than 2 seconds  Psychiatric: Her behavior is normal    Nursing note and vitals reviewed  Vital Signs  ED Triage Vitals [11/11/19 0934]   Temperature Pulse Respirations Blood Pressure SpO2   99 2 °F (37 3 °C) 72 (!) 24 (!) 206/84 93 %      Temp Source Heart Rate Source Patient Position - Orthostatic VS BP Location FiO2 (%)   Tympanic Monitor Sitting Right arm --      Pain Score       4           Vitals:    11/11/19 0934   BP: (!) 206/84   Pulse: 72   Patient Position - Orthostatic VS: Sitting         Visual Acuity      ED Medications  Medications - No data to display    Diagnostic Studies  Results Reviewed     Procedure Component Value Units Date/Time    Blood culture #1 [[id number]] Collected:  11/11/19 0950    Lab Status: In process Specimen:  Blood from Arm, Right Updated:  11/11/19 0956    Comprehensive metabolic panel [[id number]] Collected:  11/11/19 0950    Lab Status: In process Specimen:  Blood from Arm, Right Updated:  11/11/19 [place] [[id number]] Collected:  11/11/19 0950    Lab Status: In process Specimen:  Blood from Arm, Right Updated:  11/11/19 0955    APTT [[id number]] Collected:  11/11/19 0950    Lab Status: In process Specimen:  Blood from Arm, Right Updated:  11/11/19 0955    CBC and differential [[id number]] Collected:  11/11/19 0950    Lab Status: In process Specimen:  Blood from Arm, Right Updated:  11/11/19 0955    Lactic acid, plasma x2 [[id number]] Collected:  11/11/19 0950    Lab Status:   In process Specimen:  Blood from Arm, Right Updated:  11/11/19 0955    Blood culture #2 [[id number]]     Lab Status:  No result Specimen:  Blood     Lactic acid, plasma x2 [[id number]]     Lab Status:  No result Specimen:  Blood     UA w Reflex to Microscopic [[id number]]     Lab Status:  No result Specimen:  Urine                  XR chest 1 view portable    (Results Pending)              Procedures  ECG 12 Lead Documentation Only  Date/Time: 11/11/2019 9:51 AM  Performed by: Yung Hurd MD  Authorized by: Yung Hurd MD     Indications / Diagnosis:  AMS  ECG reviewed by me, the ED Provider: yes    Patient location:  ED  Interpretation:     Interpretation: abnormal    Rate:     ECG rate:  59    ECG rate assessment: bradycardic    Rhythm:     Rhythm: sinus bradycardia    Ectopy:     Ectopy: none    QRS:     QRS axis:  Right    QRS intervals:  Normal  Conduction:     Conduction: abnormal      Abnormal conduction: bifascicular block    ST segments:     ST segments:  Normal  T waves:     T waves: normal             ED Course                               MDM  Number of Diagnoses or Management Options  Diagnosis management comments: Patient's son states the same thing has happened to her before in association with the urinary tract infection  Although she has not complained of any urinary symptoms he suspects that is the case  Will check a septic workup  I suspect pneumonia      Disposition  Final diagnoses:   None     ED Disposition     None      Follow-up Information    None         Patient's Medications   Discharge Prescriptions    No medications on file     No discharge procedures on file      ED Provider  Electronically Signed by           Yung Hurd MD  11/11/19 2666

## 2019-11-12 PROBLEM — C79.31 BRAIN METASTASES (HCC): Status: ACTIVE | Noted: 2019-01-01

## 2019-11-12 PROBLEM — R41.842 VISUAL-SPATIAL IMPAIRMENT: Status: ACTIVE | Noted: 2019-01-01

## 2019-11-12 NOTE — PROGRESS NOTES
[facility] 26 Progress Note - Caryn Taylor 69 y o  female MRN: [id number]    Unit/Bed#: [address] [place] [address] Encounter: [id number]      Assessment/Plan:  * Disorientation  Assessment & Plan  Patient was found confused in the morning  - No hypoglycemia or hyperglycemia with anion gap metabolic acidosis  - CT head & CXR were unremarkable   - 100 ml/hr IV NS infusion for fluid hydration, as patient's CBC may indicate hemoconcentration from dehydration  - repeat UA with UCx to r/o UTI, as she has a history of urinary incontinence, and the initial UA showed innumerable mucus threads; patient is now also running a low fever  No leukocytosis or lactic acidosis (1 8)  - Blood Cx pending   - Procalcitonin pending, came back normal the following day   - PT/OT   - Patient more agitated and disoriented on day 2  Patient also endorsed persistent oscillating/double vision that started 3-4 days ago  Neuro Dr Hortensia King consulted  MRI & CTA Brain revealed findings suggestive of metastases or lymphoma to the R temperal-occipital region  · IR will perform LP  · CT Chest / Abd / Pelvis ordered  500 ml IV NS bolus administered   · heme/onc consulted - recs appreciated  · EEG ordered, AEDs held     Hypertensive urgency  Assessment & Plan  Upon arrival, patient's blood pressure was elevated at critical range of 206/84  Currently the blood pressure has gone down to 164/73  Patient is asymptomatic, denying headaches, blurry vision, or abdominal pain  Patient reports she takes her medications as instructed  - Will continue to monitor  - Continue patient's daily home BP med lisinopriol 20 mg PO daily    - BP more controlled at 147-164 SBP   - As per Dr Hortensia King, keep patient normotensive  Will consider adding additional antihypertensives if necessary     Total bilirubin, elevated  Assessment & Plan  Patient has an elevated total bilirubin of 1 1 on admission accompanied with elevated Alk Phos of 126   May indicate obstructive biliary pathology that may be affecting her mentation   - Direct Bili and GGT normal; most like elevated total bilirubin from hemolysis  - RUQ U/S - cholelithiasis without cholecystitis     Essential hypertension  Assessment & Plan  See Hypertensive Urgency     Controlled type 2 diabetes mellitus without complication, without long-term current use of insulin [facility])  Assessment & Plan  Lab Results   Component Value Date    HGBA1C 6 5 (H) 11/11/2019       Recent Labs     11/12/19  0736 11/12/19  1116 11/12/19  1626 11/12/19  2106   POCGLU 103 94 172* 110       Blood Sugar Average: Last 72 hrs:  (P) 015 [id number]   - HbA1C has not been checked for 14 months  HbA1C ordered  - continue home meds Metformin 500 mg daily   - On Diabetic Diet   - ISS 1-5 U, titrate as necessary    F - 500 mL IV bolus before CT w/ contrast; 100 mL/hr IV NS   E - monitor and correct as necessary  N - Diabetic Diet  D - SQL + SCD    Subjective:   Pt was seen and examined at bedside  No acute events overnight  Patient was seen walking in hallway before examination accompanied by physical therapy  Patient was seen with her son present  Patient described some double vision and seeing multiple people whenever someone moved, as if she had a visual lag  Patient also had difficulty reading  Her son states that her vision problems are new in the last few days; he also states that she is more agitated and confused than normal  Her son states that she is typically orientated to her location, and is concerned because she is confused currently  She is anxious to go back home  Son states that she is very different from her typical baseline   Denies any fever/chills, chest pain/sob, headache  Objective:   Vitals: Blood pressure 158/80, pulse (!) 50, temperature 98 4 °F (36 9 °C), resp  rate 18, height 4' 11" (1 499 m), weight 61 6 kg (135 lb 12 9 oz), last menstrual period 05/29/1998, SpO2 97 %, not currently breastfeeding  ,Body mass index is 27 43 kg/m²  Wt Readings from Last 3 Encounters:   11/11/19 61 6 kg (135 lb 12 9 oz)   06/06/19 59 1 kg (130 lb 4 oz)   01/31/19 63 5 kg (139 lb 14 4 oz)       Intake/Output Summary (Last 24 hours) at 11/12/2019 2144  Last data filed at 11/12/2019 1944  Gross per 24 hour   Intake 1999 ml   Output 350 ml   Net 1649 ml     Physical Exam: Physical Exam   Constitutional: She appears well-developed and well-nourished  HENT:   Head: Normocephalic and atraumatic  Eyes: Pupils are equal, round, and reactive to light  Right eye exhibits nystagmus (Left beating)  Left eye exhibits nystagmus (Left beating)  Left upper quadrantanopia    Neck: Normal range of motion  Neck supple  Cardiovascular: Regular rhythm and intact distal pulses  Bradycardia present  Exam reveals no gallop and no friction rub  No murmur heard  Pulmonary/Chest: Effort normal and breath sounds normal  No respiratory distress  She has no wheezes  She has no rales  Neurological: She is alert  She is disoriented  Gait normal    7/30 on MOCA exam  No neglect  Skin: Skin is warm  Psychiatric: Her mood appears anxious  Her affect is angry and labile  Her speech is slurred (slightly)  She is agitated  Thought content is paranoid (skeptical of her surroundings)  Cognition and memory are impaired  She exhibits abnormal recent memory  She is attentive  Vitals reviewed          Recent Results (from the past 24 hour(s))   Comprehensive metabolic panel    Collection Time: 11/12/19  4:59 AM   Result Value Ref Range    Sodium 136 136 - 145 mmol/L    Potassium 4 1 3 5 - 5 3 mmol/L    Chloride 103 100 - 108 mmol/L    CO2 23 21 - 32 mmol/L    ANION GAP 10 4 - 13 mmol/L    BUN 15 5 - 25 mg/dL    Creatinine 0 86 0 60 - 1 30 mg/dL    Glucose 104 65 - 140 mg/dL    Glucose, Fasting 104 (H) 65 - 99 mg/dL    Calcium 8 4 8 3 - 10 1 mg/dL    AST 22 5 - 45 U/L    ALT 22 12 - 78 U/L    Alkaline Phosphatase 101 46 - 116 U/L    Total Protein 6 6 6 4 - 8 2 g/dL    Albumin 3 4 (L) 3 5 - 5 0 g/dL    Total Bilirubin 1 40 (H) 0 20 - 1 00 mg/dL    eGFR 68 ml/min/1 73sq m   CBC and differential    Collection Time: 11/12/19  4:59 AM   Result Value Ref Range    WBC 9 39 4 31 - 10 16 Thousand/uL    RBC 4 45 3 81 - 5 12 Million/uL    Hemoglobin 13 8 11 5 - 15 4 g/dL    Hematocrit 42 5 34 8 - 46 1 %    MCV 96 82 - 98 fL    MCH 31 0 26 8 - 34 3 pg    MCHC 32 5 31 4 - 37 4 g/dL    RDW 15 0 11 6 - 15 1 %    MPV 11 0 8 9 - 12 7 fL    Platelets 187 178 - 318 Thousands/uL    nRBC 0 /100 WBCs    Neutrophils Relative 58 43 - 75 %    Immat GRANS % 0 0 - 2 %    Lymphocytes Relative 29 14 - 44 %    Monocytes Relative 12 4 - 12 %    Eosinophils Relative 1 0 - 6 %    Basophils Relative 0 0 - 1 %    Neutrophils Absolute 5 43 1 85 - 7 62 Thousands/µL    Immature Grans Absolute 0 02 0 00 - 0 20 Thousand/uL    Lymphocytes Absolute 2 71 0 60 - 4 47 Thousands/µL    Monocytes Absolute 1 14 0 17 - 1 22 Thousand/µL    Eosinophils Absolute 0 06 0 00 - 0 61 Thousand/µL    Basophils Absolute 0 03 0 00 - 0 10 Thousands/µL   Magnesium    Collection Time: 11/12/19  4:59 AM   Result Value Ref Range    Magnesium 1 8 1 6 - 2 6 mg/dL   Phosphorus    Collection Time: 11/12/19  4:59 AM   Result Value Ref Range    Phosphorus 2 6 2 3 - 4 1 mg/dL   Procalcitonin    Collection Time: 11/12/19  4:59 AM   Result Value Ref Range    Procalcitonin <0 05 <=0 25 ng/ml   Fingerstick Glucose (POCT)    Collection Time: 11/12/19  7:36 AM   Result Value Ref Range    POC Glucose 103 65 - 140 mg/dl   Fingerstick Glucose (POCT)    Collection Time: 11/12/19 11:16 AM   Result Value Ref Range    POC Glucose 94 65 - 140 mg/dl   Fingerstick Glucose (POCT)    Collection Time: 11/12/19  4:26 PM   Result Value Ref Range    POC Glucose 172 (H) 65 - 140 mg/dl   Fingerstick Glucose (POCT)    Collection Time: 11/12/19  9:06 PM   Result Value Ref Range    POC Glucose 110 65 - 140 mg/dl       Current Facility-Administered Medications   Medication Dose Route Frequency Provider Last Rate Last Dose    atorvastatin (LIPITOR) tablet 40 mg  40 mg Oral Daily With Rasta Ireland MD   40 mg at 11/12/19 1705    calcium carbonate (TUMS) chewable tablet 1,000 mg  1,000 mg Oral Daily PRN Montez Clinton MD        docusate sodium (COLACE) capsule 100 mg  100 mg Oral BID Montez Clinton MD   100 mg at 11/12/19 1705    enoxaparin (LOVENOX) subcutaneous injection 40 mg  40 mg Subcutaneous Daily Montez Clinton MD   40 mg at 11/12/19 0943    hydrocortisone 2 5 % ointment   Topical BID Montez Clinton MD        insulin lispro (HumaLOG) 100 units/mL subcutaneous injection 1-5 Units  1-5 Units Subcutaneous 4x Daily (AC & HS) Montez Clinton MD   1 Units at 11/12/19 1706    ipratropium-albuterol (DUO-NEB) 0 5-2 5 mg/3 mL inhalation solution 3 mL  3 mL Nebulization Q6H PRN Montez Clinton MD        lisinopril (ZESTRIL) tablet 20 mg  20 mg Oral Daily Montez Clinton MD   20 mg at 11/12/19 0943    ondansetron (ZOFRAN) injection 4 mg  4 mg Intravenous Q6H PRN Montez Clinton MD        [facility]) tablet 8 6 mg  1 tablet Oral HS PRN Montez Clinton MD        sodium chloride 0 9 % infusion  100 mL/hr Intravenous Continuous Montez Clinton  mL/hr at 11/12/19 1944 100 mL/hr at 11/12/19 1944       Invasive Devices     Peripheral Intravenous Line            Peripheral IV 11/11/19 Right Antecubital 1 day    Peripheral IV 11/12/19 Distal;Dorsal (posterior); Right Forearm less than 1 day              MRI brain w wo contrast   Final Result      Cortical thickening in the undersurface of the right posterior temporal lobe extending to the occipital lobe  No vasogenic edema  Minor parenchymal enhancement and minor focal diffusion restriction  Concern for neoplasm, to include lymphoma  Lumbar    puncture is suggested  The study was marked in [facility] for immediate notification        Workstation performed: [id number]         CTA head and neck w wo contrast   Final Result Subtle area of high density in the undersurface of the posterior right temporal lobe  Minor reactive prominence of blood vessels in this region  See contemporary MR  No large vessel flow restrictive disease within the head or neck despite diffuse, minor atherosclerotic changes                     Workstation performed: [id number]         US right upper quadrant   Final Result      Cholelithiasis without evidence of acute cholecystitis  Workstation performed: [id number]         CT head without contrast   Final Result      No acute intracranial abnormality  Microangiopathic changes  Workstation performed: [id number]         XR chest 1 view portable   Final Result      No acute cardiopulmonary disease  Workstation performed: [id number]         CT chest abdomen pelvis w contrast    (Results Pending)         Lab, Imaging and other studies: I have personally reviewed pertinent reports      VTE Pharmacologic Prophylaxis: Enoxaparin (Lovenox)  VTE Mechanical Prophylaxis: sequential compression device    Marisol Dooley MD

## 2019-11-12 NOTE — CONSULTS
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   3  Encephalopathy   4  HTN urgency    Patient's initial aphasia and visual field defect on left was concerning for stroke, so we ordered MRI brain and CTA head and neck  Her MRI brain returns with finding of right posterior temporal and occipital region neoplasm, likely lymphoma  IR guided LP ordered for tomm  Csf labs ordered including csf cytology and flow cytometry  Consult heme/onc  Order CT chest/abd/pelvis to r/o malignancy elsewhere  Will defer PET scan upto oncology  If surgery indicated, will refer patient to Dr Tico Owens  Recommend normotension  D/c aspirin  Neuro checks q4h  Unless there's seizure, will hold off on AED at this point  Will get one time EEG  Discussed plan at length with team          HPI:  Nicolette Britt is a 70 yo F with PMH of DM II, HTN, HLD presents with cc of disorientation  According to son, patient was in usual state of health until Sunday night  Yesterday morning patient's son was surprised not to see her downstairs making his coffee  He went up to her bed and patient responded ok i'll be down  Patient had to go back up 2-3 times and she still had not gotten up  When son left for work, within minutes he got a call from his wife that patient was not acting right  She was not answering questions appropriately  Today she appears to have left upper quadrant visual field deficit but she's not aware of it  Her comprehension is impaired  Son states that patient is very independent at baseline         Past Medical History:   Diagnosis Date    Bacterial pneumonia     Last Assessed: 11/14/2013    Breast cancer ([facility] )     hx    Diabetes mellitus ([facility] )     Hyperlipidemia     Hypertension        Past Surgical History:   Procedure Laterality Date    COMPLETE MASTECTOMY W/ SENTINEL NODE BIOPSY Left     Resolved: 6/19/2007       Allergies   Allergen Reactions    Percocet [Oxycodone-Acetaminophen]          Current Facility-Administered Medications:     atorvastatin (LIPITOR) tablet 40 mg, 40 mg, Oral, Daily With Sreekanth Russo MD    calcium carbonate (TUMS) chewable tablet 1,000 mg, 1,000 mg, Oral, Daily PRN, Deonte Orozco MD    docusate sodium (COLACE) capsule 100 mg, 100 mg, Oral, BID, Deonte Orozco MD, 100 mg at 11/12/19 0943    enoxaparin (LOVENOX) subcutaneous injection 40 mg, 40 mg, Subcutaneous, Daily, Deonte Orozco MD, 40 mg at 11/12/19 0943    hydrocortisone 2 5 % ointment, , Topical, BID, Deonte Orozco MD    insulin lispro (HumaLOG) 100 units/mL subcutaneous injection 1-5 Units, 1-5 Units, Subcutaneous, 4x Daily (AC & HS), 1 Units at 11/11/19 1713 **AND** Fingerstick Glucose (POCT), , , TID AC, Deonte Orozco MD    ipratropium-albuterol (DUO-NEB) 0 5-2 5 mg/3 mL inhalation solution 3 mL, 3 mL, Nebulization, Q6H PRN, Deonte Orozco MD    lisinopril (ZESTRIL) tablet 20 mg, 20 mg, Oral, Daily, Deonte Orozco MD, 20 mg at 11/12/19 0943    ondansetron (ZOFRAN) injection 4 mg, 4 mg, Intravenous, Q6H PRN, Deonte Orozco MD    [facility]) tablet 8 6 mg, 1 tablet, Oral, HS PRN, Deonte Orozco MD    sodium chloride 0 9 % bolus 500 mL, 500 mL, Intravenous, Once, Deonte Orozco MD    sodium chloride 0 9 % infusion, 100 mL/hr, Intravenous, Continuous, Deonte Orozco MD, Last Rate: 100 mL/hr at 11/12/19 1243, 100 mL/hr at 11/12/19 1243    Social History     Socioeconomic History    Marital status: Single     Spouse name: Not on file    Number of children: Not on file    Years of education: Not on file    Highest education level: Not on file   Occupational History    Not on file   Social Needs    Financial resource strain: Not on file    Food insecurity:     Worry: Not on file     Inability: Not on file    Transportation needs:     Medical: Not on file     Non-medical: Not on file   Tobacco Use    Smoking status: Current Every Day Smoker     Packs/day: 1 00    Smokeless tobacco: Current User   Substance and Sexual Activity    Alcohol use: Not Currently    Drug use: Not Currently    Sexual activity: Not Currently   Lifestyle    Physical activity:     Days per week: Not on file     Minutes per session: Not on file    Stress: Not on file   Relationships    Social connections:     Talks on phone: Not on file     Gets together: Not on file     Attends Religion service: Not on file     Active member of club or organization: Not on file     Attends meetings of clubs or organizations: Not on file     Relationship status: Not on file    Intimate partner violence:     Fear of current or ex partner: Not on file     Emotionally abused: Not on file     Physically abused: Not on file     Forced sexual activity: Not on file   Other Topics Concern    Not on file   Social History Narrative    Not on file       Family History   Problem Relation Age of Onset    Heart disease Mother     Heart disease Father          Review of systems:  Please see HPI for positive symptoms  No fever, no chills, no weight change  Ocular: No drainage, + blurred/loss of vision  HEENT:  No sore throat, earache, or congestion  No neck pain  COR:  No chest pain  No palpitations  Lungs:  no sob, wheezing,  GI:  no  nausea, no vomiting, no diarrhea, no constipation, no anorexia  :  No dysuria, frequency, or urgency  No hematuria  Musculoskeletal:  No joint pain or swelling or edema  Skin:  No rash or itching  Psychiatric:  no anxiety, no depression  Endocrine:  No polyuria or polydipsia      Physical examination:  Vitals:    11/12/19 1602   BP: 157/70   Pulse: (!) 50   Resp: 18   Temp: 98 1 °F (36 7 °C)   SpO2: 97% GENERAL APPEARANCE:  The patient is alert, oriented  He is in no acute distress  HEENT:  Head is normocephalic  The sinuses are otherwise nontender  Pupils are equal and reactive  NECK:  Supple without lymphadenopathy  HEART:  Regular rate and rhythm  LUNGS:  clear to auscultation  No crackles or wheezes are heard  ABDOMEN:  Soft, nontender, nondistended with good bowel sounds heard  EXTREMITIES:  Without cyanosis, clubbing or edema  Mental status: The patient is alert, attentive, and oriented to person only  She has some expressive and receptive aphasia  Cranial nerves:  CN II: Visual fields: left eye: left upper quadrant visual field loss  Fundoscopic exam is normal with sharp discs and no vascular changes    Pupils are 3 mm and  reactive to light  CN III, IV, VI: At primary gaze, there is no eye deviation  CN V: Facial sensation is intact to pinprick in all 3 divisions bilaterally  Corneal responses are intact  CN VII: Face is symmetric with normal eye closure and smile  CN VIII: Hearing is normal to rubbing fingers  CN IX, X: Palate elevates symmetrically  Phonation is normal   CN XI: Head turning and shoulder shrug are intact  CN XII: Tongue is midline with normal movements and no atrophy  Motor: There is no pronator drift of out-stretched arms  Muscle bulk and tone are normal      Muscle exam  Arm Right Left Leg Right Left   Deltoid 5/5 4+/5 Iliopsoas 5/5 5/5   Biceps 5/5 5/5 Quads 5/5 5/5   Triceps 5/5 5/5 Hamstrings 5/5 5/5   Wrist Extension 5/5 5/5 Ankle Dorsi Flexion 5/5 5/5   Wrist Flexion 5/5 5/5 Ankle Plantar Flexion 5/5 5/5   Interossei 5/5 5/5 Ankle Eversion 5/5 5/5   APB 5/5 5/5 Ankle Inversion 5/5 5/5       Reflexes   RJ BJ TJ KJ AJ Plantars Biggs's   Right 2+ 2+ 2+ 2+ 2+ Downgoing Not present   Left 2+ 2+ 2+ 2+ 2+ Downgoing Not present     Sensory:  Light touch, pinprick, position sense, and vibration sense are intact in fingers and toes    Coordination:  Rapid alternating movements and fine finger movements are intact  There is no dysmetria on finger-to-nose and heel-knee-shin  There are no abnormal or extraneous movements  Romberg negative  Gait/Stance:  Mild assistance and unsteady but able to ambulate  Lab Results   Component Value Date    WBC 9 39 11/12/2019    HGB 13 8 11/12/2019    HCT 42 5 11/12/2019    MCV 96 11/12/2019     11/12/2019     Lab Results   Component Value Date    HGBA1C 6 5 (H) 11/11/2019     Lab Results   Component Value Date    ALT 22 11/12/2019    AST 22 11/12/2019    GGT 58 11/11/2019    ALKPHOS 101 11/12/2019     Lab Results   Component Value Date    CALCIUM 8 4 11/12/2019    K 4 1 11/12/2019    CO2 23 11/12/2019     11/12/2019    BUN 15 11/12/2019    CREATININE 0 86 11/12/2019         Radiology          Review of reports and notes reveal:  Xr Chest 1 View Portable    Result Date: 11/11/2019  No acute cardiopulmonary disease  Workstation performed: [id number]   Us Right Upper Quadrant    Result Date: 11/12/2019  Cholelithiasis without evidence of acute cholecystitis  Workstation performed: [id number]       Independent Interpretation of images and review of reports:  Cta Head And Neck W Wo Contrast    Result Date: 11/12/2019  Subtle area of high density in the undersurface of the posterior right temporal lobe  Minor reactive prominence of blood vessels in this region  See contemporary MR  No large vessel flow restrictive disease within the head or neck despite diffuse, minor atherosclerotic changes  Ct Head Without Contrast    Result Date: 11/11/2019  No acute intracranial abnormality  Microangiopathic changes  Workstation performed: [id number]     Mri Brain W Wo Contrast    Result Date: 11/12/2019  Cortical thickening in the undersurface of the right posterior temporal lobe extending to the occipital lobe  No vasogenic edema  Minor parenchymal enhancement and minor focal diffusion restriction    Concern for neoplasm, to include lymphoma  Lumbar puncture is suggested  The study was marked in [facility] for immediate notification  Workstation performed: [id number]             Thank you for this consult  Total time of encounter: 80+ min  More than 50% of time was spent in counseling and coordination of care of patient  EFREN Corrigan    [facility]  [address]  [place] [address]

## 2019-11-12 NOTE — UTILIZATION REVIEW
Initial Clinical Review    Admission: Date/Time/Statement:   Orders Placed This Encounter   Procedures    Place in Observation     Standing Status:   Standing     Number of Occurrences:   1     Order Specific Question:   Admitting Physician     Answer:   Chapo Frye     Order Specific Question:   Level of Care     Answer:   Med Surg [16]     ED Arrival Information     Expected Arrival Acuity Means of Arrival Escorted By Service Admission Type    - 11/11/2019 09:24 Urgent Walk-In Family Member General Medicine Urgent    Arrival Complaint    Vomiting, Disorienated        Chief Complaint   Patient presents with    Weakness - Generalized    Diarrhea    Headache     sx started this am     Assessment/Plan: Assessment & Plan:  Patient is a 82-year-old female chronic smoker with hypertension and diabetes presenting with altered mental status without a clear cause; questionably from metabolic encephalopathy vs UTI  Patient is admitted under professional care of Dr Clayton Donnelly, with an anticipated duration of stay of less than 1 midnight      * Disorientation  Assessment & Plan  Patient was found confused in the morning  - No hypoglycemia or hyperglycemia with anion gap metabolic acidosis  - CT head & CXR were unremarkable   - 100 ml/hr IV NS infusion for fluid hydration, as patient's CBC may indicate hemoconcentration from dehydration  - repeat UA with UCx to r/o UTI, as she has a history of urinary incontinence, and the initial UA showed innumerable mucus threads; patient is now also running a low fever  No leukocytosis or lactic acidosis (1 8)  - Blood Cx pending   - Procalcitonin pending  - PT/OT   - Consider outpatient neuro eval for dementia once d/c   - Patient was mildly febrile after transfer to the medical floor   Will start her on broad spectrum antibiotics if patient spikes a fever with an elevated procal       Hypertensive urgency  Assessment & Plan  Upon arrival, patient's blood pressure was elevated at critical range of 206/84  Currently the blood pressure has gone down to 164/73  Patient is asymptomatic, denying headaches, blurry vision, or abdominal pain  Patient reports she takes her medications as instructed  - Will continue to monitor  - Continue patient's daily home BP med lisinopriol 20 mg PO daily       Total bilirubin, elevated  Assessment & Plan  Patient has an elevated total bilirubin of 1 1 on admission accompanied with elevated Alk Phos of 126  May indicate obstructive biliary pathology that may be affecting her mentation   - Direct Bili  - GGT  - RUQ U/S      Essential hypertension  Assessment & Plan  See Hypertensive Urgency      Controlled type 2 diabetes mellitus without complication, without long-term current use of insulin (HCC)  Assessment & Plan        Lab Results   Component Value Date     HGBA1C 6 4 (H) 09/11/2018         No results for input(s): POCGLU in the last 72 hours      Blood Sugar Average: Last 72 hrs:  Patient's current blood sugar is 145  - HbA1C has not been checked for 14 months   HbA1C ordered  - continue home meds 500 mg daily   - ISS 1-5 U, titrate as necessary     F - 100 ml/hr IVNS   E - monitor and correct as necessary  N - Diabetic Diet   D - SQL & SCD    ED Triage Vitals [11/11/19 0934]   Temperature Pulse Respirations Blood Pressure SpO2   99 2 °F (37 3 °C) 72 (!) 24 (!) 206/84 93 %      Temp Source Heart Rate Source Patient Position - Orthostatic VS BP Location FiO2 (%)   Tympanic Monitor Sitting Right arm --      Pain Score       4        Wt Readings from Last 1 Encounters:   11/11/19 61 6 kg (135 lb 12 9 oz)     Additional Vital Signs:   11/12/19 0330  98 9 °F (37 2 °C)  50Abnormal   18  156/69  94 %  None (Room air)  Lying   11/11/19 2330  97 9 °F (36 6 °C)  50Abnormal   18  135/63  95 %  None (Room air)  Lying   11/11/19 2058            None (Room air)     11/11/19 1954  98 6 °F (37 °C)  55  18  147/70  96 %  None (Room air)  Lying   11/11/19 1522  100 1 °F (37 8 °C)  56  18  164/73  97 %  None (Room air)  Lying   11/11/19 1500  99 °F (37 2 °C)               11/11/19 1425    59  18    97 %  None (Room air)     11/11/19 1329  100 5 °F (38 1 °C)  55  18  163/72  94 %  None (Room air)  Lying   11/11/19 1239    55  22  174/74Abnormal   96 %           Pertinent Labs/Diagnostic Test Results:   Ct head No acute intracranial abnormality   Microangiopathic changes  cxr No acute cardiopulmonary disease    Results from last 7 days   Lab Units 11/12/19  0459 11/11/19  0950   WBC Thousand/uL 9 39 10 28*   HEMOGLOBIN g/dL 13 8 16 4*   HEMATOCRIT % 42 5 51 3*   PLATELETS Thousands/uL 168 190   NEUTROS ABS Thousands/µL 5 43 8 40*         Results from last 7 days   Lab Units 11/12/19  0459 11/11/19  0950   SODIUM mmol/L 136 137   POTASSIUM mmol/L 4 1 5 6*   CHLORIDE mmol/L 103 101   CO2 mmol/L 23 27   ANION GAP mmol/L 10 9   BUN mg/dL 15 17   CREATININE mg/dL 0 86 0 97   EGFR ml/min/1 73sq m 68 59   CALCIUM mg/dL 8 4 9 2   MAGNESIUM mg/dL 1 8  --    PHOSPHORUS mg/dL 2 6  --      Results from last 7 days   Lab Units 11/12/19  0459 11/11/19  0950   AST U/L 22 44   ALT U/L 22 32   ALK PHOS U/L 101 126*   TOTAL PROTEIN g/dL 6 6 8 1   ALBUMIN g/dL 3 4* 4 1   TOTAL BILIRUBIN mg/dL 1 40* 1 10*   BILIRUBIN DIRECT mg/dL  --  0 10     Results from last 7 days   Lab Units 11/11/19  2121 11/11/19  1640   POC GLUCOSE mg/dl 95 153*     Results from last 7 days   Lab Units 11/12/19  0459 11/11/19  0950   GLUCOSE RANDOM mg/dL 104 145*         Results from last 7 days   Lab Units 11/11/19  0950   HEMOGLOBIN A1C % 6 5*   EAG mg/dl 140     Results from last 7 days   Lab Units 11/11/19  0950   PROTIME seconds 10 3   INR  0 96   PTT seconds 24*     Results from last 7 days   Lab Units 11/11/19  0950   LACTIC ACID mmol/L 1 8     Results from last 7 days   Lab Units 11/11/19  1656 11/11/19  1100   CLARITY UA  Slightly Cloudy Clear   COLOR UA  Yellow Yellow   SPEC GRAV UA  1 020 >=1 030   PH UA  6 0 6 0   GLUCOSE UA mg/dl Negative Negative   KETONES UA mg/dl Negative Negative   BLOOD UA  Moderate* Small*   PROTEIN UA mg/dl Negative 30 (1+)*   NITRITE UA  Negative Negative   BILIRUBIN UA  Negative Negative   UROBILINOGEN UA E U /dl 1 0 1 0   LEUKOCYTES UA  Small* Negative   WBC UA /hpf 10-20* 1-2*   RBC UA /hpf 10-20* 0-1*   BACTERIA UA /hpf Moderate* None Seen   EPITHELIAL CELLS WET PREP /hpf Moderate* Occasional   MUCUS THREADS  Moderate* Innumerable*         Results from last 7 days   Lab Units 11/11/19  0957 11/11/19  0950   BLOOD CULTURE  Received in Microbiology Lab  Culture in Progress  Received in Microbiology Lab  Culture in Progress                 ED Treatment:   Medication Administration from 11/11/2019 0924 to 11/11/2019 1317     None        Past Medical History:   Diagnosis Date    Bacterial pneumonia     Last Assessed: 11/14/2013    Breast cancer ([facility] )     hx    Diabetes mellitus ([facility] )     Hyperlipidemia     Hypertension      Present on Admission:   Essential hypertension   Controlled type 2 diabetes mellitus without complication, without long-term current use of insulin ([facility])      Admitting Diagnosis: Dehydration [E86 0]  Weakness [R53 1]  AMS (altered mental status) [R41 82]  Age/Sex: 69 y o  female  Admission Orders:  Observation  Us ruq   Urine cs   Scheduled Medications:    Medications:  aspirin 81 mg Oral Daily   atorvastatin 80 mg Oral Daily With Dinner   docusate sodium 100 mg Oral BID   enoxaparin 40 mg Subcutaneous Daily   hydrocortisone  Topical BID   insulin lispro 1-5 Units Subcutaneous 4x Daily (AC & HS)   lisinopril 20 mg Oral Daily   metFORMIN 500 mg Oral Daily With Breakfast     Continuous IV Infusions:    sodium chloride 100 mL/hr Intravenous Continuous     PRN Meds:    calcium carbonate 1,000 mg Oral Daily PRN   ipratropium-albuterol 3 mL Nebulization Q6H PRN   ondansetron 4 mg Intravenous Q6H PRN   senna 1 tablet Oral HS PRN       None    Network Utilization Review Department  [email or web address]  ATTENTION: Please call with any questions or concerns to [phone] and carefully listen to the prompts so that you are directed to the right person  All voicemails are confidential   Nery Davis all requests for admission clinical reviews, approved or denied determinations and any other requests to dedicated fax number below belonging to the campus where the patient is receiving treatment    FACILITY NAME UR FAX NUMBER   ADMISSION DENIALS (Administrative/Medical Necessity) [address] [facility] (Maternity/NICU/Pediatrics) [phone]   [facility] [address] [facility] [phone]   [address] [place] [address] 15[address] [facility] [phone]   [facility] [address] [facility] [address] [place] [address] [phone]

## 2019-11-12 NOTE — UTILIZATION REVIEW
Initial Clinical Review    PATIENT WAS OBSERVATION STSTUS 11/11/19 CONVERTED TO INPATIENT ADMISSION FOR CONTINUED TESTING MRI CTA HEAD NECK  AND LABS     Admission: Date/Time/Statement: Inpatient Admission Orders (From admission, onward)     Ordered        11/12/19 1217  Inpatient Admission  Once                   Orders Placed This Encounter   Procedures    Inpatient Admission     Standing Status:   Standing     Number of Occurrences:   1     Order Specific Question:   Admitting Physician     Answer:   Radha Clock     Order Specific Question:   Level of Care     Answer:   Med Surg [16]     Order Specific Question:   Estimated length of stay     Answer:   More than 2 Midnights     Order Specific Question:   Certification     Answer:   I certify that inpatient services are medically necessary for this patient for a duration of greater than two midnights  See H&P and MD Progress Notes for additional information about the patient's course of treatment       ED Arrival Information     Expected Arrival Acuity Means of Arrival Escorted By Service Admission Type    - 11/11/2019 09:24 Urgent Walk-In Family Member General Medicine Urgent    Arrival Complaint    Vomiting, Disorienated        Chief Complaint   Patient presents with    Weakness - Generalized    Diarrhea    Headache     sx started this am     Assessment/Plan:   ED Triage Vitals [11/11/19 0934]   Temperature Pulse Respirations Blood Pressure SpO2   99 2 °F (37 3 °C) 72 (!) 24 (!) 206/84 93 %      Temp Source Heart Rate Source Patient Position - Orthostatic VS BP Location FiO2 (%)   Tympanic Monitor Sitting Right arm --      Pain Score       4        Wt Readings from Last 1 Encounters:   11/11/19 61 6 kg (135 lb 12 9 oz)     Additional Vital Signs:   Pertinent Labs/Diagnostic Test Results:   Results from last 7 days   Lab Units 11/12/19  0459 11/11/19  0950   WBC Thousand/uL 9 39 10 28*   HEMOGLOBIN g/dL 13 8 16 4*   HEMATOCRIT % 42 5 51 3*   PLATELETS Thousands/uL 168 190   NEUTROS ABS Thousands/µL 5 43 8 40*         Results from last 7 days   Lab Units 11/12/19  0459 11/11/19  0950   SODIUM mmol/L 136 137   POTASSIUM mmol/L 4 1 5 6*   CHLORIDE mmol/L 103 101   CO2 mmol/L 23 27   ANION GAP mmol/L 10 9   BUN mg/dL 15 17   CREATININE mg/dL 0 86 0 97   EGFR ml/min/1 73sq m 68 59   CALCIUM mg/dL 8 4 9 2   MAGNESIUM mg/dL 1 8  --    PHOSPHORUS mg/dL 2 6  --      Results from last 7 days   Lab Units 11/12/19  0459 11/11/19  0950   AST U/L 22 44   ALT U/L 22 32   ALK PHOS U/L 101 126*   TOTAL PROTEIN g/dL 6 6 8 1   ALBUMIN g/dL 3 4* 4 1   TOTAL BILIRUBIN mg/dL 1 40* 1 10*   BILIRUBIN DIRECT mg/dL  --  0 10     Results from last 7 days   Lab Units 11/12/19  1116 11/12/19  0736 11/11/19  2121 11/11/19  1640   POC GLUCOSE mg/dl 94 103 95 153*     Results from last 7 days   Lab Units 11/12/19  0459 11/11/19  0950   GLUCOSE RANDOM mg/dL 104 145*         Results from last 7 days   Lab Units 11/11/19  0950   HEMOGLOBIN A1C % 6 5*   EAG mg/dl 140     No results found for: BETA-HYDROXYBUTYRATE                           Results from last 7 days   Lab Units 11/11/19  0950   PROTIME seconds 10 3   INR  0 96   PTT seconds 24*         Results from last 7 days   Lab Units 11/12/19  0459   PROCALCITONIN ng/ml <0 05     Results from last 7 days   Lab Units 11/11/19  0950   LACTIC ACID mmol/L 1 8           Results from last 7 days   Lab Units 11/11/19  1656 11/11/19  1100   CLARITY UA  Slightly Cloudy Clear   COLOR UA  Yellow Yellow   SPEC GRAV UA  1 020 >=1 030   PH UA  6 0 6 0   GLUCOSE UA mg/dl Negative Negative   KETONES UA mg/dl Negative Negative   BLOOD UA  Moderate* Small*   PROTEIN UA mg/dl Negative 30 (1+)*   NITRITE UA  Negative Negative   BILIRUBIN UA  Negative Negative   UROBILINOGEN UA E U /dl 1 0 1 0   LEUKOCYTES UA  Small* Negative   WBC UA /hpf 10-20* 1-2*   RBC UA /hpf 10-20* 0-1*   BACTERIA UA /hpf Moderate* None Seen   EPITHELIAL CELLS WET PREP /hpf Moderate* Occasional   MUCUS THREADS  Moderate* Innumerable*                             Results from last 7 days   Lab Units 11/11/19  0957 11/11/19  0950   BLOOD CULTURE  No Growth at 24 hrs  No Growth at 24 hrs  ED Treatment:   Medication Administration from 11/11/2019 0924 to 11/11/2019 1317     None        Past Medical History:   Diagnosis Date    Bacterial pneumonia     Last Assessed: 11/14/2013    Breast cancer ([facility] )     hx    Diabetes mellitus ([facility] )     Hyperlipidemia     Hypertension      Present on Admission:   Essential hypertension   Controlled type 2 diabetes mellitus without complication, without long-term current use of insulin ([facility])      Admitting Diagnosis: Dehydration [E86 0]  Weakness [R53 1]  AMS (altered mental status) [R41 82]  Age/Sex: 69 y o  female  Admission Orders:  Scheduled Medications:    Medications:  aspirin 81 mg Oral Daily   atorvastatin 80 mg Oral Daily With Dinner   docusate sodium 100 mg Oral BID   enoxaparin 40 mg Subcutaneous Daily   hydrocortisone  Topical BID   insulin lispro 1-5 Units Subcutaneous 4x Daily (AC & HS)   lisinopril 20 mg Oral Daily     Continuous IV Infusions:    sodium chloride 100 mL/hr Intravenous Continuous     PRN Meds:    calcium carbonate 1,000 mg Oral Daily PRN   ipratropium-albuterol 3 mL Nebulization Q6H PRN   ondansetron 4 mg Intravenous Q6H PRN   senna 1 tablet Oral HS PRN       IP CONSULT TO NEUROLOGY    Network Utilization Review Department  [email or web address]  ATTENTION: Please call with any questions or concerns to [phone] and carefully listen to the prompts so that you are directed to the right person  All voicemails are confidential   Francine Murillo all requests for admission clinical reviews, approved or denied determinations and any other requests to dedicated fax number below belonging to the campus where the patient is receiving treatment    FACILITY NAME UR FAX NUMBER   ADMISSION DENIALS (Administrative/Medical Necessity) [address] [facility] (Maternity/NICU/Pediatrics) [place] [phone]   [place]lyn Nino [phone]   Josafat Paz [phone]   [address] [facility] 1525 [facility] [phone]   Helon Milder [address] [phone]

## 2019-11-12 NOTE — SOCIAL WORK
Pt is currently under observation stay  Pt resides with her son Bacilio Vega and his wife  Has no dme at home  No h/o hhc  No STR  Has been independent, but does not drive  Resides in a 2 story home with bed and bathroom upstairs  Does not have a POA or LW, will provide forms and educate  Uses the [facility] in [place] for meds  Recommendation from PT is outpatient vs hhc  Will discuss with pt's family  Explained role of cm, will follow if hhc is the final recommendation and family agrees  CM reviewed d/c planning process including the following: identifying help at home, patient preference for d/c planning needs, and availability of the treatment team to discuss questions or concerns patient and/or family may have regarding understanding of medications and recognizing signs and symptoms once discharged  CM also encouraged patient to follow up with all recommended appointments after discharge  Patient advised of importance for patient and family to participate in managing patient's medical well being

## 2019-11-12 NOTE — PHYSICAL THERAPY NOTE
PT EVALUATION       11/12/19 6950   Note Type   Note type Eval/Treat   Pain Assessment   Pain Assessment No/denies pain   Home Living   Type of [address] Two level;Bed/bath upstairs   Home Equipment   (none)   Prior Function   Level of [place] Independent with ADLs and functional mobility   Lives With   (son and DIL)   ADL Assistance Independent   Restrictions/Precautions   Other Precautions Visual impairment; Fall Risk; Chair Alarm; Bed Alarm   General   Additional Pertinent History Pt admitted with change in mental status now c/o blurry/double vision (MD aware)  Cognition   Arousal/Participation Cooperative   Orientation Level Oriented to person;Oriented to place;Oriented to situation;Oriented to time   Following Commands Follows one step commands without difficulty   RLE Assessment   RLE Assessment   (ROM WFL, MMT 4/)   LLE Assessment   LLE Assessment   (ROM WFL, MMT 4/5)   Transfers   Sit to Stand 5  Supervision   Stand to Sit 5  Supervision   Stand pivot 5  Supervision   Ambulation/Elevation   Gait Assistance 4  Minimal assist   Additional items Verbal cues  (mildly impulsive, mild balance loss)   Assistive Device   (none)   Distance 20 feet   Balance   Static Sitting Good   Dynamic Sitting Fair +   Static Standing Fair +   Dynamic Standing Fair   Activity Tolerance   Activity Tolerance Patient limited by fatigue   Assessment   Prognosis Good   Problem List Decreased strength; Impaired balance;Decreased endurance;Decreased mobility   Assessment Patient seen for Physical Therapy evaluation  Patient admitted with Disorientation  Comorbidities affecting patient's physical performance include: DM, htn  Personal factors affecting patient at time of initial evaluation include: lives in 2 story house, inability to navigate community distances, inability to navigate level surfaces without external assistance and inability to perform physical activity   Prior to admission, patient was independent with functional mobility without assistive device and independent with ADLS  Please find objective findings from Physical Therapy assessment regarding body systems outlined above with impairments and limitations including weakness, impaired balance, decreased endurance, decreased functional mobility tolerance, decreased safety awareness and fall risk  The Barthel Index was used as a functional outcome tool presenting with a score of 60 today indicating moderate limitations of functional mobility and ADLS  Patient's clinical presentation is currently unstable/unpredictable as seen in patient's presentation of increased fall risk and new onset of impairment of functional mobility  Pt would benefit from continued Physical Therapy treatment to address deficits as defined above and maximize level of functional mobility  As demonstrated by objective findings, the assigned level of complexity for this evaluation is high  Goals   Patient Goals "feel better"   STG Expiration Date 11/19/19   Short Term Goal #1 improve bed mobility to independent, improve transfers to independent, pt will ambulate 150 feet with supervision    LTG Expiration Date 11/26/19   Long Term Goal #1 independent ambulation 200 outdoor surfaces, independent up and down 10 steps so pt can get to the second floor of her home   Plan   Treatment/Interventions ADL retraining;Functional transfer training;LE strengthening/ROM; Elevations; Therapeutic exercise;Gait training;Bed mobility; Equipment eval/education;Patient/family training;Cognitive reorientation; Endurance training   PT Frequency 5x/wk   Recommendation   Recommendation Outpatient PT; Home PT   Barthel Index   Feeding 10   Bathing 0   Grooming Score 0   Dressing Score 5   Bladder Score 5   Bowels Score 10   Toilet Use Score 5   Transfers (Bed/Chair) Score 10   Mobility (Level Surface) Score 10   Stairs Score 5   Barthel Index Score 60   Licensure   [place] License Number  Vandana Elana PT [id number]         Time TZ:0200  Time [id number]  Total Time: 10      S:  "I have to go to the bathroom"  O:  Supervision to transfer to toilet, min assist to wash hands, supervision/min assist to ambulate in hallway x 150 feet with several standing rest breaks  One balance loss with self recovery noted  Pt became tearful at the end of her therapy session when she stated that she was worried that she would not be able to go home today  Pt comforted, MD aware of pt's concerns  A:  Pt tolerated activity well but needed cues for procedure of washing her hands and to find her room after ambulating in hallway      P:  Continue PT     Shaggy Polk, PT

## 2019-11-12 NOTE — OCCUPATIONAL THERAPY NOTE
OT Evaluation       11/12/19 1130   Note Type   Note type Eval only   Restrictions/Precautions   Other Precautions Cognitive; Bed Alarm; Chair Alarm;Visual impairment   Pain Assessment   Pain Assessment No/denies pain   Pain Score No Pain   Home Living   Type of Home House   Home Layout Two level;Bed/bath upstairs   Bathroom Shower/Tub Tub/shower unit   Bathroom Toilet Standard   Home Equipment   (None)   Prior Function   Level of [place] Independent with ADLs and functional mobility   Lives With Family  (Son and daughter in law)   ADL Assistance Independent   ADL   Eating Assistance 5  Supervision/Setup   Grooming Assistance 5  Supervision/Setup   [address] 4  Minimal Assistance   [facility] 4  [address] Deficit Don/doff R sock; Don/doff L sock; Supervision/safety; Increased time to complete   Toileting Assistance  4  Minimal Assistance   Bed Mobility   Supine to Sit 5  Supervision   Transfers   Sit to Stand 5  Supervision   Additional items   (Sit to stand from EOB)   Functional Mobility   Additional Comments Patient declined functional mobility; after medical residents entered room to assess patient's visual deficits patient became increasingly agitated, skeptical of doctors and therapist in room, anxious to go home, refused to participate further   Balance   Static Sitting Good   Dynamic Sitting Fair +   Static Standing Fair +   Activity Tolerance   Activity Tolerance Treatment limited secondary to agitation   Nurse Made Aware Yes, June   RUE Assessment   RUE Assessment WFL  (MMT grossly 4/5)   LUE Assessment   LUE Assessment WFL  (MMT grossly 4/5)   Vision-Basic Assessment   Current Vision Wears glasses only for reading   Vision - Complex Assessment   Ocular Range of Motion [facility] Able to track stimulus in all quads without difficulty   Visual Screen Results (+Nystagmus to bilateral eyes)   Additional Comments Patient with self report of double vision yesterday and previous day  On OT Evaluation patient reports no presence of diplopia  Patient appears with some visuo-perceptual and visual processing deficits however unable to fully assess due to increased patient agitation  Residents made aware of findings  Will continue to assess   Cognition   Overall Cognitive Status Impaired   Arousal/Participation Alert; Uncooperative   Attention Difficulty dividing attention   Orientation Level Oriented to person;Disoriented to time;Disoriented to situation;Oriented to place   Following Commands Follows one step commands without difficulty   Assessment   Limitation Decreased ADL status; Decreased UE strength;Decreased Safe judgement during ADL;Decreased cognition;Decreased endurance;Visual deficit; Decreased self-care trans;Decreased high-level ADLs   Prognosis Good   Assessment Patient evaluated by Occupational Therapy  Patient admitted with Disorientation  The patients occupational profile, medical and therapy history includes a extensive additional review of physical, cognitive, or psychosocial history related to current functional performance  Comorbidities affecting functional mobility and ADLS include: breast cancer, DM, HLD, HTN  Prior to admission, patient was independent with functional mobility without assistive device and independent with ADLS  The evaluation identifies the following performance deficits: weakness, impaired balance, decreased endurance, decreased coordination, increased fall risk, new onset of impairment of functional mobility, decreased ADLS, decreased activity tolerance, decreased safety awareness, impaired judgement, decreased strength and visual deficits, that result in activity limitations and/or participation restrictions   This evaluation requires clinical decision making of high complexity, because the patient presents with comorbidites that affect occupational performance and required significant modification of tasks or assistance with consideration of multiple treatment options  The Barthel Index was used as a functional outcome tool presenting with a score of 60, indicating moderate limitations of functional mobility and ADLS  Patient will benefit from skilled Occupational Therapy services to address above deficits and facilitate a safe return to prior level of function  Goals   Patient Goals To go home   STG Time Frame   (1-7 days)   Short Term Goal  Goals established to promote patient goal of going home:  Patient will increase standing tolerance to 10 minutes during ADL task to decrease assistance level and decrease fall risk; Patient will increase bed mobility to independent in preparation for ADLS and transfers; Patient will increase functional mobility to and from bathroom with rolling walker with supervision to increase performance with ADLS and to use a toilet; Patient will tolerate 10 minutes of UE ROM/strengthening to increase general activity tolerance and performance in ADLS/IADLS; Patient will improve functional activity tolerance to 10 minutes of sustained functional tasks to increase participation in basic self-care and decrease assistance level;  Patient will be able to to verbalize understanding and perform energy conservation/proper body mechanics during ADLS and functional mobility at least 75% of the time with minimal cueing to decrease signs of fatigue and increase stamina to return to prior level of function; Patient will increase static/dynamic sitting balance to fair+ to improve the ability to sit at edge of bed or on a chair for ADLS;  Patient will increase static/dynamic standing balance to fair to improve postural stability and decrease fall risk during standing ADLS and transfers  Patient will orient self x 4 with minimal verbal cues       LTG Time Frame   (8-14 days)   Long Term Goal Patient will increase standing tolerance to 15 minutes during ADL task to decrease assistance level and decrease fall risk; Patient will increase functional mobility to and from bathroom with rolling walker independently to increase performance with ADLS and to use a toilet; Patient will tolerate 15 minutes of UE ROM/strengthening to increase general activity tolerance and performance in ADLS/IADLS; Patient will improve functional activity tolerance to 15 minutes of sustained functional tasks to increase participation in basic self-care and decrease assistance level;  Patient will be able to to verbalize understanding and perform energy conservation/proper body mechanics during ADLS and functional mobility at least 90% of the time with nocueing to decrease signs of fatigue and increase stamina to return to prior level of function; Patient will increase static/dynamic sitting balance to good to improve the ability to sit at edge of bed or on a chair for ADLS;  Patient will increase static/dynamic standing balance to fair+ to improve postural stability and decrease fall risk during standing ADLS and transfers  Patient will orient self x 4 with no verbal cues  Functional Transfer Goals   Pt Will Transfer To Toilet   (STG supervision, LTG independent)   ADL Goals   Pt Will Perform Grooming   (LTG independent)   Pt Will Perform Bathing   (STG supervision, LTG independent)   Pt Will Perform UE Dressing   (STG supervision, LTG independent)   Pt Will Perform LE Dressing   (STG supervision, LTG independent)   Pt Will Perform Toileting   (STG supervision, LTG independent)   Plan   Treatment Interventions ADL retraining;Visual perceptual retraining;Functional transfer training;UE strengthening/ROM; Endurance training;Cognitive reorientation;Patient/family training;Continued evaluation; Energy conservation   Goal Expiration Date 11/26/19   OT Frequency 3-5x/wk   Recommendation   OT Discharge Recommendation Outpatient OT   Barthel Index   Feeding 10 Bathing 0   Grooming Score 0   Dressing Score 5   Bladder Score 5   Bowels Score 10   Toilet Use Score 5   Transfers (Bed/Chair) Score 10   Mobility (Level Surface) Score 10   Stairs Score 5   Barthel Index Score 61   Licensure   [place] License Number  Kvng Lesly, OTR/L [id number]

## 2019-11-13 PROBLEM — G96.89 CNS MASS: Status: ACTIVE | Noted: 2019-01-01

## 2019-11-13 PROBLEM — I16.0 HYPERTENSIVE URGENCY: Status: RESOLVED | Noted: 2019-01-01 | Resolved: 2019-01-01

## 2019-11-13 NOTE — PROGRESS NOTES
[facility] Progress Note - oNrah Lau 69 y o  female MRN: [id number]    Unit/Bed#: [address] [place] [address] Encounter: [id number]      Assessment/Plan:  * Disorientation  Assessment & Plan  Patient was found confused in the morning, more sleepy than normal  - No hypoglycemia or hyperglycemia with anion gap metabolic acidosis  - CT head & CXR were unremarkable   - 100 ml/hr IV NS infusion for fluid hydration, as patient's CBC may indicate hemoconcentration from dehydration  - repeat UA with UCx to r/o UTI, as she has a history of urinary incontinence, and the initial UA showed innumerable mucus threads; patient is now also running a low fever  No leukocytosis or lactic acidosis (1 8)  - Blood Cx pending   - Procalcitonin - normal   - PT/OT - both recommend outpatient therapies  - Pt more agitated and disoriented on day 2  Patient also endorsed persistent oscillating/double vision that started 3-4 days ago  Neuro Dr Kalyn Cuadra consulted  MRI & CTA Brain revealed findings suggestive of metastases or lymphoma to the R temperal-occipital region  · Lumbar puncture needed  · CT Chest / Abd / Pelvis - negative for masses appearing as malignancies  · Heme/onc consulted - see note  · EEG & AEDs held:  Eeg positive for recurrent seizures/spikes - per Neurology needs to be transported to an epilepsy monitoring unit    Hypertensive urgency  Assessment & Plan  Upon arrival, pt's BP was elevated at critical range of 206/84  Blood pressure has been difficult to control  - Will continue to monitor  - Continue patient's daily home BP med lisinopril now at increased dose of 40 mg daily  - hydralazine p r n  Available  - As per Dr Kalyn Cuadra, keep patient normotensive   Will consider adding additional antihypertensives if necessary     Altered mental status  Assessment & Plan  See disorientation section    Visual-spatial impairment  Assessment & Plan  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   - being evaluated by Neurology, and Hematology/Oncology  - mass warrants a brain biopsy from Neurosurgery at the [facility] [address]     Total bilirubin, elevated  Assessment & Plan  Patient has an elevated total bilirubin of 1 1 on admission accompanied with elevated Alk Phos of 126  May indicate obstructive biliary pathology that may be affecting her mentation   - Direct Bili and GGT normal; most like elevated total bilirubin from hemolysis  - RUQ U/S - cholelithiasis without cholecystitis   - assess as outpatient after other testing has been evaluated    Essential hypertension  Assessment & Plan  See Hypertensive Urgency     Controlled type 2 diabetes mellitus without complication, without long-term current use of insulin [facility])  Assessment & Plan  Lab Results   Component Value Date    HGBA1C 6 5 (H) 11/11/2019       Recent Labs     11/12/19  2106 11/13/19  0703 11/13/19  1124 11/13/19  1642   POCGLU 110 90 169* 80       Blood Sugar Average: Last 72 hrs:  (P) 740 [id number]   A1c well controlled at 6 5  - On Diabetic Diet   - ISS 1-5 U, titrate as needed  - home metformin being held due to testing    Subjective:   Patient seen and examined at bedside today  No acute overnight events  Patient's sons are present in the room  They understand that she now has documented brain mass is concerning for malignancy  They are upset but would like to pursue all needed measures  Patient however was told last night as well as this morning and has since forgotten  Poor short-term memory  No chest pain, fevers, chills, shortness of breath  Patient not on any oxygen  Did report some headache for which she received Tylenol  Objective:   Vitals: Blood pressure (!) 176/72, pulse 68, temperature 99 8 °F (37 7 °C), temperature source Tympanic, resp  rate 16, height 4' 11" (1 499 m), weight 61 6 kg (135 lb 12 9 oz), last menstrual period 05/29/1998, SpO2 97 %, not currently breastfeeding  ,Body mass index is 27 43 kg/m²    Wt Readings from Last 3 Encounters:   11/11/19 61 6 kg (135 lb 12 9 oz)   06/06/19 59 1 kg (130 lb 4 oz)   01/31/19 63 5 kg (139 lb 14 4 oz)       Intake/Output Summary (Last 24 hours) at 11/13/2019 1858  Last data filed at 11/13/2019 0524  Gross per 24 hour   Intake 2100 ml   Output 400 ml   Net 1700 ml     Physical Exam: General appearance: alert, cooperative and no distress  Head: Normocephalic, without obvious abnormality, atraumatic  Lungs: clear to auscultation bilaterally  Heart: regular rate and rhythm, S1, S2 normal, no murmur, click, rub or gallop  Abdomen: soft, non-tender; bowel sounds normal; no masses,  no organomegaly  Skin: Skin color, texture, turgor normal  No rashes or lesions     Recent Results (from the past 24 hour(s))   Fingerstick Glucose (POCT)    Collection Time: 11/12/19  9:06 PM   Result Value Ref Range    POC Glucose 110 65 - 140 mg/dl   Comprehensive metabolic panel    Collection Time: 11/13/19  6:20 AM   Result Value Ref Range    Sodium 135 (L) 136 - 145 mmol/L    Potassium 3 9 3 5 - 5 3 mmol/L    Chloride 104 100 - 108 mmol/L    CO2 22 21 - 32 mmol/L    ANION GAP 9 4 - 13 mmol/L    BUN 8 5 - 25 mg/dL    Creatinine 0 78 0 60 - 1 30 mg/dL    Glucose 104 65 - 140 mg/dL    Calcium 8 2 (L) 8 3 - 10 1 mg/dL    AST 25 5 - 45 U/L    ALT 22 12 - 78 U/L    Alkaline Phosphatase 99 46 - 116 U/L    Total Protein 6 6 6 4 - 8 2 g/dL    Albumin 3 4 (L) 3 5 - 5 0 g/dL    Total Bilirubin 1 40 (H) 0 20 - 1 00 mg/dL    eGFR 76 ml/min/1 73sq m   CBC and differential    Collection Time: 11/13/19  6:20 AM   Result Value Ref Range    WBC 8 10 4 31 - 10 16 Thousand/uL    RBC 4 59 3 81 - 5 12 Million/uL    Hemoglobin 14 2 11 5 - 15 4 g/dL    Hematocrit 46 1 34 8 - 46 1 %     (H) 82 - 98 fL    MCH 30 9 26 8 - 34 3 pg    MCHC 30 8 (L) 31 4 - 37 4 g/dL    RDW 14 6 11 6 - 15 1 %    MPV 10 7 8 9 - 12 7 fL    Platelets 678 739 - 552 Thousands/uL    nRBC 0 /100 WBCs    Neutrophils Relative 58 43 - 75 %    Immat GRANS % 0 0 - 2 %    Lymphocytes Relative 28 14 - 44 %    Monocytes Relative 13 (H) 4 - 12 %    Eosinophils Relative 1 0 - 6 %    Basophils Relative 0 0 - 1 %    Neutrophils Absolute 4 73 1 85 - 7 62 Thousands/µL    Immature Grans Absolute 0 02 0 00 - 0 20 Thousand/uL    Lymphocytes Absolute 2 23 0 60 - 4 47 Thousands/µL    Monocytes Absolute 1 05 0 17 - 1 22 Thousand/µL    Eosinophils Absolute 0 05 0 00 - 0 61 Thousand/µL    Basophils Absolute 0 02 0 00 - 0 10 Thousands/µL   Magnesium    Collection Time: 11/13/19  6:20 AM   Result Value Ref Range    Magnesium 1 7 1 6 - 2 6 mg/dL   Phosphorus    Collection Time: 11/13/19  6:20 AM   Result Value Ref Range    Phosphorus 2 1 (L) 2 3 - 4 1 mg/dL   CEA    Collection Time: 11/13/19  6:20 AM   Result Value Ref Range    CEA 3 4 (H) 0 0 - 3 0 ng/mL   AFP tumor marker    Collection Time: 11/13/19  6:20 AM   Result Value Ref Range    AFP TUMOR MARKER 4 6 0 5 - 8 ng/mL   Fingerstick Glucose (POCT)    Collection Time: 11/13/19  7:03 AM   Result Value Ref Range    POC Glucose 90 65 - 140 mg/dl   Fingerstick Glucose (POCT)    Collection Time: 11/13/19 11:24 AM   Result Value Ref Range    POC Glucose 169 (H) 65 - 140 mg/dl   Fingerstick Glucose (POCT)    Collection Time: 11/13/19  4:42 PM   Result Value Ref Range    POC Glucose 80 65 - 140 mg/dl       Current Facility-Administered Medications   Medication Dose Route Frequency Provider Last Rate Last Dose    acetaminophen (TYLENOL) tablet 650 mg  650 mg Oral Q6H PRN Brenda Aguayo MD   650 mg at 11/13/19 1553    atorvastatin (LIPITOR) tablet 40 mg  40 mg Oral Daily With Dawit Olmstead MD   40 mg at 11/13/19 1732    calcium carbonate (TUMS) chewable tablet 1,000 mg  1,000 mg Oral Daily PRN Brenda Aguayo MD        docusate sodium (COLACE) capsule 100 mg  100 mg Oral BID Brenda Aguayo MD   100 mg at 11/13/19 1732    enoxaparin (LOVENOX) subcutaneous injection 40 mg  40 mg Subcutaneous Daily Brenda Aguayo MD   40 mg at 11/13/19 0906    hydrALAZINE (APRESOLINE) injection 10 mg  10 mg Intravenous Once Lorri OanhDO jagruti        hydrALAZINE (APRESOLINE) injection 5 mg  5 mg Intravenous Q4H PRN Laura Lugo MD   5 mg at 11/13/19 1536    hydrocortisone 2 5 % ointment   Topical BID Zeke Marshall MD        insulin lispro (HumaLOG) 100 units/mL subcutaneous injection 1-5 Units  1-5 Units Subcutaneous 4x Daily (AC & HS) Zeke Marshall MD   1 Units at 11/13/19 1126    ipratropium-albuterol (DUO-NEB) 0 5-2 5 mg/3 mL inhalation solution 3 mL  3 mL Nebulization Q6H PRN Zeke Marshall MD        levETIRAcetam (KEPPRA) 1,000 mg in sodium chloride 0 9 % 100 mL IVPB  1,000 mg Intravenous Q12H [address] Mary Kay Lord  mL/hr at 11/13/19 1457 1,000 mg at 11/13/19 1457    lisinopril (ZESTRIL) tablet 40 mg  40 mg Oral Daily Laura Lugo MD   40 mg at 11/13/19 0905    ondansetron (ZOFRAN) injection 4 mg  4 mg Intravenous Q6H PRN Zeke Marshall MD        [facility]) tablet 8 6 mg  1 tablet Oral HS PRN Zeke Marshall MD         Invasive Devices     Peripheral Intravenous Line            Peripheral IV 11/11/19 Right Antecubital 2 days    Peripheral IV 11/12/19 Distal;Dorsal (posterior); Right Forearm 1 day              Lab, Imaging and other studies: I have personally reviewed pertinent reports      VTE Pharmacologic Prophylaxis: Reason for no pharmacologic prophylaxis Per Neuro - no need  VTE Mechanical Prophylaxis: sequential compression device    Zeke Marshall MD

## 2019-11-13 NOTE — PROGRESS NOTES
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   3  Encephalopathy   4  HTN urgency     Patient is found to have right posterior temporal and occipital region neoplasm, likely lymphoma  Patient's EEG is quite concerning for periodic electrographic seizures  We do not have capability of longer monitoring at [place] so will need to transfer her to [place] for EMU  For now, will start her on IV keppra 1000mg bid  IR guided LP is pending  Csf orders placed  CT chest/abd/pelvis is negative for malignancy  Heme/onc would like biopsy of tumor  For this also, she will need transfer as we do not have neurosurgery service available  I had reached out to Dr Mary Anne Godfrey but had not gotten a response  Patient is asked to be transferred and needs to be seen by neurosurgery there  Advise against any type of steroids as it can alter results of biopsy if lymphoma  No need for antiplatelet therapy at this time  Neuro checks q4h  Discussed plan at length with team         Chief Complaint:  ams  Subjective: There have been no clinical seizures  Patient is still disoriented  She's pleasant and can make conversations but doesn't answer questions correctly       Past Medical History:   Diagnosis Date    Bacterial pneumonia     Last Assessed: 11/14/2013    Breast cancer (HCC)     hx    [facility]     Hyperlipidemia     Hypertension      Social History     Socioeconomic History    Marital status: Single     Spouse name: Not on file    Number of children: Not on file    Years of education: Not on file    Highest education level: Not on file   Occupational History    Not on file   Social Needs    Financial resource strain: Not on file    Food insecurity:     Worry: Not on file     Inability: Not on file    Transportation needs:     Medical: Not on file     Non-medical: Not on file   Tobacco Use    Smoking status: Current Every Day Smoker     Packs/day: 1 00    Smokeless tobacco: Current User   Substance and Sexual Activity    Alcohol use: Not Currently    Drug use: Not Currently    Sexual activity: Not Currently   Lifestyle    Physical activity:     Days per week: Not on file     Minutes per session: Not on file    Stress: Not on file   Relationships    Social connections:     Talks on phone: Not on file     Gets together: Not on file     Attends Congregation service: Not on file     Active member of club or organization: Not on file     Attends meetings of clubs or organizations: Not on file     Relationship status: Not on file    Intimate partner violence:     Fear of current or ex partner: Not on file     Emotionally abused: Not on file     Physically abused: Not on file     Forced sexual activity: Not on file   Other Topics Concern    Not on file   Social History Narrative    Not on file     Family History   Problem Relation Age of Onset    Heart disease Mother     Heart disease Father        ROS:  Please see HPI for positive symptoms  Patient is not reliable with answers  Objective:  /80 (BP Location: Right arm)   Pulse 68   Temp 99 8 °F (37 7 °C) (Tympanic)   Resp 16   Ht 4' 11" (1 499 m)   Wt 61 6 kg (135 lb 12 9 oz)   LMP 05/29/1998   SpO2 97%   BMI 27 43 kg/m²     General: alert   Mental status: oriented x1  Attention: normal  Knowledge: fair  Language and Speech: receptive aphasia  Cranial nerves: II-XII intact except left upper quadrant visual field defect   Muscle tone: normal  Motor strength:  5/5 on right side, LUE: 4/5, LLE: 4+/5  Sensory: normal to light touch, pin prick, vibration   Proprioception intact  Gait: unsteady  Coordination: finger to nose and heel to toe normal  Reflexes: 2+ throughout  except as noted      Labs:      Lab Results   Component Value Date    WBC 8 10 11/13/2019    HGB 14 2 11/13/2019    HCT 46 1 11/13/2019     (H) 11/13/2019     11/13/2019     Lab Results   Component Value Date    HGBA1C 6 5 (H) 11/11/2019     Lab Results   Component Value Date    ALT 22 11/13/2019    AST 25 11/13/2019    GGT 58 11/11/2019    ALKPHOS 99 11/13/2019     Lab Results   Component Value Date    CALCIUM 8 2 (L) 11/13/2019    K 3 9 11/13/2019    CO2 22 11/13/2019     11/13/2019    BUN 8 11/13/2019    CREATININE 0 78 11/13/2019         Review of reports and notes reveal:       Cta Head And Neck W Wo Contrast    Result Date: 11/12/2019  Subtle area of high density in the undersurface of the posterior right temporal lobe  Minor reactive prominence of blood vessels in this region  See contemporary MR  No large vessel flow restrictive disease within the head or neck despite diffuse, minor atherosclerotic changes    Workstation performed: [id number]     Xr Chest 1 View Portable    Result Date: 11/11/2019  No acute cardiopulmonary disease  Workstation performed: [id number]     Ct Head Without Contrast    Result Date: 11/11/2019  No acute intracranial abnormality  Microangiopathic changes  Workstation performed: [id number]     Mri Brain W Wo Contrast    Result Date: 11/12/2019  Cortical thickening in the undersurface of the right posterior temporal lobe extending to the occipital lobe  No vasogenic edema  Minor parenchymal enhancement and minor focal diffusion restriction  Concern for neoplasm, to include lymphoma  Lumbar puncture is suggested  The study was marked in [facility] for immediate notification  Workstation performed: [id number]     Ct Chest Abdomen Pelvis W Contrast    Result Date: 11/13/2019  There are no findings that carry high suspicion for malignancy in the chest, abdomen, and pelvis   There is a 5 mm groundglass density right upper lobe pulmonary nodule (series 2 image 22 ) Based on current Fleischner Society 2017 Guidelines on incidental pulmonary nodule, patients with a known malignancy are at increased risk of metastasis and should  receive initial three month follow-up chest CT  Workstation performed: [id number]     Us Right Upper Quadrant    Result Date: 11/12/2019  Cholelithiasis without evidence of acute cholecystitis  Workstation performed: [id number]             Thank you for this consult      Total time of encounter:  40 min  More than 50% of the time was used in counseling and/or coordination of care  Extent of couseling and/or coordination of care        MD Lexx Freeman [facility]  [address]  [address]  [phone]

## 2019-11-13 NOTE — CONSULTS
Liborio Colvin [facility]  1945    HEMATOLOGY/ONCOLOGY CONSULTATION REPORT  [facility]    DISCUSSION/SUMMARY:    68-year-old female with history of left-sided breast cancer (treated elsewhere) admitted with mental status changes  MRI/brain results are listed below  Radiologist stated in the impression that there was parenchymal enhancement concerning for malignancy, possibly lymphoma  I have placed a call into neurosurgery (Dr Carley Anderson) in [place] - a callback is pending  The obvious question is if the CNS lesion can be biopsied  It would be important to determine whether this lesion is metastatic breast cancer verses a primary CNS lymphoma versus other (as treatments would be significantly different)  Patient has been seen by Neurology  Part of the workup includes an LP (pending)  Patient is on Keppra and Lovenox prophylaxis  Aspirin has been discontinued  Unless absolutely necessary, I would try to keep this patient off of any steroids as they may affect the histology (if lymphoma)  CT scan of the chest and abdomen pelvis would also be helpful - rule out evidence of metastatic disease elsewhere (which would be more consistent with the history of breast cancer)  Will follow with you  Please do not hesitate to contact me if you have any questions or need additional information  Thank you for this consult     _________________________________________________________________________________    Chief Complaint   Patient presents with    Weakness - Generalized    Diarrhea    Headache     sx started this am     History of Present Illness:  68-year-old female with a history of breast cancer presents to the emergency room with altered mental status without clear etiology  Patient was admitted for workup and treatment  MRI/brain demonstrated cortical thickening in the right posterior temporal lobe    Patient has been seen by Neurology; working diagnosis includes a CNS malignancy, possibly lymphoma  Mrs Kenny was found in bed in no apparent distress  Patient is presently undergoing EEG, limited movements, limited communication  No evidence of pain  Review of Systems   Constitutional: Negative  HENT: Negative  Eyes: Negative  Respiratory: Negative  Cardiovascular: Negative  Gastrointestinal: Negative  Endocrine: Negative  Genitourinary: Negative  Musculoskeletal: Negative  Skin: Negative  Allergic/Immunologic: Negative  Neurological: Negative  Hematological: Negative  Psychiatric/Behavioral: Negative  All other systems reviewed and are negative      Patient Active Problem List   Diagnosis    ERRONEOUS ENCOUNTER--DISREGARD    Medicare annual wellness visit, subsequent    Urinary incontinence    Encounter for screening mammogram for breast cancer    Encounter for smoking cessation counseling    Advanced directives, counseling/discussion    Controlled type 2 diabetes mellitus without complication, without long-term current use of insulin ([facility] )    Essential hypertension    Disorientation    Hypertensive urgency    Total bilirubin, elevated    Visual-spatial impairment     Past Medical History:   Diagnosis Date    Bacterial pneumonia     Last Assessed: 11/14/2013    Breast cancer ([facility] )     hx    Diabetes mellitus ([facility] )     Hyperlipidemia     Hypertension      Past Surgical History:   Procedure Laterality Date    COMPLETE MASTECTOMY W/ SENTINEL NODE BIOPSY Left     Resolved: 6/19/2007     Family History   Problem Relation Age of Onset    Heart disease Mother     Heart disease Father      Social History     Socioeconomic History    Marital status: Single     Spouse name: Not on file    Number of children: Not on file    Years of education: Not on file    Highest education level: Not on file   Occupational History    Not on file   Social Needs    Financial resource strain: Not on file    Food insecurity:     Worry: Not on file Inability: Not on file    Transportation needs:     Medical: Not on file     Non-medical: Not on file   Tobacco Use    Smoking status: Current Every Day Smoker     Packs/day: 1 00    Smokeless tobacco: Current User   Substance and Sexual Activity    Alcohol use: Not Currently    Drug use: Not Currently    Sexual activity: Not Currently   Lifestyle    Physical activity:     Days per week: Not on file     Minutes per session: Not on file    Stress: Not on file   Relationships    Social connections:     Talks on phone: Not on file     Gets together: Not on file     Attends [employer] service: Not on file     Active member of club or organization: Not on file     Attends meetings of clubs or organizations: Not on file     Relationship status: Not on file    Intimate partner violence:     Fear of current or ex partner: Not on file     Emotionally abused: Not on file     Physically abused: Not on file     Forced sexual activity: Not on file   Other Topics Concern    Not on file   Social History Narrative    Not on file       Current Facility-Administered Medications:     acetaminophen (TYLENOL) tablet 650 mg, 650 mg, Oral, Q6H PRN, Victoria Alvarado MD, 650 mg at 11/13/19 0734    atorvastatin (LIPITOR) tablet 40 mg, 40 mg, Oral, Daily With Dinner, Grover Mcintyre MD, 40 mg at 11/12/19 1705    calcium carbonate (TUMS) chewable tablet 1,000 mg, 1,000 mg, Oral, Daily PRN, Victoria Alvarado MD    docusate sodium (COLACE) capsule 100 mg, 100 mg, Oral, BID, Victoria Alvarado MD, 100 mg at 11/12/19 1705    enoxaparin (LOVENOX) subcutaneous injection 40 mg, 40 mg, Subcutaneous, Daily, Victoria Alvarado MD, 40 mg at 11/12/19 0943    hydrALAZINE (APRESOLINE) injection 5 mg, 5 mg, Intravenous, Q4H PRN, Marylee Holly, MD, 5 mg at 11/13/19 0427    hydrocortisone 2 5 % ointment, , Topical, BID, Victoria Alvarado MD    insulin lispro (HumaLOG) 100 units/mL subcutaneous injection 1-5 Units, 1-5 Units, Subcutaneous, 4x Daily (AC & HS), 1 Units at 11/12/19 1706 **AND** Fingerstick Glucose (POCT), , , TID AC, Deonte Orozco MD    iohexol (OMNIPAQUE) 240 MG/ML solution 50 mL, 50 mL, Oral, 90 min pre-procedure, Wendy Duarte MD    ipratropium-albuterol (DUO-NEB) 0 5-2 5 mg/3 mL inhalation solution 3 mL, 3 mL, Nebulization, Q6H PRN, Deonte Orozco MD    lisinopril (ZESTRIL) tablet 40 mg, 40 mg, Oral, Daily, Betty Quick MD    ondansetron [facility]) injection 4 mg, 4 mg, Intravenous, Q6H PRN, Deonte Orozco MD    [facility]) tablet 8 6 mg, 1 tablet, Oral, HS PRN, Deonte Orozco MD    sodium chloride 0 9 % infusion, 100 mL/hr, Intravenous, Continuous, Deonte Orozco MD, Last Rate: 100 mL/hr at 11/13/19 0524, 100 mL/hr at 11/13/19 0524    Allergies   Allergen Reactions    Percocet [Oxycodone-Acetaminophen]      Patient tolerated Tylenol in the past in 2016       Vitals:    11/13/19 0734   BP: 164/72   Pulse: (!) 49   Resp: 16   Temp: 100 5 °F (38 1 °C)   SpO2: 95%     Physical Exam   Constitutional: She is oriented to person, place, and time  She appears well-developed and well-nourished  Well-nourished female, no respiratory distress   HENT:   Head: Normocephalic and atraumatic  Right Ear: External ear normal    Left Ear: External ear normal    Mouth/Throat: Oropharynx is clear and moist    Eyes: Pupils are equal, round, and reactive to light  Conjunctivae and EOM are normal    Neck: Normal range of motion  Neck supple  Cardiovascular: Normal rate, regular rhythm, normal heart sounds and intact distal pulses  Pulmonary/Chest: Effort normal and breath sounds normal    Abdominal: Soft  Bowel sounds are normal    Soft, nontender, obese, cannot palpate liver or spleen   Musculoskeletal: Normal range of motion  Neurological: She is alert and oriented to person, place, and time  She has normal reflexes  Skin: Skin is warm     Relatively good color, warm, moist, no petechiae or ecchymoses   Psychiatric: She has a normal mood and affect  Her behavior is normal  Judgment and thought content normal    Breasts:  Deferred  Extremities:  1+ bilateral lower extremity edema, no cords, pulses are 1+  Lymphatics:  Deferred    Labs    11/13/2019 WBC = 8 1 hemoglobin = 14 2 hematocrit = 46 1 MCV = 100 platelet = 311 neutrophil = 50% lymphocytes = 20% monocyte = 13% BUN = 8 creatinine = 0 78 calcium = 8 2 AST = 25 ALT = 22 alkaline phosphatase = 99 total protein = 6 6 total bilirubin = 1 40    Imaging    11/12/2019 MRI brain    Cortical thickening in the undersurface of the right posterior temporal lobe extending to the occipital lobe  No vasogenic edema  Minor parenchymal enhancement and minor focal diffusion restriction  Concern for neoplasm, to include lymphoma  Lumbar   puncture is suggested

## 2019-11-13 NOTE — ASSESSMENT & PLAN NOTE
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

## 2019-11-14 PROBLEM — Z00.00 MEDICARE ANNUAL WELLNESS VISIT, SUBSEQUENT: Status: RESOLVED | Noted: 2018-10-29 | Resolved: 2019-01-01

## 2019-11-14 PROBLEM — R41.842 VISUAL-SPATIAL IMPAIRMENT: Status: ACTIVE | Noted: 2019-01-01

## 2019-11-14 PROBLEM — R56.9 SEIZURES (HCC): Status: ACTIVE | Noted: 2019-01-01

## 2019-11-14 PROBLEM — Z12.31 ENCOUNTER FOR SCREENING MAMMOGRAM FOR BREAST CANCER: Status: RESOLVED | Noted: 2018-10-29 | Resolved: 2019-01-01

## 2019-11-14 PROBLEM — Z71.6 ENCOUNTER FOR SMOKING CESSATION COUNSELING: Status: RESOLVED | Noted: 2018-10-29 | Resolved: 2019-01-01

## 2019-11-14 PROBLEM — Z71.89 ADVANCED DIRECTIVES, COUNSELING/DISCUSSION: Status: RESOLVED | Noted: 2018-10-29 | Resolved: 2019-01-01

## 2019-11-14 NOTE — QUICK NOTE
Called to patient's room by nurse  Patient's vitals were stable, except SpO2 dropping to 88%  Patient was placed on 2L NC and SpO2 increased to 94%  On exam, patient was awake, however not alert or oriented  Patient was very slow to respond to verbal commands and had sluggish conscriction of pupils bilaterally  Patient was also unable to move lower extremities bilaterally, however able to move both upper extremities  Patient was also noted to have urine and bowel incontinence  Patient appeared post ictal initially, however reevaluated patient after 10 minutes and patient was awake and more alert  She was able to follow commands and move all extremities  Patient mental status returned to baseline  Prior to leaving room, weaned patient off nasal cannula and SpO2 remained stable at 92%  Will continue to monitor with neuro checks q4h and seizure precautions

## 2019-11-14 NOTE — PROGRESS NOTES
Pt transferred to [address] [place] [address] to [facility] room 714 via [facility] , report given to Hardik Saini

## 2019-11-14 NOTE — PHYSICAL THERAPY NOTE
Daniel deferred this AM bu nursing due to medical status  kristie continue to follow as medically appropriate   Neri Lehman PT, DPT CSRS

## 2019-11-14 NOTE — ASSESSMENT & PLAN NOTE
- Likely secondary to CNS mass as above, abnormal EEG concerning for periodic electrographic seizures noted prior to transfer and Keppra was started, transferred for extended EMU monitoring   - EMU order/consult placed and case discussed with epileptologist   - Continue IV Keppra 1,000 mg BID   - Seizure and aspiration precautions

## 2019-11-14 NOTE — ASSESSMENT & PLAN NOTE
- Noted to be lethargic with change in mental status, not getting out of bed which prompted ED evaluation and subsequent admission  - Likely encephalopathy secondary to above versus intermittent post-ictal status  - On arrival, patient's neurologic exam and mental status appears to be worse than documented exams today at [facility]- new urinary and bowel incontinence, pupils reactive bilaterally but sluggish, only able to state name and follow simple command, somnolent  - Concern for acute bleed versus active seizure/post-ictal   - Case discussed with critical care, recommended:   - STAT CT head   - ABG    - Plan for EMU monitoring as above   - Will continue to monitor neurologic and mental status closely    - Given mental status changes, NPO until mental status improves/AM speech and swallow eval   -  mL/hr

## 2019-11-14 NOTE — H&P
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 considering history of breast cancer   - 11/11/19 CT Head- "No acute intracranial abnormality  Microangiopathic changes  "  - 11/12/19 CTA Head/Neck- "Subtle area of high density in the undersurface of the posterior right temporal lobe  Minor reactive prominence of blood vessels in this region  See contemporary MR  No large vessel flow restrictive disease within the head or neck despite diffuse, minor atherosclerotic changes  "  - 11/12/19 MRI brain- "Cortical thickening in the undersurface of the right posterior temporal lobe extending to the occipital lobe  No vasogenic edema  Minor parenchymal enhancement and minor focal diffusion restriction  Concern for neoplasm, to include lymphoma  Lumbar   puncture is suggested  "  - CT chest abdomen pelvis negative for source of malignancy  - Plan for IR guided lumbar puncture for cytology, leukemia/lymphoma cytometry, CSF studies, HSV PCR   - Transferred to [facility] for neurosurgical evaluation, possible biopsy of mass   - Per neurology and oncology, avoid steroids unless absolutely necessary   - Neuro checks every 4 hours     Seizures ([address] )  Assessment & Plan  - Likely secondary to CNS mass as above, abnormal EEG concerning for periodic electrographic seizures noted prior to transfer and Berl Purple was started, transferred for extended EMU monitoring   - EMU order/consult placed and case discussed with epileptologist   - Continue IV Keppra 1,000 mg BID   - Seizure and aspiration precautions     Visual-spatial impairment  Assessment & Plan  - Unable to assess due to current mental status, noted on exam PTA, plan as above     Altered mental status  Assessment & Plan  - Noted to be lethargic with change in mental status, not getting out of bed which prompted ED evaluation and subsequent admission  - Likely encephalopathy secondary to above versus intermittent post-ictal status  - On arrival, patient's neurologic exam and mental status appears to be worse than documented exams today at [facility]- new urinary and bowel incontinence, pupils reactive bilaterally but sluggish, only able to state name and follow simple command, somnolent  - Concern for acute bleed versus active seizure/post-ictal   - Case discussed with critical care, recommended:   - STAT CT head   - ABG    - Plan for EMU monitoring as above   - Will continue to monitor neurologic and mental status closely    - Given mental status changes, NPO until mental status improves/AM speech and swallow eval   -  mL/hr    Essential hypertension  Assessment & Plan  - Blood pressure well-controlled, per neurology plan for normotension   - Continue lisinopril 40 mg daily     Controlled type 2 diabetes mellitus without complication, without long-term current use of insulin [facility])  Assessment & Plan  Lab Results   Component Value Date    HGBA1C 6 5 (H) 11/11/2019       Recent Labs     11/13/19  1124 11/13/19  1642 11/13/19  2029 11/13/19  2351   POCGLU 169* 80 101 111       Blood Sugar Average: Last 72 hrs:  (P) 111     - Continue SSI #2       DVT Prophylaxis- Hold chemoprophylaxis for now (consider restarting if no hemorrhage on CT and cleared by neurosurgery), SCDs  Diet- NPO   Code Status- Level 1 Full Code   Disposition- Anticipate greater than 2 midnight stay     Discussed with attending physician, Dr Shashank Yen, who agrees with the assessment and plan       SUBJECTIVE  HPI:  69 y o  female with past medical history significant for history of breast cancer, hypertension and non-insulin dependent type 2 DM presenting as a transfer from [address] [place] [address] Nanette Carty for neurosurgical evaluation and EMU monitoring  She initially presented for altered mental status and confusion, and after further work-up a new lesion was discovered on MRI brain, as well as EEG findings concerning for electrographic seizures  She was being followed by neurology and oncology at [facility], who recommended transfer for possible biopsy of brain neoplasm  On arrival, the patient is very somnolent as outlined below in physical exam, and history was not able to be obtained directly from patient, history from chart review and sign out  Per nursing, patient is now incontinent of stool and urine  Review of Systems   Unable to perform ROS: Mental status change         Historical Information   Past Medical History:   Diagnosis Date    Bacterial pneumonia     Last Assessed: 11/14/2013    Breast cancer (HCC)     hx    Diabetes mellitus ([facility] )     Hyperlipidemia     Hypertension      Past Surgical History:   Procedure Laterality Date    COMPLETE MASTECTOMY W/ SENTINEL NODE BIOPSY Left     Resolved: 6/19/2007     Social History   Social History     Substance and Sexual Activity   Alcohol Use Not Currently     Social History     Substance and Sexual Activity   Drug Use Not Currently     Social History     Tobacco Use   Smoking Status Current Every Day Smoker    Packs/day: 1 00   Smokeless Tobacco Current User     Family History:   Family History   Problem Relation Age of Onset    Heart disease Mother     Heart disease Father        Medications:  Meds/Allergies   PTA meds:   Prior to Admission Medications   Prescriptions Last Dose Informant Patient Reported? Taking? albuterol (PROVENTIL HFA,VENTOLIN HFA) 90 mcg/act inhaler   No No   Sig: Inhale 1-2 puffs every 6 (six) hours as needed for wheezing   Patient not taking: Reported on 6/6/2019   aspirin 81 MG tablet   Yes No   Sig: Take 81 mg by mouth daily     atorvastatin (LIPITOR) 80 mg tablet   No No   Sig: Take 1 tablet (80 mg total) by mouth daily   hydrocortisone 2 5 % ointment   No No   Sig: Apply topically 2 (two) times a day   ipratropium-albuterol (COMBIVENT RESPIMAT) inhaler   No No   Sig: Inhale 1 puff 2 (two) times a day   Patient not taking: Reported on 10/29/2018    lisinopril (ZESTRIL) 20 mg tablet   No No   Sig: Take 1 tablet (20 mg total) by mouth daily   metFORMIN (GLUCOPHAGE) 500 mg tablet   No No   Sig: Take 1 tablet (500 mg total) by mouth daily with breakfast   nitrofurantoin (MACROBID) 100 mg capsule   No No   Sig: Take 1 capsule (100 mg total) by mouth 2 (two) times a day   Patient not taking: Reported on 11/11/2019   ondansetron (ZOFRAN) 4 mg tablet   No No   Sig: Take 1 tablet (4 mg total) by mouth every 6 (six) hours      Facility-Administered Medications: None     Allergies   Allergen Reactions    Percocet [Oxycodone-Acetaminophen]      Patient tolerated Tylenol in the past in 2016       OBJECTIVE  Vitals:   Vitals:    11/13/19 2231 11/14/19 0037 11/14/19 0038 11/14/19 0140   BP:  96/55 124/54    Pulse:  (!) 53 (!) 54    Resp:       Temp:  98 7 °F (37 1 °C) 98 7 °F (37 1 °C)    TempSrc:       SpO2:  95% 93% 96%   Weight: 61 6 kg (135 lb 12 9 oz)      Height: 4' 11" (1 499 m)            Intake/Output Summary (Last 24 hours) at 11/14/2019 0408  Last data filed at 11/14/2019 0317  Gross per 24 hour   Intake 323 33 ml   Output 450 ml   Net -126 67 ml       Invasive Devices     Peripheral Intravenous Line            Peripheral IV 11/11/19 Right Antecubital 2 days    Peripheral IV 11/12/19 Distal;Dorsal (posterior); Right Forearm 1 day                Physical Exam   Constitutional: She appears well-developed and well-nourished  No distress  HENT:   Head: Normocephalic and atraumatic  Eyes: Conjunctivae are normal  Right eye exhibits no discharge  Left eye exhibits no discharge  Pupils equally reactive to light, sluggish   Neck: Neck supple     Cardiovascular: Normal rate, regular rhythm, normal heart sounds and intact distal pulses  No murmur heard  Pulmonary/Chest: Effort normal and breath sounds normal  No stridor  No respiratory distress  She has no wheezes  She has no rales  Diminished at bases bilaterally    Abdominal: Soft  Bowel sounds are normal  She exhibits no distension  There is tenderness  There is no rebound and no guarding  Slight facial grimace with palpation of abdomen, soft without rebound or guarding    Musculoskeletal: She exhibits no edema  Neurological: She exhibits normal muscle tone  GCS eye subscore is 3  GCS verbal subscore is 4  GCS motor subscore is 6  Somnolent, opens eyes to voice but quickly closes again, is able to state name, incorrect year, will squeeze my fingers with good strength and wiggle toes, otherwise not participating in neurologic exam      Skin: Skin is warm  Capillary refill takes less than 2 seconds  No rash noted  She is not diaphoretic  Psychiatric:   Unable to assess    Vitals reviewed  Lab:  I have personally reviewed all pertinent results  Admission on 11/13/2019   Component Date Value Ref Range Status    POC Glucose 11/13/2019 111  65 - 140 mg/dl Final     Results from last 7 days   Lab Units 11/13/19  0620 11/12/19  0459 11/11/19  0950   POTASSIUM mmol/L 3 9 4 1 5 6*   CHLORIDE mmol/L 104 103 101   CO2 mmol/L 22 23 27   BUN mg/dL 8 15 17   CREATININE mg/dL 0 78 0 86 0 97   EGFR ml/min/1 73sq m 76 68 59   CALCIUM mg/dL 8 2* 8 4 9 2   AST U/L 25 22 44   ALT U/L 22 22 32   ALK PHOS U/L 99 101 126*     Results from last 7 days   Lab Units 11/13/19  0620 11/12/19  0459 11/11/19  0950   WBC Thousand/uL 8 10 9 39 10 28*   HEMOGLOBIN g/dL 14 2 13 8 16 4*   PLATELETS Thousands/uL 174 168 190     Results from last 7 days   Lab Units 11/11/19  1656 11/11/19  0957 11/11/19  0950   BLOOD CULTURE   --  No Growth at 48 hrs  No Growth at 48 hrs     URINE CULTURE  No Growth <1000 cfu/mL  --   --    MRSA CULTURE ONLY  No Methicillin Resistant Staphlyococcus aureus (MRSA) isolated --   --        Imaging:  I have personally reviewed all pertinent results  EKG, Pathology, and Other Studies:   I have personally reviewed all pertinent results          Quang Norris DO, PGY-3  [facility]   11/14/2019

## 2019-11-14 NOTE — ASSESSMENT & PLAN NOTE
Presented with AMS in the setting of known breast cancer    Imaging reviewed personally and with attending, results are as follows:  · MRI brain w wo contrast 11/14/2019:  Cortical thickening in the undersurface of the right posterior temporal lobe extending to the occipital lobe  No vasogenic edema  Minor parenchymal enhancement and minor focal diffusion restriction  Concern for neoplasm, to include lymphoma  · CT chest abdomen pelvis 11/13/2019:  No findings that carry high suspicion for malignancy  Plan:  · Repeat CT head STAT if GCS declines more than 2 points in one hour  · Medical oncology following, appreciate input, recommend against steroids in the event this is lymphoma, would like biopsy to confirm pathology  · Agree with recommendations, would advise against steroid treatment at this juncture  · Medical management and pain control per primary team  · DVT ppx:  benjy Heath for pharm DVT ppx from [address] [place] [address] standpoint  · Mobilize as tolerated with assistance, PT / OT evaluation    Neurosurgery will continue to follow  Ongoing discussion with NSX team regarding need for bx and timing of procedure  Please call with questions or concerns

## 2019-11-14 NOTE — ASSESSMENT & PLAN NOTE
Lab Results   Component Value Date    HGBA1C 6 5 (H) 11/11/2019       Recent Labs     11/13/19  1124 11/13/19  1642 11/13/19 2029 11/13/19  2351   POCGLU 169* 80 101 111       Blood Sugar Average: Last 72 hrs:  (P) 111     - Continue SSI #2

## 2019-11-14 NOTE — EMTALA/ACUTE CARE TRANSFER
[facility] [address]  Dept: [phone]      ACUTE CARE TRANSFER CONSENT    NAME Liborio Kenny                                         1945                              MRN [id number]    I have been informed of my rights regarding examination, treatment, and transfer   by Dr Ashok Lin MD    Benefits: Specialized equipment and/or services available at the receiving facility (Include comment)________________________(longer EEG monitoring, neurosurgery consult)    Risks: Potential for delay in receiving treatment, Potential deterioration of medical condition, Increased discomfort during transfer      Consent for Transfer:  I acknowledge that my medical condition has been evaluated and explained to me by the treating physician or other qualified medical person and/or my attending physician, who has recommended that I be transferred to the service of  Accepting Physician: Dr Katie Rose at [address] [facility] [address], [address] : [place], [place]  The above potential benefits of such transfer, the potential risks associated with such transfer, and the probable risks of not being transferred have been explained to me, and I fully understand them  The doctor has explained that, in my case, the benefits of transfer outweigh the risks  I agree to be transferred  I authorize the performance of emergency medical procedures and treatments upon me in both transit and upon arrival at the receiving facility  Additionally, I authorize the release of any and all medical records to the receiving facility and request they be transported with me, if possible  I understand that the safest mode of transportation during a medical emergency is an ambulance and that the Hospital advocates the use of this mode of transport   Risks of traveling to the receiving facility by car, including absence of medical control, life sustaining equipment, such as oxygen, and medical personnel has been explained to me and I fully understand them  (SARITA CORRECT BOX BELOW)  [  ]  I consent to the stated transfer and to be transported by ambulance/helicopter  [  ]  I consent to the stated transfer, but refuse transportation by ambulance and accept full responsibility for my transportation by car  I understand the risks of non-ambulance transfers and I exonerate the Hospital and its staff from any deterioration in my condition that results from this refusal     X___________________________________________    DATE  19  TIME________  Signature of patient or legally responsible individual signing on patient behalf           RELATIONSHIP TO PATIENT_________________________          Provider Certification    NAME Neftaly Cochran                                         1945                              MRN [id number]    A medical screening exam was performed on the above named patient    Based on the examination:    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 necessitating longer EEG monitoring and neurosurgery consult    Patient Condition: The patient has been stabilized such that within reasonable medical probability, no material deterioration of the patient condition or the condition of the unborn child(zoey) is likely to result from the transfer    Reason for Transfer: Level of Care needed not available at this facility    Transfer Requirements: Sylvia Ceja [address] [place] [address]   · Space available and qualified personnel available for treatment as acknowledged by    · Agreed to accept transfer and to provide appropriate medical treatment as acknowledged by       Dr Anastacio Lopez  · Appropriate medical records of the examination and treatment of the patient are provided at the time of transfer   [address] _______  · Transfer will be performed by qualified personnel from    and appropriate transfer equipment as required, including the use of necessary and appropriate life support measures  Provider Certification: I have examined the patient and explained the following risks and benefits of being transferred/refusing transfer to the patient/family:  General risk, such as traffic hazards, adverse weather conditions, rough terrain or turbulence, possible failure of equipment (including vehicle or aircraft), or consequences of actions of persons outside the control of the transport personnel, Unanticipated needs of medical equipment and personnel during transport, Risk of worsening condition, The possibility of a transport vehicle being unavailable      Based on these reasonable risks and benefits to the patient and/or the unborn child(zoey), and based upon the information available at the time of the patients examination, I certify that the medical benefits reasonably to be expected from the provision of appropriate medical treatments at another medical facility outweigh the increasing risks, if any, to the individuals medical condition, and in the case of labor to the unborn child, from effecting the transfer      X____________________________________________ DATE 11/13/19        TIME_______      ORIGINAL - SEND TO MEDICAL RECORDS   COPY - SEND WITH PATIENT DURING TRANSFER

## 2019-11-14 NOTE — ASSESSMENT & PLAN NOTE
- Blood pressure well-controlled, per neurology plan for normotension   - Continue lisinopril 40 mg daily

## 2019-11-14 NOTE — PLAN OF CARE
Problem: Potential for Falls  Goal: Patient will remain free of falls  Description  INTERVENTIONS:  - Assess patient frequently for physical needs  -  Identify cognitive and physical deficits and behaviors that affect risk of falls    -  [place] fall precautions as indicated by assessment   - Educate patient/family on patient safety including physical limitations  - Instruct patient to call for assistance with activity based on assessment  - Modify environment to reduce risk of injury  - Consider OT/PT consult to assist with strengthening/mobility  Outcome: Progressing     Problem: Prexisting or High Potential for Compromised Skin Integrity  Goal: Skin integrity is maintained or improved  Description  INTERVENTIONS:  - Identify patients at risk for skin breakdown  - Assess and monitor skin integrity  - Assess and monitor nutrition and hydration status  - Monitor labs   - Assess for incontinence   - Turn and reposition patient  - Assist with mobility/ambulation  - Relieve pressure over bony prominences  - Avoid friction and shearing  - Provide appropriate hygiene as needed including keeping skin clean and dry  - Evaluate need for skin moisturizer/barrier cream  - Collaborate with interdisciplinary team   - Patient/family teaching  - Consider wound care consult   Outcome: Progressing     Problem: PAIN - ADULT  Goal: Verbalizes/displays adequate comfort level or baseline comfort level  Description  Interventions:  - Encourage patient to monitor pain and request assistance  - Assess pain using appropriate pain scale  - Administer analgesics based on type and severity of pain and evaluate response  - Implement non-pharmacological measures as appropriate and evaluate response  - Consider cultural and social influences on pain and pain management  - Notify physician/advanced practitioner if interventions unsuccessful or patient reports new pain  Outcome: Progressing     Problem: INFECTION - ADULT  Goal: Absence or prevention of progression during hospitalization  Description  INTERVENTIONS:  - Assess and monitor for signs and symptoms of infection  - Monitor lab/diagnostic results  - Monitor all insertion sites, i e  indwelling lines, tubes, and drains  - Monitor endotracheal if appropriate and nasal secretions for changes in amount and color  - [place] appropriate cooling/warming therapies per order  - Administer medications as ordered  - Instruct and encourage patient and family to use good hand hygiene technique  - Identify and instruct in appropriate isolation precautions for identified infection/condition  Outcome: Progressing  Goal: Absence of fever/infection during neutropenic period  Description  INTERVENTIONS:  - Monitor WBC    Outcome: Progressing     Problem: SAFETY ADULT  Goal: Maintain or return to baseline ADL function  Description  INTERVENTIONS:  -  Assess patient's ability to carry out ADLs; assess patient's baseline for ADL function and identify physical deficits which impact ability to perform ADLs (bathing, care of mouth/teeth, toileting, grooming, dressing, etc )  - Assess/evaluate cause of self-care deficits   - Assess range of motion  - Assess patient's mobility; develop plan if impaired  - Assess patient's need for assistive devices and provide as appropriate  - Encourage maximum independence but intervene and supervise when necessary  - Involve family in performance of ADLs  - Assess for home care needs following discharge   - Consider OT consult to assist with ADL evaluation and planning for discharge  - Provide patient education as appropriate  Outcome: Progressing  Goal: Maintain or return mobility status to optimal level  Description  INTERVENTIONS:  - Assess patient's baseline mobility status (ambulation, transfers, stairs, etc )    - Identify cognitive and physical deficits and behaviors that affect mobility  - Identify mobility aids required to assist with transfers and/or ambulation (gait belt, sit-to-stand, lift, walker, cane, etc )  - [place] fall precautions as indicated by assessment  - Record patient progress and toleration of activity level on Mobility SBAR; progress patient to next Phase/Stage  - Instruct patient to call for assistance with activity based on assessment  - Consider rehabilitation consult to assist with strengthening/weightbearing, etc   Outcome: Progressing     Problem: DISCHARGE PLANNING  Goal: Discharge to home or other facility with appropriate resources  Description  INTERVENTIONS:  - Identify barriers to discharge w/patient and caregiver  - Arrange for needed discharge resources and transportation as appropriate  - Identify discharge learning needs (meds, wound care, etc )  - Arrange for interpretive services to assist at discharge as needed  - Refer to Case Management Department for coordinating discharge planning if the patient needs post-hospital services based on physician/advanced practitioner order or complex needs related to functional status, cognitive ability, or social support system  Outcome: Progressing     Problem: Knowledge Deficit  Goal: Patient/family/caregiver demonstrates understanding of disease process, treatment plan, medications, and discharge instructions  Description  Complete learning assessment and assess knowledge base    Interventions:  - Provide teaching at level of understanding  - Provide teaching via preferred learning methods  Outcome: Progressing     Problem: NEUROSENSORY - ADULT  Goal: Achieves stable or improved neurological status  Description  INTERVENTIONS  - Monitor and report changes in neurological status  - Monitor vital signs such as temperature, blood pressure, glucose, and any other labs ordered   - Initiate measures to prevent increased intracranial pressure  - Monitor for seizure activity and implement precautions if appropriate      Outcome: Progressing  Goal: Remains free of injury related to seizures activity  Description  INTERVENTIONS  - Maintain airway, patient safety  and administer oxygen as ordered  - Monitor patient for seizure activity, document and report duration and description of seizure to physician/advanced practitioner  - If seizure occurs,  ensure patient safety during seizure  - Reorient patient post seizure  - Seizure pads on all 4 side rails  - Instruct patient/family to notify RN of any seizure activity including if an aura is experienced  - Instruct patient/family to call for assistance with activity based on nursing assessment  - Administer anti-seizure medications if ordered    Outcome: Progressing  Goal: Achieves maximal functionality and self care  Description  INTERVENTIONS  - Monitor swallowing and airway patency with patient fatigue and changes in neurological status  - Encourage and assist patient to increase activity and self care     - Encourage visually impaired, hearing impaired and aphasic patients to use assistive/communication devices  Outcome: Progressing     Problem: MUSCULOSKELETAL - ADULT  Goal: Maintain or return mobility to safest level of function  Description  INTERVENTIONS:  - Assess patient's ability to carry out ADLs; assess patient's baseline for ADL function and identify physical deficits which impact ability to perform ADLs (bathing, care of mouth/teeth, toileting, grooming, dressing, etc )  - Assess/evaluate cause of self-care deficits   - Assess range of motion  - Assess patient's mobility  - Assess patient's need for assistive devices and provide as appropriate  - Encourage maximum independence but intervene and supervise when necessary  - Involve family in performance of ADLs  - Assess for home care needs following discharge   - Consider OT consult to assist with ADL evaluation and planning for discharge  - Provide patient education as appropriate  Outcome: Progressing  Goal: Maintain proper alignment of affected body part  Description  INTERVENTIONS:  - Support, maintain and protect limb and body alignment  - Provide patient/ family with appropriate education  Outcome: Progressing

## 2019-11-14 NOTE — ASSESSMENT & PLAN NOTE
Lab Results   Component Value Date    HGBA1C 6 5 (H) 11/11/2019       Recent Labs     11/13/19  2351 11/14/19  0612 11/14/19  0618 11/14/19  1059   POCGLU 111 89 97 104       Blood Sugar Average: Last 72 hrs:  (P) 100 25     Medical management per primary team

## 2019-11-14 NOTE — RESPIRATORY THERAPY NOTE
RT Protocol Note  Ava Silverman 69 y o  female MRN: [id number]  [facility] Encounter: [id number]    Assessment    Principal Problem:    CNS mass  Active Problems:    Controlled type 2 diabetes mellitus without complication, without long-term current use of insulin (HCC)    Essential hypertension    Altered mental status    Visual-spatial impairment      Home Pulmonary Medications:  none       Past Medical History:   Diagnosis Date    Bacterial pneumonia     Last Assessed: 11/14/2013    Breast cancer ([place] [id number] )     hx    Diabetes mellitus ([place] [id number] )     Hyperlipidemia     Hypertension      Social History     Socioeconomic History    Marital status: Single     Spouse name: Not on file    Number of children: Not on file    Years of education: Not on file    Highest education level: Not on file   Occupational History    Not on file   Social Needs    Financial resource strain: Not on file    Food insecurity:     Worry: Not on file     Inability: Not on file    Transportation needs:     Medical: Not on file     Non-medical: Not on file   Tobacco Use    Smoking status: Current Every Day Smoker     Packs/day: 1 00    Smokeless tobacco: Current User   Substance and Sexual Activity    Alcohol use: Not Currently    Drug use: Not Currently    Sexual activity: Not Currently   Lifestyle    Physical activity:     Days per week: Not on file     Minutes per session: Not on file    Stress: Not on file   Relationships    Social connections:     Talks on phone: Not on file     Gets together: Not on file     Attends Sabianist service: Not on file     Active member of club or organization: Not on file     Attends meetings of clubs or organizations: Not on file     Relationship status: Not on file    Intimate partner violence:     Fear of current or ex partner: Not on file     Emotionally abused: Not on file     Physically abused: Not on file     Forced sexual activity: Not on file   Other Topics Concern    Not on file   Social History Narrative    Not on file       Subjective    Subjective Data: pt currently in no resp distress  Pt slightly confused, unable to give appropriate answers  BS diminished, SpO2 96% on room air  No resp UDN indicated at this time  ABG drawn  D/C resp protocol and previously ordered UDN tx's    Objective    Physical Exam:   Assessment Type: Assess only  General Appearance: Awake  Respiratory Pattern: Normal  Chest Assessment: Chest expansion symmetrical  Bilateral Breath Sounds: Diminished  O2 Device: room air    Vitals:  Blood pressure 124/54, pulse (!) 54, temperature 98 7 °F (37 1 °C), resp  rate 20, height 4' 11" (1 499 m), weight 61 6 kg (135 lb 12 9 oz), last menstrual period 05/29/1998, SpO2 96 %, not currently breastfeeding  Imaging and other studies: I have personally reviewed pertinent reports  O2 Device: room air     Plan: D/C resp protocol and previous ordered UDN tx's             Resp Comments: pt assessed per resp protocol  Pt presents as transfer from [facility], CNS mass  Pt has no significant resp PMH   Pt takes no home resp meds, + smoking hx

## 2019-11-14 NOTE — CONSULTS
PATIENT NAME: Felecia Hughes  YOB: 1945   MEDICAL RECORD NUMBER: [id number]  Chief Complaint/Reason for Consult: mental status changes, seizures    HPI: Felecia Hughes is a 69 y o  female with history of HTN  Hyperlipidemia, DM, and breast cancer s/p mastectomy 6/19/07 that presents secondary to mental status changes and seizures  Pt is poor historian  Events initially started on 11/11 when son found patient disoriented and lethargic in the morning  Pt had slept in late which is unusual for her  She was taken to [facility]  Initial workup showed right posterior and temporal and occipital cortical thickening, likely a neoplasm  Needed to rule out CNS lymphoma vs breast cancer metastasis  Pt had EEG that was concerning for seizures  She was transferred to [facility] for EMU and started on keppra and for further workup  Currently, pt complains of head pressure/tightness  She has no other complaints  During history and physical, pt initially stopped responding to examiner questions  She then closed her eyes and would not respond, assumed to be sleeping   Examiner left room and returned later to complete physical exam        PAST MEDICAL HISTORY  Past Medical History:   Diagnosis Date    Bacterial pneumonia     Last Assessed: 11/14/2013    Breast cancer (HCC)     hx    Diabetes mellitus ([facility] [address] )     Hyperlipidemia     Hypertension         PAST SURGICAL HISTORY  Past Surgical History:   Procedure Laterality Date    COMPLETE MASTECTOMY W/ SENTINEL NODE BIOPSY Left     Resolved: 6/19/2007        ALLERGIES: Percocet [oxycodone-acetaminophen]     CURRENT MEDICATIONS  Scheduled Meds:  Current Facility-Administered Medications:  acetaminophen 650 mg Oral Q6H PRN Roby Floyd MD    atorvastatin 40 mg Oral Daily With Elijah Ziegler MD    docusate sodium 100 mg Oral BID Roby Floyd MD    hydrocortisone  Topical BID Roby Floyd MD    insulin lispro 1-5 Units Subcutaneous Q6H [address], DO lacosamide (VIMPAT) IVPB 200 mg Intravenous Q12H Zoila Kilts, DO    levETIRAcetam 1,750 mg Intravenous Q12H [address] Zoila Kilts, DO Last Rate: Stopped (11/14/19 0847)   lisinopril 40 mg Oral Daily Corrinne Armour, MD    LORazepam        sodium chloride 100 mL/hr Intravenous Continuous Zoila Kilts, DO Last Rate: 100 mL/hr (11/14/19 0003)     Continuous Infusions:  sodium chloride 100 mL/hr Last Rate: 100 mL/hr (11/14/19 0003)     PRN Meds:   acetaminophen     SOCIAL HISTORY   reports that she has been smoking  She has been smoking about 1 00 pack per day  She uses smokeless tobacco  She reports that she drank alcohol  She reports that she has current or past drug history  FAMILY HISTORY  Family History   Problem Relation Age of Onset    Heart disease Mother     Heart disease Father         REVIEW OF SYSTEMS   Constitutional: Negative for fever, chills, diaphoresis,and appetite change  HEENT: Negative for hearing loss, ear pain, facial swelling, neck pain, neck stiffness, tinnitus and ear discharge  Negative for ocular pain, discharge, redness and itching  Respiratory: Negative for apnea, chest tightness, shortness of breath, wheezing and stridor  Cardiovascular: Negative for chest pain, palpitations and leg swelling  Gastrointestinal: Negative for diarrhea, constipation and abdominal distention  Endocrine: Negative for cold intolerance and heat intolerance  Genitourinary: Negative for dysuria, urgency, frequency, hematuria, flank pain and difficulty urinating  Neurological: Negative for dizziness, seizures, syncope, facial asymmetry, speech difficulty, light-headedness, numbness and headaches  Hematological: Negative for adenopathy  Does not bruise/bleed easily  Psychiatric/Behavioral: Negative for behavioral problems and agitation  Otherwise complete review of systems is negative aside from what is mentioned above and in the HPI       PHYSICAL EXAMINATION  Temp:  [98 2 °F (36 8 °C)-99 8 °F (37 7 °C)] 99 °F (37 2 °C)  HR:  [49-71] 49  Resp:  [16-24] 20  BP: ()/(54-80) 158/68   General Examination: In no apparent distress, well developed and well nourished, and cooperative   HEENT: Normocephalic, Atraumatic  Moist mucus membranes  CVS: Regular rate and rhythm  S1 S2 noted  No audible murmurs  No carotids bruits  Peripheral pulses palpable throughout   Lungs: Clear to auscultation bilaterally  No rales, rhonchi, wheezing  Abdomen: Bowel sounds positive  Non- tender  Non-distended  No organomegaly  Ext: No edema   Psych: Thought content - No VH/AH  No delusions  Though Process - logical    Skin - No rash    Neurological Examination:   Mental Status: The patient was awake, alert, attentive, oriented to person, and place  States year is 2017  Cranial Nerves:   I: smell Not tested   II: left visual field defect, right eye estropia  Pupils equal, round, reactive to light with normal accomodation  Fundus: benign fundus  III,IV,VI: extraocular muscles EOMI, no nystagmus   V: masseter and pterygoid strength full  Sensation in the V1 through V3 distributions intact to pinprick and light touch bilaterally  VII: Face is symmetric with no weakness noted  VIII: Audition intact to finger rub bilaterally  IX/X: Uvula midline  Soft palate elevation symmetric  XI: Trapezius and SCM strength 5/5 B/L  XII: Tongue midline with no atrophy or fasciculations with appropriate movement  Motor Examination:   No pronator drift  Bulk: Normal  No atrophy Tone: Normal  Fasciculations: None        Deltoid Biceps Triceps WE   WF   FF IO     Right        4         4          4         4      4      4   4        Left           4        4          4          4      4     4   4                       IP        Quad   Ham     TA       Gastroc   Right      4            4          4         4                4  Left         4            4         4         4                4       Reflexes: Biceps Brachioradialis Triceps Patella Achilles   Right          2+            2+                  2+        3+       2+           Left            2+             2+                 2+         3+       2+             Clonus: None    Pathological Reflexes:  Hoffmans: negative  Babinsky: positive on left      Coordination: Patient able to perform normal finger-to-nose and heel to shin appropriately  Normal rapid alternating movements  Sensory: Normal sensation to light touch, pin prick and vibratory sensation throughout       Gait:not assessed    Labs:  Recent Results (from the past 24 hour(s))   Fingerstick Glucose (POCT)    Collection Time: 11/13/19  4:42 PM   Result Value Ref Range    POC Glucose 80 65 - 140 mg/dl   Fingerstick Glucose (POCT)    Collection Time: 11/13/19  8:29 PM   Result Value Ref Range    POC Glucose 101 65 - 140 mg/dl   Fingerstick Glucose (POCT)    Collection Time: 11/13/19 11:51 PM   Result Value Ref Range    POC Glucose 111 65 - 140 mg/dl   Blood gas, arterial    Collection Time: 11/14/19  5:06 AM   Result Value Ref Range    pH, Arterial 7 381 7 350 - 7 450    pCO2, Arterial 34 3 (L) 36 0 - 44 0 mm Hg    pO2, Arterial 64 0 (L) 75 0 - 129 0 mm Hg    HCO3, Arterial 19 9 (L) 22 0 - 28 0 mmol/L    Base Excess, Arterial -4 4 mmol/L    O2 Content, Arterial 18 6 (L) 95 0 - 100 0 mL/dL    O2 HGB,Arterial  91 8 (L) 94 0 - 97 0 %    SOURCE RESULTS FROM SPECIMEN COLLECTED 11/14/19 @ 0141    Fingerstick Glucose (POCT)    Collection Time: 11/14/19  6:12 AM   Result Value Ref Range    POC Glucose 89 65 - 140 mg/dl   Fingerstick Glucose (POCT)    Collection Time: 11/14/19  6:18 AM   Result Value Ref Range    POC Glucose 97 65 - 140 mg/dl   CBC and differential    Collection Time: 11/14/19  6:50 AM   Result Value Ref Range    WBC 10 29 (H) 4 31 - 10 16 Thousand/uL    RBC 4 53 3 81 - 5 12 Million/uL    Hemoglobin 14 0 11 5 - 15 4 g/dL    Hematocrit 43 1 34 8 - 46 1 %    MCV 95 82 - 98 fL    MCH 30 9 26 8 - 34 3 pg    MCHC 32 5 31 4 - 37 4 g/dL    RDW 15 1 11 6 - 15 1 %    MPV 10 4 8 9 - 12 7 fL    Platelets 447 979 - 687 Thousands/uL    nRBC 0 /100 WBCs    Neutrophils Relative 65 43 - 75 %    Immat GRANS % 0 0 - 2 %    Lymphocytes Relative 23 14 - 44 %    Monocytes Relative 12 4 - 12 %    Eosinophils Relative 0 0 - 6 %    Basophils Relative 0 0 - 1 %    Neutrophils Absolute 6 64 1 85 - 7 62 Thousands/µL    Immature Grans Absolute 0 04 0 00 - 0 20 Thousand/uL    Lymphocytes Absolute 2 36 0 60 - 4 47 Thousands/µL    Monocytes Absolute 1 20 0 17 - 1 22 Thousand/µL    Eosinophils Absolute 0 02 0 00 - 0 61 Thousand/µL    Basophils Absolute 0 03 0 00 - 0 10 Thousands/µL   Comprehensive metabolic panel    Collection Time: 11/14/19  6:50 AM   Result Value Ref Range    Sodium 138 136 - 145 mmol/L    Potassium 4 0 3 5 - 5 3 mmol/L    Chloride 108 100 - 108 mmol/L    CO2 22 21 - 32 mmol/L    ANION GAP 8 4 - 13 mmol/L    BUN 9 5 - 25 mg/dL    Creatinine 0 79 0 60 - 1 30 mg/dL    Glucose 87 65 - 140 mg/dL    Calcium 8 8 8 3 - 10 1 mg/dL    AST 26 5 - 45 U/L    ALT 24 12 - 78 U/L    Alkaline Phosphatase 94 46 - 116 U/L    Total Protein 6 7 6 4 - 8 2 g/dL    Albumin 3 4 (L) 3 5 - 5 0 g/dL    Total Bilirubin 1 29 (H) 0 20 - 1 00 mg/dL    eGFR 75 ml/min/1 73sq m   Magnesium    Collection Time: 11/14/19  6:50 AM   Result Value Ref Range    Magnesium 2 0 1 6 - 2 6 mg/dL   Phosphorus    Collection Time: 11/14/19  6:50 AM   Result Value Ref Range    Phosphorus 2 9 2 3 - 4 1 mg/dL   Protime-INR    Collection Time: 11/14/19  8:49 AM   Result Value Ref Range    Protime 13 8 11 6 - 14 5 seconds    INR 1 10 0 84 - 1 19   Platelet count    Collection Time: 11/14/19  8:49 AM   Result Value Ref Range    Platelets 525 119 - 992 Thousands/uL    MPV 10 3 8 9 - 12 7 fL   Fingerstick Glucose (POCT)    Collection Time: 11/14/19 10:59 AM   Result Value Ref Range    POC Glucose 104 65 - 140 mg/dl   Glucose, CSF    Collection Time: 11/14/19 12:24 PM   Result Value Ref Range    Glucose, CSF 66 50 - 80 mg/dL   Total Protein, CSF    Collection Time: 11/14/19 12:24 PM   Result Value Ref Range    Protein, CSF 34 15 - 45 mg/dL            panel  Results from last 7 days   Lab Units 11/14/19  0650   POTASSIUM mmol/L 4 0   CHLORIDE mmol/L 108   BUN mg/dL 9   CREATININE mg/dL 0 79    Results from last 7 days   Lab Units 11/14/19  0849 11/14/19  0650  11/11/19  1656   HEMATOCRIT %  --  43 1   < >  --    HEMOGLOBIN g/dL  --  14 0   < >  --    MCH pg  --  30 9   < >  --    MCHC g/dL  --  32 5   < >  --    MCV fL  --  95   < >  --    MPV fL 10 3 10 4   < >  --    PLATELETS Thousands/uL 167 176   < >  --    RDW %  --  15 1   < >  --    WBC UA /hpf  --   --   --  10-20*   WBC Thousand/uL  --  10 29*   < >  --     < > = values in this interval not displayed  Results from last 7 days   Lab Units 11/14/19  0849 11/11/19  0950   INR  1 10 0 96   PTT seconds  --  24*    Results from last 7 days   Lab Units 11/14/19  0650   SODIUM mmol/L 138   CO2 mmol/L 22   BUN mg/dL 9   CREATININE mg/dL 0 79    Lab Results   Component Value Date    ALT 24 11/14/2019    AST 26 11/14/2019    GGT 58 11/11/2019    ALKPHOS 94 11/14/2019    TBILI 1 29 (H) 11/14/2019          Invalid input(s): TRIGLYCERIDE Lab Results   Component Value Date    HGBA1C 6 5 (H) 11/11/2019    TSH i: No results found for: TSH Imaging: CT head         ASSESSMENT/PLAN    68 y/o F presents secondary to altered mental status and seizures  Will continue to monitor on EEG for any seizure-like activity  Will also monitor on tele for possible arrhythmias  Imaging performed showed cortical thickening in posterior temporal and occipital areas concerning for neoplasm  LP should be obtained as well as possible brain biopsy  PT/OT/Speech    Neurochecks    DVT PPX   Euglycemia (140-180 goal)   Normothermia     Please call with questions

## 2019-11-14 NOTE — SOCIAL WORK
Attempted to meet with pt to discuss dc plan  Pt confused  CM called pt's son Pawan Asher; pt's daughter in-law Avery Cronin answered call  Avery Cronin states Pawan Asher is on his way to visit  CM reviewed pt's prior level of functioning with Avery Cronni  Pt lives with her son Pawan Asher and his wife Avery Cronin in a 2 story home with bed/bathroom on the 2nd floor  Pt performed ADL's indptly pta, no use of DME  No hx of HHC or rehab  Pt's preferred pharmacy is [facility] in [place]  No hx of mental health or D&A treatment  Contact: Pawan Asher (son) [phone] (c) or [phone] (h)   Avery Cronin (daughter in-law) [phone]  No POA or living will  CM reviewed d/c planning process including the following: identifying help at home, patient preference for d/c planning needs, Discharge Lounge, [facility] Meds to Bed program, availability of treatment team to discuss questions or concerns patient and/or family may have regarding understanding medications and recognizing signs and symptoms once discharged  CM also encouraged patient to follow up with all recommended appointments after discharge  Patient advised of importance for patient and family to participate in managing patients medical well being  Patient/caregiver received discharge checklist  Content reviewed  Patient/caregiver encouraged to participate in discharge plan of care prior to discharge home

## 2019-11-14 NOTE — ASSESSMENT & PLAN NOTE
· Neurology following, appreciate input   · Currently on EEG, concerning for periodic seizures  · Given ativan and load of keppra this morning  · Currently on Keppra 1750mg BID and depacon

## 2019-11-14 NOTE — ASSESSMENT & PLAN NOTE
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 considering history of breast cancer   - 11/11/19 CT Head- "No acute intracranial abnormality  Microangiopathic changes  "  - 11/12/19 CTA Head/Neck- "Subtle area of high density in the undersurface of the posterior right temporal lobe  Minor reactive prominence of blood vessels in this region  See contemporary MR  No large vessel flow restrictive disease within the head or neck despite diffuse, minor atherosclerotic changes  "  - 11/12/19 MRI brain- "Cortical thickening in the undersurface of the right posterior temporal lobe extending to the occipital lobe  No vasogenic edema  Minor parenchymal enhancement and minor focal diffusion restriction  Concern for neoplasm, to include lymphoma  Lumbar   puncture is suggested  "  - CT chest abdomen pelvis negative for source of malignancy  - Plan for IR guided lumbar puncture for cytology, leukemia/lymphoma cytometry, CSF studies, HSV PCR   - Transferred to [facility] for neurosurgical evaluation, possible biopsy of mass   - Per neurology and oncology, avoid steroids unless absolutely necessary   - Neuro checks every 4 hours

## 2019-11-14 NOTE — CONSULTS
Oncology Consult Note  Wolfgang Munoz 69 y o  female MRN: [id number]  Unit/Bed#: [facility] 683-16 Encounter: [id number]      Presenting Complaint:  Mental status changes    History of Presenting Illness: The patient is a pleasant 68-year-old female with a past medical history of breast cancer more than a decade ago who was brought in to [facility] Emergency room with altered mental status without clear etiology  An MRI revealed cortical thickening of the right posterior temporal lobe extending to the occipital lobe  Working diagnosis includes of CNS lymphoma versus recurrence of her breast cancer  She was transferred to [facility] for further evaluation and workup  She is presently undergoing an EEG and appears to be sleeping period is not very arousable  Her son and  are in the room with her  Fourteen point review of systems as obtainable was negative  Review of Systems - As stated in the HPI otherwise the fourteen point review of systems was negative      Past Medical History:   Diagnosis Date    Bacterial pneumonia     Last Assessed: 11/14/2013    Breast cancer (HCC)     hx    Diabetes mellitus ([facility] [address] )     Hyperlipidemia     Hypertension        Social History     Socioeconomic History    Marital status: Single     Spouse name: Not on file    Number of children: Not on file    Years of education: Not on file    Highest education level: Not on file   Occupational History    Not on file   Social Needs    Financial resource strain: Not on file    Food insecurity:     Worry: Not on file     Inability: Not on file    Transportation needs:     Medical: Not on file     Non-medical: Not on file   Tobacco Use    Smoking status: Current Every Day Smoker     Packs/day: 1 00    Smokeless tobacco: Current User   Substance and Sexual Activity    Alcohol use: Not Currently    Drug use: Not Currently    Sexual activity: Not Currently   Lifestyle    Physical activity:     Days per week: Not on file Minutes per session: Not on file    Stress: Not on file   Relationships    Social connections:     Talks on phone: Not on file     Gets together: Not on file     Attends [employer]ism service: Not on file     Active member of club or organization: Not on file     Attends meetings of clubs or organizations: Not on file     Relationship status: Not on file    Intimate partner violence:     Fear of current or ex partner: Not on file     Emotionally abused: Not on file     Physically abused: Not on file     Forced sexual activity: Not on file   Other Topics Concern    Not on file   Social History Narrative    Not on file       Family History   Problem Relation Age of Onset    Heart disease Mother     Heart disease Father        Allergies   Allergen Reactions    Percocet [Oxycodone-Acetaminophen]      Patient tolerated Tylenol in the past in 2016         Current Facility-Administered Medications:     acetaminophen (TYLENOL) tablet 650 mg, 650 mg, Oral, Q6H PRN, Angela Marshall MD    atorvastatin (LIPITOR) tablet 40 mg, 40 mg, Oral, Daily With Randi Kasper MD    docusate sodium (COLACE) capsule 100 mg, 100 mg, Oral, BID, Angela Marshall MD    hydrocortisone 2 5 % ointment, , Topical, BID, Angela Marshall MD    insulin lispro (HumaLOG) 100 units/mL subcutaneous injection 1-5 Units, 1-5 Units, Subcutaneous, Q6H [address] **AND** Fingerstick Glucose (POCT), , , Q6H, Samantha Novoa DO    lacosamide (VIMPAT) 200 mg in sodium chloride 0 9 % 100 mL IVPB, 200 mg, Intravenous, Q12H, Samantha Novoa DO    levETIRAcetam (KEPPRA) 1,750 mg in sodium chloride 0 9 % 250 mL IVPB, 1,750 mg, Intravenous, Q12H [address], Samantha Novoa DO, Stopped at 11/14/19 0847    lisinopril (ZESTRIL) tablet 40 mg, 40 mg, Oral, Daily, Angela Marshall MD    LORazepam (ATIVAN) 2 mg/mL injection **ADS Override Pull**, , , ,     sodium chloride 0 9 % infusion, 100 mL/hr, Intravenous, Continuous, Samantha Novoa DO, Last Rate: 100 mL/hr at 11/14/19 0003, 100 mL/hr at 11/14/19 0003      /68   Pulse (!) 49   Temp 99 °F (37 2 °C)   Resp 20   Ht 4' 11" (1 499 m)   Wt 61 6 kg (135 lb 12 9 oz)   LMP 05/29/1998   SpO2 93%   BMI 27 43 kg/m²     General Appearance:    Sommulent       Eyes:    PERRL   Ears:    Normal external ear canals, both ears   Nose:   Nares normal, septum midline   Throat:   Mucosa moist  Pharynx without injection  Neck:   Supple       Lungs:     Clear to auscultation bilaterally   Chest Wall:    No tenderness or deformity    Heart:    Regular rate and rhythm       Abdomen:     Soft, non-tender, bowel sounds +, no organomegaly           Extremities:   Extremities no cyanosis or edema       Skin:   no rash or icterus      Lymph nodes:   Cervical, supraclavicular, and axillary nodes normal           Recent Results (from the past 48 hour(s))   Fingerstick Glucose (POCT)    Collection Time: 11/12/19  4:26 PM   Result Value Ref Range    POC Glucose 172 (H) 65 - 140 mg/dl   Fingerstick Glucose (POCT)    Collection Time: 11/12/19  9:06 PM   Result Value Ref Range    POC Glucose 110 65 - 140 mg/dl   Comprehensive metabolic panel    Collection Time: 11/13/19  6:20 AM   Result Value Ref Range    Sodium 135 (L) 136 - 145 mmol/L    Potassium 3 9 3 5 - 5 3 mmol/L    Chloride 104 100 - 108 mmol/L    CO2 22 21 - 32 mmol/L    ANION GAP 9 4 - 13 mmol/L    BUN 8 5 - 25 mg/dL    Creatinine 0 78 0 60 - 1 30 mg/dL    Glucose 104 65 - 140 mg/dL    Calcium 8 2 (L) 8 3 - 10 1 mg/dL    AST 25 5 - 45 U/L    ALT 22 12 - 78 U/L    Alkaline Phosphatase 99 46 - 116 U/L    Total Protein 6 6 6 4 - 8 2 g/dL    Albumin 3 4 (L) 3 5 - 5 0 g/dL    Total Bilirubin 1 40 (H) 0 20 - 1 00 mg/dL    eGFR 76 ml/min/1 73sq m   CBC and differential    Collection Time: 11/13/19  6:20 AM   Result Value Ref Range    WBC 8 10 4 31 - 10 16 Thousand/uL    RBC 4 59 3 81 - 5 12 Million/uL    Hemoglobin 14 2 11 5 - 15 4 g/dL    Hematocrit 46 1 34 8 - 46 1 %     (H) 82 - 98 fL MCH 30 9 26 8 - 34 3 pg    MCHC 30 8 (L) 31 4 - 37 4 g/dL    RDW 14 6 11 6 - 15 1 %    MPV 10 7 8 9 - 12 7 fL    Platelets 424 231 - 069 Thousands/uL    nRBC 0 /100 WBCs    Neutrophils Relative 58 43 - 75 %    Immat GRANS % 0 0 - 2 %    Lymphocytes Relative 28 14 - 44 %    Monocytes Relative 13 (H) 4 - 12 %    Eosinophils Relative 1 0 - 6 %    Basophils Relative 0 0 - 1 %    Neutrophils Absolute 4 73 1 85 - 7 62 Thousands/µL    Immature Grans Absolute 0 02 0 00 - 0 20 Thousand/uL    Lymphocytes Absolute 2 23 0 60 - 4 47 Thousands/µL    Monocytes Absolute 1 05 0 17 - 1 22 Thousand/µL    Eosinophils Absolute 0 05 0 00 - 0 61 Thousand/µL    Basophils Absolute 0 02 0 00 - 0 10 Thousands/µL   Magnesium    Collection Time: 11/13/19  6:20 AM   Result Value Ref Range    Magnesium 1 7 1 6 - 2 6 mg/dL   Phosphorus    Collection Time: 11/13/19  6:20 AM   Result Value Ref Range    Phosphorus 2 1 (L) 2 3 - 4 1 mg/dL   CEA    Collection Time: 11/13/19  6:20 AM   Result Value Ref Range    CEA 3 4 (H) 0 0 - 3 0 ng/mL   AFP tumor marker    Collection Time: 11/13/19  6:20 AM   Result Value Ref Range    AFP TUMOR MARKER 4 6 0 5 - 8 ng/mL   Fingerstick Glucose (POCT)    Collection Time: 11/13/19  7:03 AM   Result Value Ref Range    POC Glucose 90 65 - 140 mg/dl   Fingerstick Glucose (POCT)    Collection Time: 11/13/19 11:24 AM   Result Value Ref Range    POC Glucose 169 (H) 65 - 140 mg/dl   Fingerstick Glucose (POCT)    Collection Time: 11/13/19  4:42 PM   Result Value Ref Range    POC Glucose 80 65 - 140 mg/dl   Fingerstick Glucose (POCT)    Collection Time: 11/13/19  8:29 PM   Result Value Ref Range    POC Glucose 101 65 - 140 mg/dl   Fingerstick Glucose (POCT)    Collection Time: 11/13/19 11:51 PM   Result Value Ref Range    POC Glucose 111 65 - 140 mg/dl   Blood gas, arterial    Collection Time: 11/14/19  5:06 AM   Result Value Ref Range    pH, Arterial 7 381 7 350 - 7 450    pCO2, Arterial 34 3 (L) 36 0 - 44 0 mm Hg    pO2, Arterial 64 0 (L) 75 0 - 129 0 mm Hg    HCO3, Arterial 19 9 (L) 22 0 - 28 0 mmol/L    Base Excess, Arterial -4 4 mmol/L    O2 Content, Arterial 18 6 (L) 95 0 - 100 0 mL/dL    O2 HGB,Arterial  91 8 (L) 94 0 - 97 0 %    SOURCE RESULTS FROM SPECIMEN COLLECTED 11/14/19 @ 0141    Fingerstick Glucose (POCT)    Collection Time: 11/14/19  6:12 AM   Result Value Ref Range    POC Glucose 89 65 - 140 mg/dl   Fingerstick Glucose (POCT)    Collection Time: 11/14/19  6:18 AM   Result Value Ref Range    POC Glucose 97 65 - 140 mg/dl   CBC and differential    Collection Time: 11/14/19  6:50 AM   Result Value Ref Range    WBC 10 29 (H) 4 31 - 10 16 Thousand/uL    RBC 4 53 3 81 - 5 12 Million/uL    Hemoglobin 14 0 11 5 - 15 4 g/dL    Hematocrit 43 1 34 8 - 46 1 %    MCV 95 82 - 98 fL    MCH 30 9 26 8 - 34 3 pg    MCHC 32 5 31 4 - 37 4 g/dL    RDW 15 1 11 6 - 15 1 %    MPV 10 4 8 9 - 12 7 fL    Platelets 015 847 - 350 Thousands/uL    nRBC 0 /100 WBCs    Neutrophils Relative 65 43 - 75 %    Immat GRANS % 0 0 - 2 %    Lymphocytes Relative 23 14 - 44 %    Monocytes Relative 12 4 - 12 %    Eosinophils Relative 0 0 - 6 %    Basophils Relative 0 0 - 1 %    Neutrophils Absolute 6 64 1 85 - 7 62 Thousands/µL    Immature Grans Absolute 0 04 0 00 - 0 20 Thousand/uL    Lymphocytes Absolute 2 36 0 60 - 4 47 Thousands/µL    Monocytes Absolute 1 20 0 17 - 1 22 Thousand/µL    Eosinophils Absolute 0 02 0 00 - 0 61 Thousand/µL    Basophils Absolute 0 03 0 00 - 0 10 Thousands/µL   Comprehensive metabolic panel    Collection Time: 11/14/19  6:50 AM   Result Value Ref Range    Sodium 138 136 - 145 mmol/L    Potassium 4 0 3 5 - 5 3 mmol/L    Chloride 108 100 - 108 mmol/L    CO2 22 21 - 32 mmol/L    ANION GAP 8 4 - 13 mmol/L    BUN 9 5 - 25 mg/dL    Creatinine 0 79 0 60 - 1 30 mg/dL    Glucose 87 65 - 140 mg/dL    Calcium 8 8 8 3 - 10 1 mg/dL    AST 26 5 - 45 U/L    ALT 24 12 - 78 U/L    Alkaline Phosphatase 94 46 - 116 U/L    Total Protein 6 7 6 4 - 8 2 g/dL    Albumin 3 4 (L) 3 5 - 5 0 g/dL    Total Bilirubin 1 29 (H) 0 20 - 1 00 mg/dL    eGFR 75 ml/min/1 73sq m   Magnesium    Collection Time: 11/14/19  6:50 AM   Result Value Ref Range    Magnesium 2 0 1 6 - 2 6 mg/dL   Phosphorus    Collection Time: 11/14/19  6:50 AM   Result Value Ref Range    Phosphorus 2 9 2 3 - 4 1 mg/dL   Protime-INR    Collection Time: 11/14/19  8:49 AM   Result Value Ref Range    Protime 13 8 11 6 - 14 5 seconds    INR 1 10 0 84 - 1 19   Platelet count    Collection Time: 11/14/19  8:49 AM   Result Value Ref Range    Platelets 719 766 - 285 Thousands/uL    MPV 10 3 8 9 - 12 7 fL   Fingerstick Glucose (POCT)    Collection Time: 11/14/19 10:59 AM   Result Value Ref Range    POC Glucose 104 65 - 140 mg/dl         Cta Head And Neck W Wo Contrast    Result Date: 11/12/2019  Narrative: CTA NECK AND BRAIN WITH AND WITHOUT CONTRAST INDICATION: Neuro deficit, acute, stroke suspected Dizziness, non-specific COMPARISON:   Contemporary MR brain, CT performed one day earlier TECHNIQUE:  Routine CT imaging of the Brain without contrast   Post contrast imaging was performed after administration of iodinated contrast through the neck and brain  Post contrast axial 0 625 mm images timed to opacify the arterial system  3D rendering was performed on an independent workstation  MIP reconstructions performed  Coronal reconstructions were performed of the noncontrast portion of the brain  Radiation dose length product (DLP) for this visit:  1193 55 mGy-cm   This examination, like all CT scans performed in the [facility], was performed utilizing techniques to minimize radiation dose exposure, including the use of iterative reconstruction and automated exposure control     IV Contrast:  85 mL of iohexol (OMNIPAQUE)  IMAGE QUALITY:   Diagnostic FINDINGS: NONCONTRAST BRAIN PARENCHYMA:  There is subtle focus of high density along the undersurface of the posterior right temporal occipital junction, series 2 image 15  No edema is evident  Minor degree of chronic small vessel disease  VENTRICLES AND EXTRA-AXIAL SPACES:  Normal for the patient's age  VISUALIZED ORBITS AND PARANASAL SINUSES:  Unremarkable  CERVICAL VASCULATURE AORTIC ARCH AND GREAT VESSELS:  Normal aortic arch and great vessel origins  Normal visualized subclavian vessels  RIGHT VERTEBRAL ARTERY CERVICAL SEGMENT:  Normal origin  The vessel is normal in caliber throughout the neck  LEFT VERTEBRAL ARTERY CERVICAL SEGMENT:  Nondominant vessel, arises from the arch  The vessel is normal in caliber throughout the neck  RIGHT EXTRACRANIAL CAROTID SEGMENT:  Normal caliber common carotid artery  Normal bifurcation and cervical internal carotid artery  No stenosis or dissection  Trace calcification at the bifurcation without hemodynamically significant stenosis  LEFT EXTRACRANIAL CAROTID SEGMENT:  Normal caliber common carotid artery  Normal bifurcation and cervical internal carotid artery  No stenosis or dissection  Trace calcification at the bifurcation, without hemodynamically significant stenosis  NASCET criteria was used to determine the degree of internal carotid artery diameter stenosis  INTRACRANIAL VASCULATURE INTERNAL CAROTID ARTERIES:  Normal enhancement of the intracranial portions of the internal carotid arteries  Normal ophthalmic artery origins  Normal ICA terminus  ANTERIOR CIRCULATION:  Left A1 is dominant, both are patent, anterior communicating artery distal ASHLEY vessels in the inner hemispheric fissure are normal  MIDDLE CEREBRAL ARTERY CIRCULATION:  M1 segment and middle cerebral artery branches demonstrate normal enhancement bilaterally  DISTAL VERTEBRAL ARTERIES:  Normal distal vertebral arteries  Posterior inferior cerebellar artery origins are normal  Normal vertebral basilar junction  BASILAR ARTERY:  Basilar artery is normal in caliber  Normal superior cerebellar arteries   POSTERIOR CEREBRAL ARTERIES: Both posterior cerebral arteries arises from the basilar tip  Both arteries demonstrate normal enhancement  Normal posterior communicating arteries  Prominence of distal MCA and PCA vessels in the region of the abnormality in the posterior temporal occipital region  DURAL VENOUS SINUSES:  Normal  NON VASCULAR ANATOMY BONY STRUCTURES:  No acute osseous abnormality  SOFT TISSUES OF THE NECK:  Normal  THORACIC INLET:  Shotty nodes in the mediastinum  Impression: Subtle area of high density in the undersurface of the posterior right temporal lobe  Minor reactive prominence of blood vessels in this region  See contemporary MR  No large vessel flow restrictive disease within the head or neck despite diffuse, minor atherosclerotic changes    Workstation performed: [id number]     Xr Chest 1 View Portable    Result Date: 11/11/2019  Narrative: CHEST INDICATION:   Altered mental status  COMPARISON:  10/5/2012 EXAM PERFORMED/VIEWS:  XR CHEST PORTABLE FINDINGS: Cardiomediastinal silhouette appears unremarkable  The lungs are clear  No pneumothorax or pleural effusion  Osseous structures appear within normal limits for patient age  Impression: No acute cardiopulmonary disease  Workstation performed: [id number]     Ct Head Wo Contrast    Result Date: 11/13/2019  Narrative: CT BRAIN - WITHOUT CONTRAST INDICATION:   Altered mental status  COMPARISON:  November 12, 2019 TECHNIQUE:  CT examination of the brain was performed  In addition to axial images, coronal 2D reformatted images were created and submitted for interpretation  Radiation dose length product (DLP) for this visit:  882 55 mGy-cm   This examination, like all CT scans performed in the [facility], was performed utilizing techniques to minimize radiation dose exposure, including the use of iterative  reconstruction and automated exposure control  IMAGE QUALITY:  Diagnostic   FINDINGS: PARENCHYMA: Decreased attenuation is noted in periventricular and subcortical white matter demonstrating an appearance that is statistically most likely to represent mild microangiopathic change; this appearance is similar when compared to most recent prior examination  No CT signs of acute infarction  No intracranial mass, mass effect or midline shift  No acute parenchymal hemorrhage  VENTRICLES AND EXTRA-AXIAL SPACES:  Normal for the patient's age  VISUALIZED ORBITS AND PARANASAL SINUSES:  Unremarkable  CALVARIUM AND EXTRACRANIAL SOFT TISSUES:  Normal      Impression: No acute intracranial abnormality  Workstation performed: [id number]     Ct Head Without Contrast    Result Date: 11/11/2019  Narrative: CT BRAIN - WITHOUT CONTRAST INDICATION:   Altered mental status  COMPARISON:  None  TECHNIQUE:  CT examination of the brain was performed  In addition to axial images, coronal 2D reformatted images were created and submitted for interpretation  Radiation dose length product (DLP) for this visit:  881 37 mGy-cm   This examination, like all CT scans performed in the [facility], was performed utilizing techniques to minimize radiation dose exposure, including the use of iterative  reconstruction and automated exposure control  IMAGE QUALITY:  Diagnostic  FINDINGS: PARENCHYMA: Decreased attenuation is noted in periventricular and subcortical white matter demonstrating an appearance that is statistically most likely to represent mild microangiopathic change  No CT signs of acute infarction  No intracranial mass, mass effect or midline shift  No acute parenchymal hemorrhage  VENTRICLES AND EXTRA-AXIAL SPACES:  Normal for the patient's age  VISUALIZED ORBITS AND PARANASAL SINUSES:  Unremarkable  CALVARIUM AND EXTRACRANIAL SOFT TISSUES:  Normal      Impression: No acute intracranial abnormality  Microangiopathic changes   Workstation performed: [id number]     Mri Brain W Wo Contrast    Result Date: 11/12/2019  Narrative: MRI BRAIN WITH AND WITHOUT CONTRAST INDICATION: CVA  COMPARISON:  Contemporary CT/CTA TECHNIQUE: Sagittal T1, axial T2, axial FLAIR, axial T1, axial [place], axial diffusion  Sagittal, axial T1 postcontrast   Axial bravo postcontrast with coronal reconstructions  IV Contrast:  6 mL of Gadobutrol injection (SINGLE-DOSE)  IMAGE QUALITY:   Intermittent motion related artifact FINDINGS: BRAIN PARENCHYMA: Thickening of the cortex in the undersurface of the temporal occipital region on the right, series 5 and series 8 image 10  No discrete vasogenic edema  Relatively tiny focus of diffusion pathology series 3 image 14 with subtle signal  correlate with on the ADC  Findings may be related to subacute ischemia  However, appearance of the thickened cortex more in keeping with cerebritis or more likely neoplasm  Very minor splotchy enhancement on postcontrast imaging, series 9 image 16  This is within the parenchyma and also to some degree along the right tentorium  Query lymphoma  Diffuse generalized volume loss consistent with age  Minor degree of chronic small vessel disease  VENTRICLES:  Normal for the patient's age  SELLA AND PITUITARY GLAND:  Normal  ORBITS:  Normal  PARANASAL SINUSES:  Normal  VASCULATURE:  Evaluation of the major intracranial vasculature demonstrates appropriate flow voids  CALVARIUM AND SKULL BASE:  Normal  EXTRACRANIAL SOFT TISSUES:  Normal      Impression: Cortical thickening in the undersurface of the right posterior temporal lobe extending to the occipital lobe  No vasogenic edema  Minor parenchymal enhancement and minor focal diffusion restriction  Concern for neoplasm, to include lymphoma  Lumbar puncture is suggested  The study was marked in [facility] for immediate notification  Workstation performed: [id number]     Ct Chest Abdomen Pelvis W Contrast    Result Date: 11/13/2019  Narrative: CT CHEST, ABDOMEN AND PELVIS WITH IV CONTRAST INDICATION:   Metastases suspected, unknown primary    Dr Krysta Hughes's note from 11/13/2019 was reviewed  Patient has history of left-sided breast cancer  COMPARISON:  chest CT from 6/5/2008, and chest, abdomen, pelvic CT from 4/14/2006 TECHNIQUE: CT examination of the chest, abdomen and pelvis was performed  Axial, sagittal, and coronal 2D reformatted images were created from the source data and submitted for interpretation  Radiation dose length product (DLP) for this visit:  512 88 mGy-cm   This examination, like all CT scans performed in the [facility], was performed utilizing techniques to minimize radiation dose exposure, including the use of iterative  reconstruction and automated exposure control  IV Contrast:  100 mL of iohexol (OMNIPAQUE) 50 mL of iohexol (OMNIPAQUE) Enteric Contrast: Enteric contrast was administered  FINDINGS: CHEST LUNGS:  There is a 5 mm groundglass density right upper lobe pulmonary nodule (series 2 image 22 ) This was not present on prior CTs  Lung parenchyma is otherwise clear  There is no tracheal or endobronchial lesion  PLEURA:  Unremarkable  HEART/GREAT VESSELS:  Unremarkable for patient's age  MEDIASTINUM AND ANGELIQUE:  Unremarkable  CHEST WALL AND LOWER NECK:   Status post left mastectomy  ABDOMEN LIVER/BILIARY TREE:  Unremarkable  GALLBLADDER:  No calcified gallstones  No pericholecystic inflammatory change  SPLEEN:  Unremarkable  PANCREAS:  Unremarkable  ADRENAL GLANDS:  Unremarkable  KIDNEYS/URETERS:  One or more sharply circumscribed subcentimeter renal hypodensities are noted  These lesions are too small to accurately characterize, but are statistically most likely to represent benign cortical renal cyst(s)  According to the guidelines published in the [facility] Paper of the ACR Incidental Findings Committee (Radiology 2010), no further workup of these lesions is recommended  STOMACH AND BOWEL:  Unremarkable  APPENDIX:  No findings to suggest appendicitis  ABDOMINOPELVIC CAVITY:  No ascites or free intraperitoneal air   No lymphadenopathy  VESSELS:  Unremarkable for patient's age  PELVIS REPRODUCTIVE ORGANS:  Unremarkable for patient's age  URINARY BLADDER:  Unremarkable  ABDOMINAL WALL/INGUINAL REGIONS:  Unremarkable  OSSEOUS STRUCTURES:  No acute fracture or destructive osseous lesion  Impression: There are no findings that carry high suspicion for malignancy in the chest, abdomen, and pelvis  There is a 5 mm groundglass density right upper lobe pulmonary nodule (series 2 image 22 ) Based on current Fleischner Society 2017 Guidelines on incidental pulmonary nodule, patients with a known malignancy are at increased risk of metastasis and should  receive initial three month follow-up chest CT  Workstation performed: [id number]     Us Right Upper Quadrant    Result Date: 11/12/2019  Narrative: RIGHT UPPER QUADRANT ULTRASOUND INDICATION:  Right upper quadrant pain  COMPARISON: None  TECHNIQUE:   Real-time ultrasound of the right upper quadrant was performed with a curvilinear transducer with both volumetric sweeps and still imaging techniques  FINDINGS: PANCREAS:  Visualized portions show no abnormality  AORTA AND IVC:  Visualized portions are normal for patient age  LIVER: Normal size  Echogenicity and contour are normal  No evidence of mass  Normal directional flow is present within the portal vein  BILIARY: The gallbladder is normal in caliber  No wall thickening or pericholecystic fluid  Cholelithiasis is present  No sonographic Schwab's sign  No intrahepatic biliary dilatation  CBD measures 5 mm  No choledocholithiasis  KIDNEY: Right kidney measures 8 8 x 3 point cm  13 mm right renal cyst is noted  Right kidney is otherwise unremarkable    ASCITES:   None  Impression: Cholelithiasis without evidence of acute cholecystitis  Workstation performed: [id number]       Assessment and Plan:   This is an unfortunate 72-year-old female with a past medical history of left-sided breast cancer which apparently was treated outside of [facility] although the son thinks she may have been treated as [facility] who was admitted to the hospital with mental status changes  Imaging had shown cortical thickening in the under surface of the right posterior temporal lobe extending to the occipital lobes for which malignancy needed to be ruled out  Differential includes lymphoma versus recurrent breast cancer  The patient's son states he remember she may have had a recurrence in the spine years ago according to him although he has no details on treatment or if this was even cancer  He states he remember she stated there was a spot on her spine and in her neck once but does not remember if she had any treatment or if this was cancer  Regardless she is being set up for lumbar puncture to evaluate cytology  If this is negative we will need some form of tissue diagnosis  Her CT scan of the chest abdomen pelvis did not show any evidence of metastatic disease  Dr Luis Elliott at [facility] had eluded to all this in his note from yesterday  I agree with his assessment  We will wait a tissue diagnosis prior to making any recommendations  I agree with his recommendation from yesterday to hold off on steroids unless absolutely necessary as if this was a lymphoma it may affect the results  I would recommend a neurology consult and a Neurosurgery consult for further assistance with workup and management

## 2019-11-14 NOTE — QUICK NOTE
CT Head shows no acute hemorrhage, pending ABG result  Patient seen and examined at bedside, wakes easily to voice and is alert, significantly improved  Able to hold a conversation, occasionally answers questions inappropriately, states "I have been through a lot " Moving all extremities equally  Mental status on arrival likely secondary to acute seizure versus post-ictal state  Continue to monitor and plan as per neurosurgery, epileptology/neurology, and oncology

## 2019-11-14 NOTE — QUICK NOTE
Received notification from neurology patient is having frequent brief focal electrographic seizures maximal over right posterior temporal region, and recommend maximizing levetiracetam- 1,500 mg IV now, and increased BID dosing to 1,750 mg  If continued, will likely suggest adding lacosamide  Discussed with nursing

## 2019-11-14 NOTE — PLAN OF CARE
Problem: Potential for Falls  Goal: Patient will remain free of falls  Description  INTERVENTIONS:  - Assess patient frequently for physical needs  -  Identify cognitive and physical deficits and behaviors that affect risk of falls    -  [place] fall precautions as indicated by assessment   - Educate patient/family on patient safety including physical limitations  - Instruct patient to call for assistance with activity based on assessment  - Modify environment to reduce risk of injury  - Consider OT/PT consult to assist with strengthening/mobility  Outcome: Progressing

## 2019-11-14 NOTE — QUICK NOTE
Alerted by epileptology patient continues to have seizures following additional levetiracetam- recommend starting lacosamide 400 mg IV now and then 200 mg IV every 12 hours

## 2019-11-14 NOTE — CONSULTS
Consult- [address] 1945, 69 y o  female MRN: [id number]    Unit/Bed#: [facility] 436-01 Encounter: [id number]    Primary Care Provider: Alen Shah MD   Date and time admitted to hospital: 11/13/2019 10:19 PM      Inpatient consult to Neurosurgery  Consult performed by: Tania Cifuentes PA-C  Consult ordered by: Quang Norris DO      consult completed on 11/14/2019 at 0    * CNS mass  Assessment & Plan  Presented with AMS in the setting of known breast cancer    Imaging reviewed personally and with attending, results are as follows:  · MRI brain w wo contrast 11/14/2019:  Cortical thickening in the undersurface of the right posterior temporal lobe extending to the occipital lobe  No vasogenic edema  Minor parenchymal enhancement and minor focal diffusion restriction  Concern for neoplasm, to include lymphoma  · CT chest abdomen pelvis 11/13/2019:  No findings that carry high suspicion for malignancy  Plan:  · Repeat CT head STAT if GCS declines more than 2 points in one hour  · Medical oncology following, appreciate input, recommend against steroids in the event this is lymphoma, would like biopsy to confirm pathology  · Agree with recommendations, would advise against steroid treatment at this juncture  · Medical management and pain control per primary team  · DVT ppx:  SCDs, okay for pharm DVT ppx from [address] [place] [address] standpoint  · Mobilize as tolerated with assistance, PT / OT evaluation    Neurosurgery will continue to follow  Ongoing discussion with NSX team regarding need for bx and timing of procedure  Please call with questions or concerns      Seizures ([facility] [address] )  Assessment & Plan  · Neurology following, appreciate input   · Currently on EEG, concerning for periodic seizures  · Given ativan and load of keppra this morning  · Currently on Keppra 1750mg BID and depacon    Altered mental status  Assessment & Plan  See plan above    Essential hypertension  Assessment & Plan  · Currently on lisinopril, continue medical management  · SBP < 160    Controlled type 2 diabetes mellitus without complication, without long-term current use of insulin [facility])  Assessment & Plan  Lab Results   Component Value Date    HGBA1C 6 5 (H) 11/11/2019       Recent Labs     11/13/19  2351 11/14/19  0612 11/14/19  0618 11/14/19  1059   POCGLU 111 89 97 104       Blood Sugar Average: Last 72 hrs:  (P) 100 25     Medical management per primary team      History of Present Illness   HPI: Bill Medina is a 69y o  year old female with PMH including hx breast cancer s/p mastectomy, HTN, HLD, DM type II who transferred from [facility] with [facility]  MRI brain concerning for possible CNS lymphoma vs mets from breast cancer hx along with EEG concerning for seizure activity  She was transferred for possible bx of brain mass  At this time, patient is somnolent from receiving high doses of keppra and ativan earlier today for seizure activity and therefore ROS and hx is unable to be obtained from her  Family is not present during examination        Review of Systems   Unable to perform ROS: Mental status change       Historical Information   Past Medical History:   Diagnosis Date    Bacterial pneumonia     Last Assessed: 11/14/2013    Breast cancer (HCC)     hx    Diabetes mellitus ([facility] [address] )     Hyperlipidemia     Hypertension      Past Surgical History:   Procedure Laterality Date    COMPLETE MASTECTOMY W/ SENTINEL NODE BIOPSY Left     Resolved: 6/19/2007    FL LUMBAR PUNCTURE  11/14/2019     Social History     Substance and Sexual Activity   Alcohol Use Not Currently     Social History     Substance and Sexual Activity   Drug Use Not Currently     Social History     Tobacco Use   Smoking Status Current Every Day Smoker    Packs/day: 1 00   Smokeless Tobacco Current User     Family History   Problem Relation Age of Onset    Heart disease Mother     Heart disease Father        Meds/Allergies   all current active meds have been reviewed, current meds:   Current Facility-Administered Medications   Medication Dose Route Frequency    acetaminophen (TYLENOL) tablet 650 mg  650 mg Oral Q6H PRN    atorvastatin (LIPITOR) tablet 40 mg  40 mg Oral Daily With Dinner    docusate sodium (COLACE) capsule 100 mg  100 mg Oral BID    hydrocortisone 2 5 % ointment   Topical BID    insulin lispro (HumaLOG) 100 units/mL subcutaneous injection 1-5 Units  1-5 Units Subcutaneous Q6H FRANKIE    lacosamide (VIMPAT) 200 mg in sodium chloride 0 9 % 100 mL IVPB  200 mg Intravenous Q12H    levETIRAcetam (KEPPRA) 1,750 mg in sodium chloride 0 9 % 250 mL IVPB  1,750 mg Intravenous Q12H [facility]    lisinopril (ZESTRIL) tablet 40 mg  40 mg Oral Daily    LORazepam (ATIVAN) 2 mg/mL injection **ADS Override Pull**        sodium chloride 0 9 % infusion  100 mL/hr Intravenous Continuous    valproate (DEPACON) 1,232 mg in sodium chloride 0 9 % 50 mL BOLUS  20 mg/kg Intravenous Once    and PTA meds:   Prior to Admission Medications   Prescriptions Last Dose Informant Patient Reported? Taking? albuterol (PROVENTIL HFA,VENTOLIN HFA) 90 mcg/act inhaler   No No   Sig: Inhale 1-2 puffs every 6 (six) hours as needed for wheezing   Patient not taking: Reported on 6/6/2019   aspirin 81 MG tablet   Yes No   Sig: Take 81 mg by mouth daily     atorvastatin (LIPITOR) 80 mg tablet   No No   Sig: Take 1 tablet (80 mg total) by mouth daily   hydrocortisone 2 5 % ointment   No No   Sig: Apply topically 2 (two) times a day   ipratropium-albuterol (COMBIVENT RESPIMAT) inhaler   No No   Sig: Inhale 1 puff 2 (two) times a day   Patient not taking: Reported on 10/29/2018    lisinopril (ZESTRIL) 20 mg tablet   No No   Sig: Take 1 tablet (20 mg total) by mouth daily   metFORMIN (GLUCOPHAGE) 500 mg tablet   No No   Sig: Take 1 tablet (500 mg total) by mouth daily with breakfast   nitrofurantoin (MACROBID) 100 mg capsule   No No   Sig: Take 1 capsule (100 mg total) by mouth 2 (two) times a day Patient not taking: Reported on 11/11/2019   ondansetron (ZOFRAN) 4 mg tablet   No No   Sig: Take 1 tablet (4 mg total) by mouth every 6 (six) hours      Facility-Administered Medications: None     Allergies   Allergen Reactions    Percocet [Oxycodone-Acetaminophen]      Patient tolerated Tylenol in the past in 2016       Objective   I/O       11/12 0701 - 11/13 0700 11/13 0701 - 11/14 0700 11/14 0701 - 11/15 0700    I V  (mL/kg)  323 3 (5 2)     Total Intake(mL/kg)  323 3 (5 2)     Urine (mL/kg/hr)  450 376 (0 7)    Total Output  450 376    Net  -126 7 -376                 Physical Exam   Constitutional: She appears well-developed and well-nourished  She appears lethargic  Nasal cannula in place  HENT:   Head: Normocephalic and atraumatic  EEG leads in place   Eyes:   Pupils 1mm bilaterally and reactive   Cardiovascular: Bradycardia present  Neurological: She appears lethargic  Reflex Scores:       Bicep reflexes are 1+ on the right side and 1+ on the left side  Brachioradialis reflexes are 1+ on the right side and 1+ on the left side  Patellar reflexes are 1+ on the right side and 1+ on the left side  Achilles reflexes are 1+ on the right side and 1+ on the left side  Psychiatric: She is noncommunicative  Neurologic Exam     Mental Status   GCS 6 (E1, V1, M4)  Does not follow commands  Does not open eyes to voice or painful stimuli  Withdrawals to pain in all extremities     Motor Exam   Withdrawals to painful stimuli in all extremities     Gait, Coordination, and Reflexes     Reflexes   Right brachioradialis: 1+  Left brachioradialis: 1+  Right biceps: 1+  Left biceps: 1+  Right patellar: 1+  Left patellar: 1+  Right achilles: 1+  Left achilles: 1+  Right : 1+  Left : 1+  Right Biggs: absent  Left Biggs: absent  Right ankle clonus: absent  Left ankle clonus: absent      Vitals:Blood pressure 139/52, pulse (!) 43, temperature 98 9 °F (37 2 °C), resp   rate 16, height 4' 11" (1 499 m), weight 61 6 kg (135 lb 12 9 oz), last menstrual period 05/29/1998, SpO2 98 %, not currently breastfeeding  ,Body mass index is 27 43 kg/m²  Lab Results:   Results from last 7 days   Lab Units 11/14/19  0849 11/14/19  0650 11/13/19  0620 11/12/19  0459   WBC Thousand/uL  --  10 29* 8 10 9 39   HEMOGLOBIN g/dL  --  14 0 14 2 13 8   HEMATOCRIT %  --  43 1 46 1 42 5   PLATELETS Thousands/uL 167 176 174 168   NEUTROS PCT %  --  65 58 58   MONOS PCT %  --  12 13* 12     Results from last 7 days   Lab Units 11/14/19  0650 11/13/19  0620 11/12/19  0459   POTASSIUM mmol/L 4 0 3 9 4 1   CHLORIDE mmol/L 108 104 103   CO2 mmol/L 22 22 23   BUN mg/dL 9 8 15   CREATININE mg/dL 0 79 0 78 0 86   CALCIUM mg/dL 8 8 8 2* 8 4   ALK PHOS U/L 94 99 101   ALT U/L 24 22 22   AST U/L 26 25 22     Results from last 7 days   Lab Units 11/14/19  0650 11/13/19  0620 11/12/19  0459   MAGNESIUM mg/dL 2 0 1 7 1 8     Results from last 7 days   Lab Units 11/14/19  0650 11/13/19  0620 11/12/19  0459   PHOSPHORUS mg/dL 2 9 2 1* 2 6     Results from last 7 days   Lab Units 11/14/19  0849 11/11/19  0950   INR  1 10 0 96   PTT seconds  --  24*     No results found for: TROPONINT  ABG:  Lab Results   Component Value Date    PHART 7 381 11/14/2019    LPW9CNV 34 3 (L) 11/14/2019    PO2ART 64 0 (L) 11/14/2019    NWL9RHP 19 9 (L) 11/14/2019    BEART -4 4 11/14/2019    SOURCE RESULTS FROM SPECIMEN COLLECTED 11/14/19 @ 0141 11/14/2019       Imaging Studies: I have personally reviewed pertinent reports  and I have personally reviewed pertinent films in PACS    Ct Head Wo Contrast    Result Date: 11/13/2019  Impression: No acute intracranial abnormality  Workstation performed: [id number]     Ct Chest Abdomen Pelvis W Contrast    Result Date: 11/13/2019  Impression: There are no findings that carry high suspicion for malignancy in the chest, abdomen, and pelvis   There is a 5 mm groundglass density right upper lobe pulmonary nodule (series 2 image 22 ) Based on current Fleischner Society 2017 Guidelines on incidental pulmonary nodule, patients with a known malignancy are at increased risk of metastasis and should  receive initial three month follow-up chest CT  Workstation performed: [id number]     Fl Lumbar Puncture    Result Date: 11/14/2019  Impression: Successful fluoroscopically guided lumbar puncture utilizing Biofire technique  Procedure was performed by KENYATTA Jewell PA-C under the direct supervision of Dr Keyona Garcia performed: [id number]     EKG, Pathology, and Other Studies: I have personally reviewed pertinent reports  VTE Prophylaxis: Sequential compression device Murrel Pat)     Code Status: Level 1 - Full Code  Advance Directive and Living Will:      Power of :    POLST:      Counseling / Coordination of Care  I spent 15 minutes with the patient

## 2019-11-14 NOTE — OCCUPATIONAL THERAPY NOTE
Occupational Therapy Cancellation Note  OT orders received, pt's chart reviewed  Spoke with RN Luke Peoples who reports that pt is having seizures, just received Keppra, and is not medically appropriate for OT evaluation at time time  OT to continue to follow and re-attempt as medically and clinically appropriate      KOSTA Saucedo, OTR/L

## 2019-11-14 NOTE — SPEECH THERAPY NOTE
Speech Language/Pathology  Speech/Language Pathology Dysphagia Assessment    Patient Name: Dallas Nicolas  [id number]'[id number] Date: 11/14/2019     Problem List  Principal Problem:    CNS mass  Active Problems:    Controlled type 2 diabetes mellitus without complication, without long-term current use of insulin (HCC)    Essential hypertension    Altered mental status    Visual-spatial impairment    Seizures (HCC)    Past Medical History  Past Medical History:   Diagnosis Date    Bacterial pneumonia     Last Assessed: 11/14/2013    Breast cancer (HCC)     hx    Diabetes mellitus ([address] )     Hyperlipidemia     Hypertension      Past Surgical History  Past Surgical History:   Procedure Laterality Date    COMPLETE MASTECTOMY W/ SENTINEL NODE BIOPSY Left     Resolved: 6/19/2007        Bedside Swallow Evaluation:    Summary:  Pt presents w/ mild oral deficits related to lacking dentition  Per description, she eats soft foods such as pasta and minced meats at baseline  Will initiate a level 2 diet with thin liquids and assess for possible upgrade to level 3  Recommendations:  Diet: dysphagia level 2  Liquid: thin  Meds: as tolerated  ST for brief f/u to assure least restrictive diet (assess level 3)          HPI:  Per H&P:  69 y o  female with past medical history significant for history of breast cancer, hypertension and non-insulin dependent type 2 DM presenting as a transfer from [facility] for neurosurgical evaluation and EMU monitoring  She initially presented for altered mental status and confusion, and after further work-up a new lesion was discovered on MRI brain, as well as EEG findings concerning for electrographic seizures  She was being followed by neurology and oncology at [facility], who recommended transfer for possible biopsy of brain neoplasm   On arrival, the patient is very somnolent as outlined below in physical exam, and history was not able to be obtained directly from patient, history from chart review and sign out  Per nursing, patient is now incontinent of stool and urine  Reason for consult:  R/o aspiration  Determine safest and least restrictive diet  New neuro event    Current diet:  npo  Premorbid diet[de-identified]  Dysphagia level 2/3 per pt description  Minces meats  Previous VBS:  none  O2 requirement:  Room air  Voice/Speech:  hypophonic  Social:  Stated she lives with her son  Follows commands:  yes                        Cognitive Status:  A&Ox3  [facility]:  Dentition: edentulous  Does not wear dentures  Labial strength and ROM: wfls  Lingual strength and ROM: wfls  Mandibular strength and ROM: wfls    Items administered:  Puree, soft solid, hard solid, thin liquids  Liquids were taken by straw       Oral stage:  Lip closure: good  Mastication: prolonged for toast and avoided crust  Bolus formation: wfls  Bolus control: wfls  Transfer: good  Oral residue: no  Pocketing: no    Pharyngeal stage:  Swallow promptness: appeared prompt  Laryngeal rise: good per palpation  Wet voice: no  Throat clear: no  Cough: no  Secondary swallows: no  Audible swallows: no  No overt s/s aspiration    Esophageal stage:  No s/s reported    Results d/w:  Pt, nursing, physician

## 2019-11-15 NOTE — PROGRESS NOTES
Progress Note - Herminia Muñoz 1945, 69 y o  female MRN: [id number]    Unit/Bed#: [facility] 714-01 Encounter: [id number]    Primary Care Provider: Armando Riley MD   Date and time admitted to hospital: 11/13/2019 10:19 PM        Seizures ([address] )  Assessment & Plan  - Likely secondary to CNS mass as above, abnormal EEG concerning for periodic electrographic seizures noted, transferred for extended EMU monitoring   - EMU order/consult placed and case discussed with epileptologist    - On arrival, given 1500 Kepra IV bolus x2, Maximized on 1750mg BID   - 2 mg Ativan    - Added Lacosamide 400 mg bolus, 200 mg BID   - Given Valproate 20mg/kg bolus, loaded with 500 mg IV, and 300 mg q8, monitoring levels   - Seizure and aspiration precautions     Visual-spatial impairment  Assessment & Plan  - Unable to assess due to current mental status, noted on exam PTA, plan as above     Altered mental status  Assessment & Plan  - Overnight, patient was agitated and pulling out IV lines, required mitts   - Somnolent on exam, able to open eyes to voice but will not follow commands   - Patient is bradycardic during sleep, but in normal sinus rhythm   - On arrival, patient's neurologic exam and mental status appears to be worse than documented exam at [facility]- initial concern for acute bleed but less likely   - STAT CT head - negative for acute bleed    - ABG - 7 38/34/64/19 9;  Base Excess -4 4   - Likely encephalopathy secondary to above versus intermittent post-ictal state  - Continue EMU monitoring   - Will continue to monitor neurologic and mental status closely    - Speech and swallow recommending dysphagia level 2 diet     Essential hypertension  Assessment & Plan  - Blood pressure well-controlled, per neurology plan for normotension   - Continue lisinopril 40 mg daily     Controlled type 2 diabetes mellitus without complication, without long-term current use of insulin [facility])  Assessment & Plan  Lab Results   Component Value Date    HGBA1C 6 5 (H) 11/11/2019       Recent Labs     11/15/19  0032 11/15/19  0402 11/15/19  0532 11/15/19  0724   POCGLU 69 99 72 128       Blood Sugar Average: Last 72 hrs:  (P) 92 [id number]     - Overnight, episodes of asymptomatic hypoglycemia, given 3 amps of D5 with blood glucose response and started on continuous D5 75 mL/hr  - Suspect likely hypermetabolic state in the setting of seizures   - Continue to monitor     * CNS mass  Assessment & Plan  - Transferred to [facility] for neurosurgical evaluation, possible biopsy of mass   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 considering history of breast cancer   - 11/11/19 CT Head- "No acute intracranial abnormality  Microangiopathic changes  "  - 11/12/19 CTA Head/Neck- "Subtle area of high density in the undersurface of the posterior right temporal lobe  Minor reactive prominence of blood vessels in this region  See contemporary MR  No large vessel flow restrictive disease within the head or neck despite diffuse, minor atherosclerotic changes  "  - 11/12/19 MRI brain- "Cortical thickening in the undersurface of the right posterior temporal lobe extending to the occipital lobe  No vasogenic edema  Minor parenchymal enhancement and minor focal diffusion restriction  Concern for neoplasm, to include lymphoma  Lumbar   puncture is suggested  "  - CT chest abdomen pelvis negative for source of malignancy  - IR LP performed yesterday    - CSF glucose and WBC count within normal limits    -f/u cytology, leukemia/lymphoma cytometry, HSV PCR, gram stain   - Plan for possible biopsy by Neurosurgery if LP results are inconclusive   - Per neurology and oncology, avoid steroids unless absolutely necessary   - DVT ppx started yesterday following LP   - Neuro checks every 4 hours         Subjective/Objective     Subjective:     Overnight, patient was pulling out IV lines and required a mitt for restraint  She had a few episodes of hypoglycemia, which responded to amps of D5  Pt was seen and examined, she is not able to be interviewed this morning  She is somnolent, opens eyes to voice, but does not follow commands or respond  Lip smacking noted  Suspect possible seizure during interview  Objective:  Vitals: Blood pressure 156/96, pulse (!) 51, temperature 99 1 °F (37 3 °C), resp  rate 18, height 4' 11" (1 499 m), weight 61 6 kg (135 lb 12 9 oz), last menstrual period 05/29/1998, SpO2 93 %, not currently breastfeeding  ,Body mass index is 27 43 kg/m²  Intake/Output Summary (Last 24 hours) at 11/15/2019 1029  Last data filed at 11/15/2019 0740  Gross per 24 hour   Intake 2321 67 ml   Output 2478 ml   Net -156 33 ml       Invasive Devices     Peripheral Intravenous Line            Peripheral IV 11/14/19 Right Hand 1 day              Limited by inability to follow commands   Physical Exam   Constitutional: She appears well-developed and well-nourished  HENT:   Head: Normocephalic and atraumatic  Eyes: Pupils are equal, round, and reactive to light  Cardiovascular: Regular rhythm  Bradycardic   Pulmonary/Chest: Effort normal and breath sounds normal    Abdominal: Soft  Bowel sounds are normal    Neurological:   Somnolent, would not follow commands or reply to questions   Lipsmacking noted    Skin: Skin is warm and dry  Neurologic Exam     Cranial Nerves     CN III, IV, VI   Pupils are equal, round, and reactive to light  Lab, Imaging and other studies: I have personally reviewed pertinent reports      VTE Pharmacologic Prophylaxis: Heparin  VTE Mechanical Prophylaxis: sequential compression device

## 2019-11-15 NOTE — ASSESSMENT & PLAN NOTE
Presented with AMS in the setting of known breast cancer    Imaging reviewed personally and with attending, results are as follows:  · MRI brain w wo contrast 11/14/2019:  Cortical thickening in the undersurface of the right posterior temporal lobe extending to the occipital lobe  No vasogenic edema  Minor parenchymal enhancement and minor focal diffusion restriction  Concern for neoplasm, to include lymphoma  · CT chest abdomen pelvis 11/13/2019:  No findings that carry high suspicion for malignancy  Plan:  · Repeat CT head STAT if GCS declines more than 2 points in one hour  · Medical oncology following, appreciate input, recommend against steroids in the event this is lymphoma, would like biopsy to confirm pathology  · Reviewed imaging with attending this morning, likely low yield for biopsy given size of presumed lesion, may be more risk than benefit to pursue, could consider MRI brain in 4 weeks time to evaluate increase in size of mass and higher yield possibility  · Continue to hold steroids  · Medical management and pain control per primary team - recommend close monitoring for airway management in the setting of lethargy and possible need for intubation should she decline  · DVT ppx:  SCDs, heparin  · Mobilize as tolerated with assistance, PT / OT evaluation    Neurosurgery will follow as needed during hospitalization  Recommend medical optimization in the setting of continued seizure activity and AED management  Imaging does not fully explain why patient is having continued seizures and poor neuro exam   May be more realistic to follow up in outpatient setting in about 4 weeks with repeat MRI  No emergent NSX intervention anticipated at this juncture  Signed off, please call with questions or concerns

## 2019-11-15 NOTE — ASSESSMENT & PLAN NOTE
- Overnight, patient was agitated and pulling out IV lines, required mitts   - Somnolent on exam, able to open eyes to voice but will not follow commands   - Patient is bradycardic during sleep, but in normal sinus rhythm   - On arrival, patient's neurologic exam and mental status appears to be worse than documented exam at [facility]- initial concern for acute bleed but less likely   - STAT CT head - negative for acute bleed    - ABG - 7 38/34/64/19 9;  Base Excess -4 4   - Likely encephalopathy secondary to above versus intermittent post-ictal state  - Continue EMU monitoring   - Will continue to monitor neurologic and mental status closely    - Speech and swallow recommending dysphagia level 2 diet

## 2019-11-15 NOTE — PROGRESS NOTES
BG= 59  Fam med on-call made aware  Verbal order of half amp of dextrose (Dextrose 50% 25ml IV) obtained and given  Re-check BG was 112   Will continue to monitor

## 2019-11-15 NOTE — ASSESSMENT & PLAN NOTE
Lab Results   Component Value Date    HGBA1C 6 5 (H) 11/11/2019       Recent Labs     11/15/19  0532 11/15/19  0724 11/15/19  1148 11/15/19  1306   POCGLU 72 128 64* 132       Blood Sugar Average: Last 72 hrs:  (P) 44 [id number]     Medical management per primary team

## 2019-11-15 NOTE — QUICK NOTE
Brief Epilepsy/Neurology Note  Right posterior focal seizures remain frequent and have increased in duration and prevalence over the last 1-2 hours  Still no clinical correlate on video  Ordering Ativan 2 mg IV now       Gagan Smith MD   7:02 PM

## 2019-11-15 NOTE — ASSESSMENT & PLAN NOTE
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 considering history of breast cancer   - 11/11/19 CT Head- "No acute intracranial abnormality  Microangiopathic changes  "  - 11/12/19 CTA Head/Neck- "Subtle area of high density in the undersurface of the posterior right temporal lobe  Minor reactive prominence of blood vessels in this region  See contemporary MR  No large vessel flow restrictive disease within the head or neck despite diffuse, minor atherosclerotic changes  "  - 11/12/19 MRI brain- "Cortical thickening in the undersurface of the right posterior temporal lobe extending to the occipital lobe  No vasogenic edema  Minor parenchymal enhancement and minor focal diffusion restriction  Concern for neoplasm, to include lymphoma  Lumbar   puncture is suggested  "  - CT chest abdomen pelvis negative for source of malignancy  - IR LP performed yesterday    - CSF glucose and WBC count within normal limits    -f/u cytology, leukemia/lymphoma cytometry, HSV PCR, gram stain   - Plan for possible biopsy by Neurosurgery if LP results are inconclusive   - Per neurology and oncology, avoid steroids unless absolutely necessary   - DVT ppx started yesterday following LP   - Neuro checks every 4 hours

## 2019-11-15 NOTE — ASSESSMENT & PLAN NOTE
Lab Results   Component Value Date    HGBA1C 6 5 (H) 11/11/2019       Recent Labs     11/15/19  0032 11/15/19  0402 11/15/19  0532 11/15/19  0724   POCGLU 69 99 72 128       Blood Sugar Average: Last 72 hrs:  (P) 92 [id number]     - Overnight, episodes of asymptomatic hypoglycemia, given 3 amps of D5 with blood glucose response and started on continuous D5 75 mL/hr  - Suspect likely hypermetabolic state in the setting of seizures   - Continue to monitor

## 2019-11-15 NOTE — ASSESSMENT & PLAN NOTE
- Likely secondary to CNS mass as above, abnormal EEG concerning for periodic electrographic seizures noted, transferred for extended EMU monitoring   - EMU order/consult placed and case discussed with epileptologist    - On arrival, given 1500 Kepra IV bolus x2, Maximized on 1750mg BID   - 2 mg Ativan    - Added Lacosamide 400 mg bolus, 200 mg BID   - Given Valproate 20mg/kg bolus, loaded with 500 mg IV, and 300 mg q8, monitoring levels   - Seizure and aspiration precautions

## 2019-11-15 NOTE — ASSESSMENT & PLAN NOTE
Lab Results   Component Value Date    HGBA1C 6 5 (H) 11/11/2019       Recent Labs     11/15/19  0032 11/15/19  0402 11/15/19  0532 11/15/19  0724   POCGLU 69 99 72 128       Blood Sugar Average: Last 72 hrs:  (P) 92 [id number]     Medical management per primary team

## 2019-11-15 NOTE — ASSESSMENT & PLAN NOTE
· Neurology following, appreciate input   · Currently on EEG, concerning for periodic seizures  · Continue AED management per neurology recommendations - currently on Keppra 1750mg BID and depacon

## 2019-11-15 NOTE — ASSESSMENT & PLAN NOTE
Presented with AMS in the setting of known breast cancer    Imaging reviewed personally and with attending, results are as follows:  · MRI brain w wo contrast 11/14/2019:  Cortical thickening in the undersurface of the right posterior temporal lobe extending to the occipital lobe  No vasogenic edema  Minor parenchymal enhancement and minor focal diffusion restriction  Concern for neoplasm, to include lymphoma  · CT chest abdomen pelvis 11/13/2019:  No findings that carry high suspicion for malignancy  Plan:  · Repeat CT head STAT if GCS declines more than 2 points in one hour  · Medical oncology following, appreciate input, recommend against steroids in the event this is lymphoma, would like biopsy to confirm pathology  · Reviewed imaging with attending this morning, likely low yield for biopsy given size of presumed lesion, may be more risk than benefit to pursue, would recommend MRI brain in 4 weeks time for further evaluation  · Continue to hold steroids  · Medical management and pain control per primary team - recommend close monitoring for airway management in the setting of lethargy and possible need for intubation should she decline  · DVT ppx:  SCDs, heparin  · Mobilize as tolerated with assistance, PT / OT evaluation    Neurosurgery will follow as needed during hospitalization  Recommend medical optimization in the setting of continued seizure activity and AED management  Imaging does not fully explain why patient is having continued seizures and poor neuro exam   May be more realistic to follow up in outpatient setting in about 4 weeks with repeat MRI  Signed off for now, please call with questions or concerns

## 2019-11-15 NOTE — PROGRESS NOTES
Progress Note - Nicolette Britt 1945, 69 y o  female MRN: [id number]    Unit/Bed#: [facility] 714-01 Encounter: [id number]    Primary Care Provider: Douglas Fuller MD   Date and time admitted to hospital: 11/13/2019 10:19 PM        * CNS mass  Assessment & Plan  Presented with AMS in the setting of known breast cancer    Imaging reviewed personally and with attending, results are as follows:  · MRI brain w wo contrast 11/14/2019:  Cortical thickening in the undersurface of the right posterior temporal lobe extending to the occipital lobe  No vasogenic edema  Minor parenchymal enhancement and minor focal diffusion restriction  Concern for neoplasm, to include lymphoma  · CT chest abdomen pelvis 11/13/2019:  No findings that carry high suspicion for malignancy  Plan:  · Repeat CT head STAT if GCS declines more than 2 points in one hour  · Medical oncology following, appreciate input, recommend against steroids in the event this is lymphoma, would like biopsy to confirm pathology  · Reviewed imaging with attending this morning, likely low yield for biopsy given size of presumed lesion, may be more risk than benefit to pursue, could consider MRI brain in 4 weeks time to evaluate increase in size of mass and higher yield possibility  · Continue to hold steroids  · Medical management and pain control per primary team - recommend close monitoring for airway management in the setting of lethargy and possible need for intubation should she decline  · DVT ppx:  SCDs, heparin  · Mobilize as tolerated with assistance, PT / OT evaluation    Neurosurgery will follow as needed during hospitalization  Recommend medical optimization in the setting of continued seizure activity and AED management  Imaging does not fully explain why patient is having continued seizures and poor neuro exam   May be more realistic to follow up in outpatient setting in about 4 weeks with repeat MRI    No emergent NSX intervention anticipated at this juncture  Signed off, please call with questions or concerns  Seizures ([address] )  Assessment & Plan  · Neurology following, appreciate input   · Currently on EEG, concerning for periodic seizures  · Continue AED management per neurology recommendations - currently on Keppra 1750mg BID and depacon    Visual-spatial impairment  Assessment & Plan  Recommend ophtho consult for eval, unable to fully assess patient given current mental status    Altered mental status  Assessment & Plan  See plan above    Essential hypertension  Assessment & Plan  · Currently on lisinopril, continue medical management  · SBP < 160    Controlled type 2 diabetes mellitus without complication, without long-term current use of insulin 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    Subjective: patient altered unable to obtain reliable ROS  Objective: laying in bed, EEG in place, lethargic, NAD    I/O       11/13 0701 - 11/14 0700 11/14 0701 - 11/15 0700 11/15 0701 - 11/16 0700    P  O   0 0    I V  (mL/kg) 323 3 (5 2) 1971 7 (32)     IV Piggyback  350     Total Intake(mL/kg) 323 3 (5 2) 2321 7 (37 7) 0 (0)    Urine (mL/kg/hr) 450 2478 (1 7)     Total Output 450 2478     Net -126 7 -156  3 0                 Invasive Devices     Peripheral Intravenous Line            Peripheral IV 11/14/19 Right Hand 1 day          Drain            Urethral Catheter Non-latex 16 Fr  less than 1 day                Physical Exam:  Vitals: Blood pressure 156/96, pulse (!) 51, temperature 99 1 °F (37 3 °C), resp   rate 18, height 4' 11" (1 499 m), weight 61 6 kg (135 lb 12 9 oz), last menstrual period 05/29/1998, SpO2 93 %, not currently breastfeeding  ,Body mass index is 27 43 kg/m²        General appearance: lethargic, difficult to arouse, continues to fall asleep during examination, appears stated age, no distress  Head: Normocephalic, without obvious abnormality, atraumatic, EEG leads in place  Eyes: opens eyes very briefly to voice and is eventually able to keep open, appears to be tracking somewhat, PERRL  Neck: supple, symmetrical, trachea midline  Lungs: non labored breathing  Heart: regular heart rate  Neurologic:   Mental status: GCS 13 (E3, V4, M6), able to somewhat follow commands x 3, opens eyes to voice and after some stimulation is about to keep eyes open during exam for a short period of time, answers some orientation questions but very garbled speech, states "I'm in bed in the middle of the floor" when asked where she is  Cranial nerves: grossly intact (Cranial nerves II-XII)  Motor: generalized weakness throughout, at least a 3/5 throughout  Reflexes: 2+ and symmetric, no trevino or clonus noted      Lab Results:  Results from last 7 days   Lab Units 11/15/19  0525 11/14/19  1804 11/14/19  0849 11/14/19  0650 11/13/19  0620   WBC Thousand/uL 10 14  --   --  10 29* 8 10   HEMOGLOBIN g/dL 13 8  --   --  14 0 14 2   HEMATOCRIT % 43 4  --   --  43 1 46 1   PLATELETS Thousands/uL 171 143* 167 176 174   NEUTROS PCT % 63  --   --  65 58   MONOS PCT % 11  --   --  12 13*     Results from last 7 days   Lab Units 11/15/19  0525 11/14/19  0650 11/13/19  0620   POTASSIUM mmol/L 3 9 4 0 3 9   CHLORIDE mmol/L 110* 108 104   CO2 mmol/L 22 22 22   BUN mg/dL 11 9 8   CREATININE mg/dL 0 71 0 79 0 78   CALCIUM mg/dL 8 5 8 8 8 2*   ALK PHOS U/L 95 94 99   ALT U/L 21 24 22   AST U/L 19 26 25     Results from last 7 days   Lab Units 11/15/19  0525 11/14/19  0650 11/13/19  0620   MAGNESIUM mg/dL 1 9 2 0 1 7     Results from last 7 days   Lab Units 11/15/19  0525 11/14/19  0650 11/13/19  0620   PHOSPHORUS mg/dL 2 5 2 9 2 1*     Results from last 7 days   Lab Units 11/14/19  0849 11/11/19  0950   INR  1 10 0 96   PTT seconds  --  24*     ABG:  Lab Results   Component Value Date    PHART 7 381 11/14/2019    MMC2WTR 34 3 (L) 11/14/2019    PO2ART 64 0 (L) 11/14/2019    AMU1JHG 19 9 (L) 11/14/2019    BEART -4 4 11/14/2019    SOURCE RESULTS FROM SPECIMEN COLLECTED 11/14/19 @ 0141 11/14/2019       Imaging Studies: I have personally reviewed pertinent reports  and I have personally reviewed pertinent films in PACS    Ct Head Wo Contrast    Result Date: 11/13/2019  Impression: No acute intracranial abnormality  Workstation performed: [id number]     Ct Chest Abdomen Pelvis W Contrast    Result Date: 11/13/2019  Impression: There are no findings that carry high suspicion for malignancy in the chest, abdomen, and pelvis  There is a 5 mm groundglass density right upper lobe pulmonary nodule (series 2 image 22 ) Based on current Fleischner Society 2017 Guidelines on incidental pulmonary nodule, patients with a known malignancy are at increased risk of metastasis and should  receive initial three month follow-up chest CT  Workstation performed: [id number]     Fl Lumbar Puncture    Result Date: 11/14/2019  Impression: Successful fluoroscopically guided lumbar puncture utilizing Biofire technique  Procedure was performed by KENYATTA Anguiano PA-C under the direct supervision of Dr Kassandra Treviño performed: [id number]     EKG, Pathology, and Other Studies: I have personally reviewed pertinent reports        VTE Pharmacologic Prophylaxis: Heparin    VTE Mechanical Prophylaxis: sequential compression device

## 2019-11-15 NOTE — NUTRITION
11/14/19 1324   Recommendations/Interventions   Interventions Diet: continued as ordered; Supplement initiate  (Spoke with PCA - stated pt too sleepy to eat earlier today  Will continue to check as pt now sleepy for supper   No RD protocol so cannot add Glucerna BID as po supplement  )   Nutrition Recommendations Other (specify)  (Suggest Re-order diet and give RD protocol to add Glucerna BID as po supplement due to pt's limited po intake thus far today with lethargy  )

## 2019-11-15 NOTE — DISCHARGE SUMMARY
[facility] Discharge Summary - Medical Katy Kenny 69 y o  female MRN: [id number]    [address] Room / Bed: [address] [facility] 402/[address] [place] Encounter: [id number]    BRIEF OVERVIEW  Admitting Provider: Branden Hodge MD  Discharge Provider: Branden Hodge MD    Discharge To:  [facility] medical-surgical floor                                   Epilepsy monitoring unit    Outpatient Follow-Up:   F/U at [facility] for Transition of Care Appt after d/c from [facility]  F/U heme/onco  F/U neurology    Things to address at first follow up visit:   To be determined after discharge from [facility] and results pending at discharge:  Needs LP    Admission Date: 11/11/2019     Discharge Date: 11/13/2019  9:30 PM    Discharge Diagnoses:  Principal Problem:    Disorientation  Active Problems:    Controlled type 2 diabetes mellitus without complication, without long-term current use of insulin ([address] )    Essential hypertension    Total bilirubin, elevated    CNS mass    Altered mental status  Resolved Problems:    Hypertensive urgency    Consulting Providers  Neurology  Hematology/oncology    [address] STAY  Procedures Performed/Pertinent Test results  Ct Head Wo Contrast     Result Date: 11/13/2019  Impression: No acute intracranial abnormality  Ct Chest Abdomen Pelvis W Contrast        Result Date: 11/13/2019     Impression: There are no findings that carry high suspicion for malignancy in the chest, abdomen, and pelvis  There is a 5 mm groundglass density right upper lobe pulmonary nodule (series 2 image 22 ) Based on current Fleischner Society 2017 Guidelines on incidental pulmonary nodule, patients with a known malignancy are at increased risk of metastasis and should  receive initial three month follow-up chest CT     11/12/19: MRI Brain: Cortical thickening in the undersurface of the right posterior temporal lobe extending to the occipital lobe  No vasogenic edema  Minor parenchymal enhancement and minor focal diffusion restriction  Concern for neoplasm, to include lymphoma  Lumbar   puncture is suggested  11/12/19 EEG: Routine EEG recorded during brief wakefulness, drowsiness, and sleep is abnormal due to presence of frequent right hemisphere frontotemporal, occipital region frequent epileptiform discharges and periodic non convulsive electrographic seizures eminating from this region  Clinical correlation is recommended  HPI  Copied from H&P-  69 y o  female with past medical history significant for history of breast cancer, hypertension and non-insulin dependent type 2 DM presenting as a transfer from [facility] for neurosurgical evaluation and EMU monitoring  She initially presented for altered mental status and confusion, and after further work-up a new lesion was discovered on MRI brain, as well as EEG findings concerning for electrographic seizures  She was being followed by neurology and oncology at [facility], who recommended transfer for possible biopsy of brain neoplasm  On arrival, the patient is very somnolent as outlined below in physical exam, and history was not able to be obtained directly from patient, history from chart review and sign out  Per nursing, patient is now incontinent of stool and urine  Hospital Course  * Disorientation  Assessment & Plan  Patient was found confused in the morning, more sleepy than normal  - No hypoglycemia or hyperglycemia with anion gap metabolic acidosis  - CT head & CXR were unremarkable   - 100 ml/hr IV NS infusion for fluid hydration, as patient's CBC may indicate hemoconcentration from dehydration  - repeat UA with UCx to r/o UTI, as she has a history of urinary incontinence, and the initial UA showed innumerable mucus threads; patient is now also running a low fever   No leukocytosis or lactic acidosis (1 8)  - Blood Cx pending   - Procalcitonin - normal   - PT/OT - both recommend outpatient therapies  - Pt more agitated and disoriented on day 2  Patient also endorsed persistent oscillating/double vision that started 3-4 days ago  Neuro Dr Frieda Fabian consulted  MRI & CTA Brain revealed findings suggestive of metastases or lymphoma to the R temperal-occipital region  · Lumbar puncture needed  · CT Chest / Abd / Pelvis - negative for masses appearing as malignancies  · Heme/onc consulted - see note  · EEG & AEDs held:  Eeg positive for recurrent seizures/spikes - per Neurology needs to be transported to an epilepsy monitoring unit    Hypertensive urgencyresolved as of 11/13/2019  Assessment & Plan  Upon arrival, pt's BP was elevated at critical range of 206/84  Blood pressure has been difficult to control  - Will continue to monitor  - Continue patient's daily home BP med lisinopril now at increased dose of 40 mg daily  - hydralazine p r n  Available  - As per Dr Frieda Fabian, keep patient normotensive  Will consider adding additional antihypertensives if necessary     Altered mental status  Assessment & Plan  See disorientation section    CNS mass  Assessment & Plan  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   - being evaluated by Neurology, and Hematology/Oncology  - mass warrants a brain biopsy from Neurosurgery at the [facility]    Total bilirubin, elevated  Assessment & Plan  Patient has an elevated total bilirubin of 1 1 on admission accompanied with elevated Alk Phos of 126  May indicate obstructive biliary pathology that may be affecting her mentation   - Direct Bili and GGT normal; most like elevated total bilirubin from hemolysis     - RUQ U/S - cholelithiasis without cholecystitis   - assess as outpatient after other testing has been evaluated    Essential hypertension  Assessment & Plan  See Hypertensive Urgency     Controlled type 2 diabetes mellitus without complication, without long-term current use of insulin [facility])  Assessment & Plan  Lab Results Component Value Date    HGBA1C 6 5 (H) 11/11/2019       Recent Labs     11/12/19  2106 11/13/19  0703 11/13/19  1124 11/13/19  1642   POCGLU 110 90 169* 80       Blood Sugar Average: Last 72 hrs:  (P) 233 [id number]   A1c well controlled at 6 5  - On Diabetic Diet   - ISS 1-5 U, titrate as needed  - home metformin being held due to testing    Physical Exam at Discharge  Vitals:    11/13/19 2100   BP:    Pulse:    Resp:    Temp:    SpO2: 94%   General appearance: alert, cooperative and no distress  Head: Normocephalic, without obvious abnormality, atraumatic, c/o HA  Lungs: clear to auscultation bilaterally  Heart: regular rate and rhythm, S1, S2 normal, no murmur, click, rub or gallop  Abdomen: soft, non-tender; bowel sounds normal; no masses,  no organomegaly  Neurologic:  Awake, but disoriented, with increased confusion and short-term memory loss  Patient able to ambulate  Visual field abnormal for left upper quadrantanopia  Medications   Copied from the [employer] continued at transfer    Allergies  Allergies   Allergen Reactions    Percocet [Oxycodone-Acetaminophen]      Patient tolerated Tylenol in the past in 2016     Diet restrictions: none  Activity restrictions: Fall risk  Code Status: Level 1 - Full Code    Discharge Condition: serious    Discharge  Statement   I spent 30 minutes discharging the patient  This time was spent on the day of discharge  I had direct contact with the patient on the day of discharge  Additional documentation is required if more than 30 minutes were spent on discharge

## 2019-11-15 NOTE — PLAN OF CARE
Problem: Potential for Falls  Goal: Patient will remain free of falls  Description  INTERVENTIONS:  - Assess patient frequently for physical needs  -  Identify cognitive and physical deficits and behaviors that affect risk of falls    -  [place] fall precautions as indicated by assessment   - Educate patient/family on patient safety including physical limitations  - Instruct patient to call for assistance with activity based on assessment  - Modify environment to reduce risk of injury  - Consider OT/PT consult to assist with strengthening/mobility  Outcome: Progressing

## 2019-11-15 NOTE — OCCUPATIONAL THERAPY NOTE
Occupational Therapy         Patient Name: Sarah Romeo  ZJJZN'O Date: 11/15/2019      OT orders received and chart reviewed - RN request to defer OT intervention at this time 2* decreased medical status - will continue to follow and initiate OT eval as appropriate    Joana Valdes, OT

## 2019-11-16 PROBLEM — G40.901 SUBCLINICAL STATUS EPILEPTICUS (HCC): Status: ACTIVE | Noted: 2019-01-01

## 2019-11-16 NOTE — ASSESSMENT & PLAN NOTE
Lab Results   Component Value Date    HGBA1C 6 5 (H) 11/11/2019       Recent Labs     11/15/19  1801 11/15/19  2359 11/16/19  0534 11/16/19  1211   POCGLU 127 155* 130 108       Blood Sugar Average: Last 72 hrs:  (P) 102 [id number]     - Overnight, episodes of asymptomatic hypoglycemia  - Suspect likely hypermetabolic state in the setting of seizures   -continue IV normal saline D10 at 75 mL per  - Continue to monitor

## 2019-11-16 NOTE — RESPIRATORY THERAPY NOTE
RT Protocol Note  Sowmya Klein 69 y o  female MRN: [id number]  Unit/Bed#: ICU 14 Encounter: [id number]    Assessment    Principal Problem:    Subclinical status epilepticus ([place] [address] )  Active Problems:    Controlled type 2 diabetes mellitus without complication, without long-term current use of insulin (HCC)    Essential hypertension    CNS mass    Altered mental status    Visual-spatial impairment    Seizures (HCC)      Home Pulmonary Medications:  Albuterol mdi prn       Past Medical History:   Diagnosis Date    Bacterial pneumonia     Last Assessed: 11/14/2013    Breast cancer ([place] [address] )     hx    Diabetes mellitus ([place] [address] )     Hyperlipidemia     Hypertension      Social History     Socioeconomic History    Marital status: Single     Spouse name: Not on file    Number of children: Not on file    Years of education: Not on file    Highest education level: Not on file   Occupational History    Not on file   Social Needs    Financial resource strain: Not on file    Food insecurity:     Worry: Not on file     Inability: Not on file    Transportation needs:     Medical: Not on file     Non-medical: Not on file   Tobacco Use    Smoking status: Current Every Day Smoker     Packs/day: 1 00    Smokeless tobacco: Current User   Substance and Sexual Activity    Alcohol use: Not Currently    Drug use: Not Currently    Sexual activity: Not Currently   Lifestyle    Physical activity:     Days per week: Not on file     Minutes per session: Not on file    Stress: Not on file   Relationships    Social connections:     Talks on phone: Not on file     Gets together: Not on file     Attends Judaism service: Not on file     Active member of club or organization: Not on file     Attends meetings of clubs or organizations: Not on file     Relationship status: Not on file    Intimate partner violence:     Fear of current or ex partner: Not on file     Emotionally abused: Not on file     Physically abused: Not on file Forced sexual activity: Not on file   Other Topics Concern    Not on file   Social History Narrative    Not on file       Subjective    Subjective Data: pt currently in no resp distress  Pt slightly confused, unable to give appropriate answers  BS diminished, SpO2 96% on room air  No resp UDN indicated at this time  ABG drawn  D/C resp protocol and previously ordered UDN tx's    Objective    Physical Exam:   Assessment Type: Assess only  General Appearance: Sedated  Respiratory Pattern: Normal  Chest Assessment: Chest expansion symmetrical  Bilateral Breath Sounds: Clear, Diminished    Vitals:  Blood pressure 140/71, pulse 67, temperature 99 4 °F (37 4 °C), resp  rate 18, height 4' 11" (1 499 m), weight 61 6 kg (135 lb 12 9 oz), last menstrual period 05/29/1998, SpO2 97 %, not currently breastfeeding  Results from last 7 days   Lab Units 11/14/19  0506   PH ART  7 381   PCO2 ART mm Hg 34 3*   PO2 ART mm Hg 64 0*   HCO3 ART mmol/L 19 9*   BASE EXC ART mmol/L -4 4   O2 CONTENT ART mL/dL 18 6*   O2 HGB, ARTERIAL % 91 8*   ABG SOURCE  RESULTS FROM SPECIMEN COLLECTED 11/14/19 @ 0141       Imaging and other studies: I have personally reviewed pertinent reports  O2 Device: room air     Plan    Respiratory Plan: Vent/NIV/HFNC        Resp Comments: Pt intubated with 7 5 22 at lip  LDA not entered at this time

## 2019-11-16 NOTE — RESPIRATORY THERAPY NOTE
RT Ventilator Management Note  Canton Flow 69 y o  female MRN: [id number]  Unit/Bed#: ICU 14 Encounter: [id number]      Daily Screen       11/16/2019  5773             Patient safety screen outcome[de-identified]  Failed            Physical Exam:   Assessment Type: Assess only  General Appearance: Sedated  Respiratory Pattern: Normal  Chest Assessment: Chest expansion symmetrical  Bilateral Breath Sounds: Clear, Diminished      Resp Comments: Pt intubated with 7 5 22 at lip  LDA not entered at this time

## 2019-11-16 NOTE — PROGRESS NOTES
Progress Note - Dallas Nicolas 1945, 69 y o  female MRN: [id number]    Unit/Bed#: [facility] 839-65 Encounter: [id number]    Primary Care Provider: Stacey Holt MD   Date and time admitted to hospital: 11/13/2019 10:19 PM        Seizures ([address] )  Assessment & Plan  - Abnormal EEG concerning for periodic electrographic seizures noted, transferred for extended EMU monitoring   -currently on Vimpat 200 mg IV b i d , Keppra 17 50 mg b i d , valproate 300 mg q 8    Patient continues to have seizures, per Neurology transfer to ICU for burst suppression    Altered mental status  Assessment & Plan  - Patient has been agitated and pulling out IV lines, requiring mitts   - More alert today but will not follow commands   - Patient is bradycardic during sleep, but in normal sinus rhythm   - patient continuing to have seizures,  per Neurology patient to be transfer to ICU for burst suppression  -follow-up on leukemia/lymphoma cytometric, CSF studies, HSV PCR    Essential hypertension  Assessment & Plan  - Blood pressure well-controlled, per neurology plan for normotension   - Continue lisinopril 40 mg daily     Controlled type 2 diabetes mellitus without complication, without long-term current use of insulin [facility])  Assessment & Plan  Lab Results   Component Value Date    HGBA1C 6 5 (H) 11/11/2019       Recent Labs     11/15/19  1801 11/15/19  2359 11/16/19  0534 11/16/19  1211   POCGLU 127 155* 130 108       Blood Sugar Average: Last 72 hrs:  (P) 102 [id number]     - Overnight, episodes of asymptomatic hypoglycemia  - Suspect likely hypermetabolic state in the setting of seizures   -continue IV normal saline D10 at 75 mL per  - Continue to monitor     * CNS mass  Assessment & Plan  - Transferred to [facility] for neurosurgical evaluation, possible biopsy of mass   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 considering history of breast cancer   - 11/11/19 CT Head- "No acute intracranial abnormality  Microangiopathic changes  "  - 11/12/19 CTA Head/Neck- "Subtle area of high density in the undersurface of the posterior right temporal lobe  Minor reactive prominence of blood vessels in this region  See contemporary MR  No large vessel flow restrictive disease within the head or neck despite diffuse, minor atherosclerotic changes  "  - 11/12/19 MRI brain- "Cortical thickening in the undersurface of the right posterior temporal lobe extending to the occipital lobe  No vasogenic edema  Minor parenchymal enhancement and minor focal diffusion restriction  Concern for neoplasm, to include lymphoma  Lumbar   puncture is suggested  "  - CT chest abdomen pelvis negative for source of malignancy  - IR LP performed   - CSF glucose and WBC count within normal limits    -f/u cytology, leukemia/lymphoma cytometry, HSV PCR  - per Neurosurgery,low yield for biopsy given size of presumed lesion, may be more risk than benefit to pursue, could consider MRI brain in 4 weeks   - Per neurology and oncology, avoid steroids unless absolutely necessary   - DVT ppx started yesterday following LP   - Neuro checks every 4 hours   -patient continues to have seizures, per Neurology transfer to ICU for burst suppression        Diet:  Level 2 dysphagia diet  DVT ppx:  Heparin  Code status:  Full code    Dispo:  Per Neurology transfer to ICU  Plan discussed with Dr Porter and [facility] Team    Subjective:   Patient seen and examined at bedside, appears alert, but does not follow commands  Per chart review, patient continues to have seizures, which have increased in duration and prevalence    Received IV Ativan for seizure    Objective:     Vitals:  Vitals:    11/16/19 0414 11/16/19 0739 11/16/19 0914 11/16/19 1117   BP: 142/64 164/79 122/66 (!) 175/92   Pulse: (!) 45 60 64 72   Resp: 18 18  18   Temp: (!) 97 1 °F (36 2 °C) (!) 97 °F (36 1 °C)     TempSrc:       SpO2: 99% 93% 97% 94% Weight:       Height:             Intake/Output Summary (Last 24 hours) at 11/16/2019 1445  Last data filed at 11/16/2019 1226  Gross per 24 hour   Intake 0 ml   Output 1650 ml   Net -1650 ml       Invasive Devices     Peripheral Intravenous Line            Peripheral IV 11/15/19 Right Antecubital 1 day    Peripheral IV 11/15/19 Right Forearm 1 day          Drain            Urethral Catheter Non-latex 16 Fr  1 day                  Labs:   Admission on 11/13/2019   Component Date Value    CSF Culture 11/14/2019 No growth     Appearance, CSF 11/14/2019 clear     Tube Number, CSF 11/14/2019 4     WBC, CSF 11/14/2019 2     Xanthochromia 11/14/2019 No     Glucose, CSF 11/14/2019 66     Protein, CSF 11/14/2019 34     POC Glucose 11/13/2019 111     WBC 11/14/2019 10 29*    RBC 11/14/2019 4 53     Hemoglobin 11/14/2019 14 0     Hematocrit 11/14/2019 43 1     MCV 11/14/2019 95     MCH 11/14/2019 30 9     MCHC 11/14/2019 32 5     RDW 11/14/2019 15 1     MPV 11/14/2019 10 4     Platelets 28/05/8213 176     nRBC 11/14/2019 0     Neutrophils Relative 11/14/2019 65     Immat GRANS % 11/14/2019 0     Lymphocytes Relative 11/14/2019 23     Monocytes Relative 11/14/2019 12     Eosinophils Relative 11/14/2019 0     Basophils Relative 11/14/2019 0     Neutrophils Absolute 11/14/2019 6 64     Immature Grans Absolute 11/14/2019 0 04     Lymphocytes Absolute 11/14/2019 2 36     Monocytes Absolute 11/14/2019 1 20     Eosinophils Absolute 11/14/2019 0 02     Basophils Absolute 11/14/2019 0 03     Sodium 11/14/2019 138     Potassium 11/14/2019 4 0     Chloride 11/14/2019 108     CO2 11/14/2019 22     ANION GAP 11/14/2019 8     BUN 11/14/2019 9     Creatinine 11/14/2019 0 79     Glucose 11/14/2019 87     Calcium 11/14/2019 8 8     AST 11/14/2019 26     ALT 11/14/2019 24     Alkaline Phosphatase 11/14/2019 94     Total Protein 11/14/2019 6 7     Albumin 11/14/2019 3 4*    Total Bilirubin 11/14/2019 1 29*    eGFR 11/14/2019 75     Magnesium 11/14/2019 2 0     Phosphorus 11/14/2019 2 9     pH, Arterial 11/14/2019 7  381     pCO2, Arterial 11/14/2019 34 3*    pO2, Arterial 11/14/2019 64 0*    HCO3, Arterial 11/14/2019 19 9*    Base Excess, Arterial 11/14/2019 -4 4     O2 Content, Arterial 11/14/2019 18 6*    O2 HGB,Arterial  11/14/2019 91 8*    SOURCE 11/14/2019 RESULTS FROM SPECIMEN COLLECTED 11/14/19 @ 0141     POC Glucose 11/14/2019 89     POC Glucose 11/14/2019 97     Protime 11/14/2019 13 8     INR 11/14/2019 1 10     Platelets 56/03/0343 167     MPV 11/14/2019 10 3     POC Glucose 11/14/2019 104     Total Counted 11/14/2019 75     Neutrophils % (CSF) 11/14/2019 21     Lymphs % CSF 11/14/2019 36     Monocytes % (CSF) 11/14/2019 43     Valproic Acid, Total 11/14/2019 133*    Platelets 80/32/2822 143*    MPV 11/14/2019 10 2     POC Glucose 11/14/2019 64*    POC Glucose 11/14/2019 112     POC Glucose 11/14/2019 76     POC Glucose 11/15/2019 69     WBC 11/15/2019 10 14     RBC 11/15/2019 4 51     Hemoglobin 11/15/2019 13 8     Hematocrit 11/15/2019 43 4     MCV 11/15/2019 96     MCH 11/15/2019 30 6     MCHC 11/15/2019 31 8     RDW 11/15/2019 14 9     MPV 11/15/2019 10 1     Platelets 41/87/8407 171     nRBC 11/15/2019 0     Neutrophils Relative 11/15/2019 63     Immat GRANS % 11/15/2019 0     Lymphocytes Relative 11/15/2019 25     Monocytes Relative 11/15/2019 11     Eosinophils Relative 11/15/2019 1     Basophils Relative 11/15/2019 0     Neutrophils Absolute 11/15/2019 6 32     Immature Grans Absolute 11/15/2019 0 03     Lymphocytes Absolute 11/15/2019 2 54     Monocytes Absolute 11/15/2019 1 10     Eosinophils Absolute 11/15/2019 0 11     Basophils Absolute 11/15/2019 0 04     Sodium 11/15/2019 142     Potassium 11/15/2019 3 9     Chloride 11/15/2019 110*    CO2 11/15/2019 22     ANION GAP 11/15/2019 10     BUN 11/15/2019 11     Creatinine 11/15/2019 0 71     Glucose 11/15/2019 104     Calcium 11/15/2019 8 5     AST 11/15/2019 19     ALT 11/15/2019 21     Alkaline Phosphatase 11/15/2019 95     Total Protein 11/15/2019 6 7     Albumin 11/15/2019 3 2*    Total Bilirubin 11/15/2019 1 20*    eGFR 11/15/2019 85     Magnesium 11/15/2019 1 9     Phosphorus 11/15/2019 2 5     POC Glucose 11/15/2019 99     POC Glucose 11/15/2019 72     POC Glucose 11/15/2019 128     POC Glucose 11/15/2019 64*    POC Glucose 11/15/2019 132     POC Glucose 11/15/2019 127     POC Glucose 11/15/2019 155*    WBC 11/16/2019 7 77     RBC 11/16/2019 4 70     Hemoglobin 11/16/2019 14 5     Hematocrit 11/16/2019 45 0     MCV 11/16/2019 96     MCH 11/16/2019 30 9     MCHC 11/16/2019 32 2     RDW 11/16/2019 14 8     MPV 11/16/2019 10 3     Platelets 41/96/3247 158     nRBC 11/16/2019 0     Neutrophils Relative 11/16/2019 55     Immat GRANS % 11/16/2019 0     Lymphocytes Relative 11/16/2019 29     Monocytes Relative 11/16/2019 12     Eosinophils Relative 11/16/2019 4     Basophils Relative 11/16/2019 0     Neutrophils Absolute 11/16/2019 4 25     Immature Grans Absolute 11/16/2019 0 01     Lymphocytes Absolute 11/16/2019 2 24     Monocytes Absolute 11/16/2019 0 96     Eosinophils Absolute 11/16/2019 0 28     Basophils Absolute 11/16/2019 0 03     Sodium 11/16/2019 142     Potassium 11/16/2019 3 9     Chloride 11/16/2019 111*    CO2 11/16/2019 24     ANION GAP 11/16/2019 7     BUN 11/16/2019 8     Creatinine 11/16/2019 0 71     Glucose 11/16/2019 133     Calcium 11/16/2019 8 4     AST 11/16/2019 16     ALT 11/16/2019 18     Alkaline Phosphatase 11/16/2019 86     Total Protein 11/16/2019 6 5     Albumin 11/16/2019 2 8*    Total Bilirubin 11/16/2019 1 28*    eGFR 11/16/2019 85     Magnesium 11/16/2019 2 0     Phosphorus 11/16/2019 2 3     POC Glucose 11/16/2019 130     POC Glucose 11/16/2019 108        Physical Exam Constitutional: No distress  Eyes: Conjunctivae are normal    Cardiovascular: Normal rate and normal heart sounds  Pulmonary/Chest: Effort normal  No respiratory distress  She has no wheezes  Abdominal: Soft  She exhibits no distension  Musculoskeletal: Normal range of motion  Neurological:   Patient appears alert, keeps her eyes open, incoherent response to questions, does not follow commands  Spontaneous movements of arms and legs   Vitals reviewed        Current Facility-Administered Medications   Medication Dose Route Frequency    acetaminophen (TYLENOL) tablet 650 mg  650 mg Oral Q6H PRN    atorvastatin (LIPITOR) tablet 40 mg  40 mg Oral Daily With Dinner    docusate sodium (COLACE) capsule 100 mg  100 mg Oral BID    heparin (porcine) subcutaneous injection 5,000 Units  5,000 Units Subcutaneous Q8H [address]    hydrocortisone 2 5 % ointment   Topical BID    lacosamide (VIMPAT) 200 mg in sodium chloride 0 9 % 100 mL IVPB  200 mg Intravenous Q12H    levETIRAcetam (KEPPRA) 1,750 mg in sodium chloride 0 9 % 250 mL IVPB  1,750 mg Intravenous Q12H [address]    lisinopril (ZESTRIL) tablet 40 mg  40 mg Oral Daily    sodium chloride (concentrated) 154 mEq in dextrose 10 % 1,000 mL infusion  75 mL/hr Intravenous Continuous    valproate (DEPACON) 300 mg in sodium chloride 0 9 % 50 mL IVPB  300 mg Intravenous Q8H [address]     Allergies   Allergen Reactions    Percocet [Oxycodone-Acetaminophen]      Patient tolerated Tylenol in the past in 2016         Imaging/Other:      VTE Pharmacologic Prophylaxis: Heparin  VTE Mechanical Prophylaxis: sequential compression device      Khurram Collier MD  Family Medicine  PGY-3

## 2019-11-16 NOTE — ASSESSMENT & PLAN NOTE
- Abnormal EEG concerning for periodic electrographic seizures noted, transferred for extended EMU monitoring   -currently on Vimpat 200 mg IV b i d , Keppra 17 50 mg b i d , valproate 300 mg q 8    Patient continues to have seizures, per Neurology transfer to ICU for burst suppression

## 2019-11-16 NOTE — PROGRESS NOTES
Accept Note - Shadia Kenny 69 y o  female MRN: [id number]  [facility] Encounter: [id number]     Reason for Admission / Chief Complaint: Status epilepticus     History of Present Illness:  Tesfaye Conway is a 69 y o  female with a history of DM2, HTN, remote history of breast cancer who presents to the MICU as a transfer from the floor for status epilepticus  Per chart, patient initially presented to [facility] with her son for decreased alertness  MRI brain showed R posterior temporal/occipital lobe cortical thickening concerning for neoplasm and EEG showed epileptiform discharged and non-convulsive electrographic seizures  She was transferred to [facility] ED for vEEG monitoring on 11/13  She continued to have periodic electrographic seizures despite multiple anticonvulsants, so decision was made to transfer to the ICU for intubation and burst suppression  Patient is able to answer basic questions but does not provide significant additional history  History obtained from chart review       Past Medical History:  Past Medical History:   Diagnosis Date    Bacterial pneumonia     Last Assessed: 11/14/2013    Breast cancer ([facility] )     hx    Diabetes mellitus ([facility] )     Hyperlipidemia     Hypertension         Past Surgical History:  Past Surgical History:   Procedure Laterality Date    COMPLETE MASTECTOMY W/ SENTINEL NODE BIOPSY Left     Resolved: 6/19/2007    FL LUMBAR PUNCTURE  11/14/2019        Past Family History:  Family History   Problem Relation Age of Onset    Heart disease Mother     Heart disease Father         Social History:  Social History     Tobacco Use   Smoking Status Current Every Day Smoker    Packs/day: 1 00   Smokeless Tobacco Current User     Social History     Substance and Sexual Activity   Alcohol Use Not Currently     Social History     Substance and Sexual Activity   Drug Use Not Currently     Marital Status: Single      Medications:  Current Facility-Administered Medications   Medication Dose Route Frequency    acetaminophen (TYLENOL) tablet 650 mg  650 mg Oral Q6H PRN    atorvastatin (LIPITOR) tablet 40 mg  40 mg Oral Daily With Dinner    chlorhexidine (PERIDEX) 0 12 % oral rinse 15 mL  15 mL Swish & Spit Q12H [address]    docusate sodium (COLACE) capsule 100 mg  100 mg Oral BID    fentanyl citrate (PF) 100 MCG/2ML 100 mcg  100 mcg Intravenous Once    heparin (porcine) subcutaneous injection 5,000 Units  5,000 Units Subcutaneous Q8H [address]    hydrocortisone 2 5 % ointment   Topical BID    lacosamide (VIMPAT) 200 mg in sodium chloride 0 9 % 100 mL IVPB  200 mg Intravenous Q12H    levETIRAcetam (KEPPRA) 1,750 mg in sodium chloride 0 9 % 250 mL IVPB  1,750 mg Intravenous Q12H [address]    lisinopril (ZESTRIL) tablet 40 mg  40 mg Oral Daily    midazolam (VERSED) 0 5 mg/mL (STANDARD CONCENTRATION) 100 mL infusion  10 mg/hr Intravenous Continuous    midazolam (VERSED) injection 12 mg  12 mg Intravenous Once    multi-electrolyte (PLASMALYTE-A/ISOLYTE-S PH 7 4) IV solution  100 mL/hr Intravenous Continuous    sodium chloride (concentrated) 154 mEq in dextrose 10 % 1,000 mL infusion  75 mL/hr Intravenous Continuous    Succinylcholine Chloride 100 mg/5 mL syringe 100 mg  1 5 mg/kg Intravenous Once    valproate (DEPACON) 300 mg in sodium chloride 0 9 % 50 mL IVPB  300 mg Intravenous Q8H [address]     Home medications:  Prior to Admission medications    Medication Sig Start Date End Date Taking? Authorizing Provider   albuterol (PROVENTIL HFA,VENTOLIN HFA) 90 mcg/act inhaler Inhale 1-2 puffs every 6 (six) hours as needed for wheezing  Patient not taking: Reported on 6/6/2019 9/10/18   Danie Teixeira MD   aspirin 81 MG tablet Take 81 mg by mouth daily      Historical Provider, MD   atorvastatin (LIPITOR) 80 mg tablet Take 1 tablet (80 mg total) by mouth daily 7/3/19   Maldonado Ziegler MD   hydrocortisone 2 5 % ointment Apply topically 2 (two) times a day 6/6/19   Alix Suzanne Tellez MD   ipratropium-albuterol (COMBIVENT RESPIMAT) inhaler Inhale 1 puff 2 (two) times a day  Patient not taking: Reported on 10/29/2018  9/10/18   Kristen De Leon MD   lisinopril (ZESTRIL) 20 mg tablet Take 1 tablet (20 mg total) by mouth daily 3/12/19   Leonor Lombardi MD   metFORMIN (GLUCOPHAGE) 500 mg tablet Take 1 tablet (500 mg total) by mouth daily with breakfast 1/10/19   Kristen De Leon MD   nitrofurantoin (MACROBID) 100 mg capsule Take 1 capsule (100 mg total) by mouth 2 (two) times a day  Patient not taking: Reported on 2019   Den Rendon DO   ondansetron (ZOFRAN) 4 mg tablet Take 1 tablet (4 mg total) by mouth every 6 (six) hours 19   Deja Villa DO     Allergies: Allergies   Allergen Reactions    Percocet [Oxycodone-Acetaminophen]      Patient tolerated Tylenol in the past in         ROS:   Review of Systems   Unable to perform ROS: Mental status change        Vitals:  Vitals:    19 0739 19 0914 19 1117 19 1532   BP: 164/79 122/66 (!) 175/92 140/71   Pulse: 60 64 72 67   Resp: 18  18 18   Temp: (!) 97 °F (36 1 °C)   99 4 °F (37 4 °C)   TempSrc:       SpO2: 93% 97% 94% 96%   Weight:       Height:         Temperature:   Temp (24hrs), Av 8 °F (36 6 °C), Min:97 °F (36 1 °C), Max:99 4 °F (37 4 °C)    Current: Temperature: 99 4 °F (37 4 °C)     Weights:   IBW: 43 2 kg  Body mass index is 27 43 kg/m²  Hemodynamic Monitoring:  N/A     Non-Invasive/Invasive Ventilation Settings:  Respiratory    Lab Data (Last 4 hours)    None         O2/Vent Data (Last 4 hours)    None              No results found for: PHART, IBB2IPA, PO2ART, EJL0TMX, V1NWPUVQ, BEART, SOURCE  SpO2: SpO2: 96 %, SpO2 Device: O2 Device: None (Room air)     Physical Exam:  Physical Exam   Constitutional: She appears well-developed and well-nourished  No distress  HENT:   Head: Normocephalic and atraumatic  Eyes: Pupils are equal, round, and reactive to light  Conjunctivae and EOM are normal    Neck: Normal range of motion  Neck supple  Cardiovascular: Normal rate, regular rhythm and normal heart sounds  Pulmonary/Chest: Effort normal and breath sounds normal  No respiratory distress  Abdominal: Soft  Bowel sounds are normal  There is no tenderness  Musculoskeletal: Normal range of motion  She exhibits no edema  Neurological: She is alert  She has normal strength  Verbal  Oriented x2  Does not follow commands  Spontaneous non-purposeful movements of extrem x4  Normal tone, no clonus  No tonic-clonic activity   Skin: Skin is warm, dry and intact  Nursing note and vitals reviewed  Labs:  Results from last 7 days   Lab Units 11/16/19  0651 11/15/19  0525 11/14/19  1804  11/14/19  0650   WBC Thousand/uL 7 77 10 14  --   --  10 29*   HEMOGLOBIN g/dL 14 5 13 8  --   --  14 0   HEMATOCRIT % 45 0 43 4  --   --  43 1   PLATELETS Thousands/uL 158 171 143*   < > 176   NEUTROS PCT % 55 63  --   --  65   MONOS PCT % 12 11  --   --  12    < > = values in this interval not displayed        Results from last 7 days   Lab Units 11/16/19  0651 11/15/19  0525 11/14/19  0650   SODIUM mmol/L 142 142 138   POTASSIUM mmol/L 3 9 3 9 4 0   CHLORIDE mmol/L 111* 110* 108   CO2 mmol/L 24 22 22   BUN mg/dL 8 11 9   CREATININE mg/dL 0 71 0 71 0 79   CALCIUM mg/dL 8 4 8 5 8 8   ALK PHOS U/L 86 95 94   ALT U/L 18 21 24   AST U/L 16 19 26     Results from last 7 days   Lab Units 11/16/19  0651 11/15/19  0525 11/14/19  0650   MAGNESIUM mg/dL 2 0 1 9 2 0     Results from last 7 days   Lab Units 11/16/19  0651 11/15/19  0525 11/14/19  0650   PHOSPHORUS mg/dL 2 3 2 5 2 9      Results from last 7 days   Lab Units 11/14/19  0849 11/11/19  0950   INR  1 10 0 96   PTT seconds  --  24*     Results from last 7 days   Lab Units 11/11/19  0950   LACTIC ACID mmol/L 1 8     0   Lab Value Date/Time    TROPONINI 0 04 01/27/2019 3993        Imaging:   Ct Head Wo Contrast  Result Date: 11/13/2019  Impression: No acute intracranial abnormality  Workstation performed: [id number]     Ct Chest Abdomen Pelvis W Contrast  Result Date: 11/13/2019  Impression: There are no findings that carry high suspicion for malignancy in the chest, abdomen, and pelvis  There is a 5 mm groundglass density right upper lobe pulmonary nodule (series 2 image 22 ) Based on current Fleischner Society 2017 Guidelines on incidental pulmonary nodule, patients with a known malignancy are at increased risk of metastasis and should  receive initial three month follow-up chest CT  Workstation performed: [id number]     Fl Lumbar Puncture  Result Date: 11/14/2019  Impression: Successful fluoroscopically guided lumbar puncture utilizing Biofire technique  Procedure was performed by KENYATTA Faith PA-C under the direct supervision of Dr Chase Kelly performed: [id number]      I have personally reviewed pertinent reports  Micro:  Lab Results   Component Value Date    BLOODCX No Growth After 5 Days  11/11/2019    BLOODCX No Growth After 5 Days  11/11/2019    URINECX No Growth <1000 cfu/mL 11/11/2019       Assessment: Felecia Hughes is a 69 y o  female with a history of DM2, HTN, remote history of breast cancer who presents to the MICU as a transfer from the floor for status epilepticus  Plan:      Neuro:   1  Status epilepticus:   - Transferred to ICU for burst supression   - vEEG   - Epileptology following   - Currently on vimpat 200 mg IV BID, Keppra 1750 mg BID, valproate 300 mg q8h   - Plan for versed gtt   - q1h neuro checks  2  Temporal lobe mass:   - Seen on MRI, concerning for neoplasm   - CT chest no obvious malignancy   - LP with cytology pending   - NSG following, recommend MRI in 4 weeks    - NSG recommends avoiding steroids if possible     CV:   1   History of HTN   - Home lisinopril     Lung:   - Plan for intubation  - Resp protocol     GI:   - NPO  - No indication for gi PPx  - Initiate bowel regimen     FEN:   1  Fluids:   - MIVF  2  Electrolytes:   - Trend and replete as needed  3  Nutrition:   - NPO     :   - Cr 0 71, trend  - Will place angel when intubated     ID:   - No acute abnormality  - Afebrile     Heme:   1  DVT ppx:   - Heparin   - SCDs     Endo:   1  History of DM2   - SSI   - q6h POC glucose while NPO     Msk/Skin:   - PT/OT when appropriate  - Frequent offloading    Disposition: ICU     VTE Pharmacologic Prophylaxis: Heparin  VTE Mechanical Prophylaxis: sequential compression device     Invasive lines and devices: Invasive Devices     Peripheral Intravenous Line            Peripheral IV 11/15/19 Right Antecubital 1 day    Peripheral IV 11/15/19 Right Forearm 1 day          Drain            Urethral Catheter Non-latex 16 Fr  1 day                 Code Status: Level 1 - Full Code  POA:    POLST:       Given critical illness, patient length of stay will require greater than two midnights       Sondra Clarke MD

## 2019-11-16 NOTE — PROCEDURES
Intubation  Date/Time: 11/16/2019 5:38 PM  Performed by: Magalis Gómez MD  Authorized by: Magalis Gómez MD     Consent:     Consent obtained:  Verbal    Consent given by: cirilo  Procedure details:     Preoxygenation:  Bag valve mask    Intubation method:  Oral    Oral intubation technique:  Direct    Laryngoscope blade: Mac 3    Tube size (mm):  7 5    Number of attempts:  1  Placement assessment:     Tube secured with:  ETT meyer    Placement verification: chest rise, condensation, CXR verification, equal breath sounds, ETCO2 detector and tube exhalation      CXR findings:  ETT in proper place  Post-procedure details:     Patient tolerance of procedure:   Tolerated well, no immediate complications

## 2019-11-16 NOTE — PROGRESS NOTES
PATIENT NAME: Suraj Hoang,  YOB: 1945  MEDICAL RECORD NUMBER: [id number]  Neurology Follow up Note     Following patient for: non convulsive status epilepticus     Overnight Events: Continues to have frequent discharges, intermittently organizing into seizures  Subjective: Patient encephalopathic, cannot provide history or ROS  Scheduled Meds:  Current Facility-Administered Medications:  acetaminophen 650 mg Oral Q6H PRN Laura Lugo MD    atorvastatin 40 mg Oral Daily With Ami Guan MD    docusate sodium 100 mg Oral BID Laura Lugo MD    heparin (porcine) 5,000 Units Subcutaneous [facility] Caryn Gupta DO    hydrocortisone  Topical BID Laura Lugo MD    insulin lispro 1-5 Units Subcutaneous Q6H [address], DO    lacosamide (VIMPAT) IVPB 200 mg Intravenous Q12H Katy Godoy DO Last Rate: 200 mg (11/16/19 8484)   levETIRAcetam 1,750 mg Intravenous Q12H [address], DO Last Rate: 1,750 mg (11/16/19 0855)   lisinopril 40 mg Oral Daily Laura Lugo MD    dextrose 10 % and sodium chloride 0 9 % 75 mL/hr Intravenous Continuous Caryn Gupta DO Last Rate: 75 mL/hr (11/16/19 0233)   valproate sodium 300 mg Intravenous [facility] Justin Garnica MD Last Rate: Stopped (11/16/19 0640)     Continuous Infusions:  dextrose 10 % and sodium chloride 0 9 % 75 mL/hr Last Rate: 75 mL/hr (11/16/19 0233)     PRN Meds:   acetaminophen      Objective  /66   Pulse 64   Temp (!) 97 °F (36 1 °C)   Resp 18   Ht 4' 11" (1 499 m)   Wt 61 6 kg (135 lb 12 9 oz)   LMP 05/29/1998   SpO2 97%   BMI 27 43 kg/m²   GEN: Encephalopathic  No respiratory distress but not much response  HEENT: NC/AT  Anicteric  PPC  MMM   CVS: RRR  S1S2  No M/R/G    LUNGS: B/L entry, no wheezes, rhonchi or rales  EXT: B/L pulses, no edema  Mental Status: The patient was stuporous  Cranial Nerves:   I: smell Not tested   II: visual fields Full to threat grossly     Pupils equal, round, reactive to light  III,IV,VI: intermittent left gaze preference, mild  V: limited assessment  VII: Face is symmetric grossly  VIII: no overt nystagmus  XI: Trapezius and SCM strength 5/5 B/L  XII: Tongue midline with no atrophy or fasciculations      Motor Examination:   No focal weakness noted  Reflexes:                   Biceps Brachioradialis Triceps Patella Achilles Plantars   Right          2+            2+                  2+        2+       2+         Down   Left            2+             2+                 2+         2+       2+         Down     Coordination - no clear cut ataxia when she moves but could not cooperate with examination  Gait: currently non ambulatory         Recent Results (from the past 24 hour(s))   Fingerstick Glucose (POCT)    Collection Time: 11/15/19 11:48 AM   Result Value Ref Range    POC Glucose 64 (L) 65 - 140 mg/dl   Fingerstick Glucose (POCT)    Collection Time: 11/15/19  1:06 PM   Result Value Ref Range    POC Glucose 132 65 - 140 mg/dl   Fingerstick Glucose (POCT)    Collection Time: 11/15/19  6:01 PM   Result Value Ref Range    POC Glucose 127 65 - 140 mg/dl   Fingerstick Glucose (POCT)    Collection Time: 11/15/19 11:59 PM   Result Value Ref Range    POC Glucose 155 (H) 65 - 140 mg/dl   Fingerstick Glucose (POCT)    Collection Time: 11/16/19  5:34 AM   Result Value Ref Range    POC Glucose 130 65 - 140 mg/dl   CBC and differential    Collection Time: 11/16/19  6:51 AM   Result Value Ref Range    WBC 7 77 4 31 - 10 16 Thousand/uL    RBC 4 70 3 81 - 5 12 Million/uL    Hemoglobin 14 5 11 5 - 15 4 g/dL    Hematocrit 45 0 34 8 - 46 1 %    MCV 96 82 - 98 fL    MCH 30 9 26 8 - 34 3 pg    MCHC 32 2 31 4 - 37 4 g/dL    RDW 14 8 11 6 - 15 1 %    MPV 10 3 8 9 - 12 7 fL    Platelets 287 451 - 168 Thousands/uL    nRBC 0 /100 WBCs    Neutrophils Relative 55 43 - 75 %    Immat GRANS % 0 0 - 2 %    Lymphocytes Relative 29 14 - 44 %    Monocytes Relative 12 4 - 12 % Eosinophils Relative 4 0 - 6 %    Basophils Relative 0 0 - 1 %    Neutrophils Absolute 4 25 1 85 - 7 62 Thousands/µL    Immature Grans Absolute 0 01 0 00 - 0 20 Thousand/uL    Lymphocytes Absolute 2 24 0 60 - 4 47 Thousands/µL    Monocytes Absolute 0 96 0 17 - 1 22 Thousand/µL    Eosinophils Absolute 0 28 0 00 - 0 61 Thousand/µL    Basophils Absolute 0 03 0 00 - 0 10 Thousands/µL   Comprehensive metabolic panel    Collection Time: 11/16/19  6:51 AM   Result Value Ref Range    Sodium 142 136 - 145 mmol/L    Potassium 3 9 3 5 - 5 3 mmol/L    Chloride 111 (H) 100 - 108 mmol/L    CO2 24 21 - 32 mmol/L    ANION GAP 7 4 - 13 mmol/L    BUN 8 5 - 25 mg/dL    Creatinine 0 71 0 60 - 1 30 mg/dL    Glucose 133 65 - 140 mg/dL    Calcium 8 4 8 3 - 10 1 mg/dL    AST 16 5 - 45 U/L    ALT 18 12 - 78 U/L    Alkaline Phosphatase 86 46 - 116 U/L    Total Protein 6 5 6 4 - 8 2 g/dL    Albumin 2 8 (L) 3 5 - 5 0 g/dL    Total Bilirubin 1 28 (H) 0 20 - 1 00 mg/dL    eGFR 85 ml/min/1 73sq m   Magnesium    Collection Time: 11/16/19  6:51 AM   Result Value Ref Range    Magnesium 2 0 1 6 - 2 6 mg/dL   Phosphorus    Collection Time: 11/16/19  6:51 AM   Result Value Ref Range    Phosphorus 2 3 2 3 - 4 1 mg/dL         A/P  69 y o  female with non convulsive status epilepticus in the setting of right brain possible infiltrative lesion  Given poor responsiveness to AED's and continued intermittent seizures and constant PLED's decision made to transfer to ICU for burst supression  Lesion does enhance in a spot and is in the non dominant hemisphere and is cortical  Unfortunately treating the status without address the underlying lesion may be futile  We would also add steroids typically in this scenario (super refractory status) however this may limit diagnostic yield  Repeating imaging in 4 weeks may not work given the above however we will see  Still waiting on cytometry/cytology however may well need tissue diagnosis and steroids now  Case d/w ICU and neurosurgery, primary team and epilepsy  Will start steroids at this point, burst suppress and go from there  In meanwhile will burst suppress for atleast 24 hours and reassess

## 2019-11-16 NOTE — ASSESSMENT & PLAN NOTE
- Patient has been agitated and pulling out IV lines, requiring mitts   - More alert today but will not follow commands   - Patient is bradycardic during sleep, but in normal sinus rhythm   - patient continuing to have seizures,  per Neurology patient to be transfer to ICU for burst suppression  -follow-up on leukemia/lymphoma cytometric, CSF studies, HSV PCR

## 2019-11-16 NOTE — PLAN OF CARE
Problem: Potential for Falls  Goal: Patient will remain free of falls  Description  INTERVENTIONS:  - Assess patient frequently for physical needs  -  Identify cognitive and physical deficits and behaviors that affect risk of falls    -  [place] fall precautions as indicated by assessment   - Educate patient/family on patient safety including physical limitations  - Instruct patient to call for assistance with activity based on assessment  - Modify environment to reduce risk of injury  - Consider OT/PT consult to assist with strengthening/mobility  Outcome: Progressing

## 2019-11-17 NOTE — ASSESSMENT & PLAN NOTE
Lab Results   Component Value Date    HGBA1C 6 5 (H) 11/11/2019       Recent Labs     11/16/19  0534 11/16/19  1211 11/16/19  1608 11/17/19  0007   POCGLU 130 108 141* 155*       Blood Sugar Average: Last 72 hrs:  (P) 259 [id number]     - Overnight, episodes of asymptomatic hypoglycemia  - Suspect likely hypermetabolic state in the setting of seizures   -continue IV normal saline D10 at 75 mL per  - Continue to monitor

## 2019-11-17 NOTE — RESPIRATORY THERAPY NOTE
RT Ventilator Management Note  Erich Chen 69 y o  female MRN: [id number]  Unit/Bed#: ICU 14 Encounter: [id number]      Daily Screen       11/16/2019  2946             Patient safety screen outcome[de-identified]  Failed            Physical Exam:   Assessment Type: (P) Assess only  General Appearance: (P) Sedated  Respiratory Pattern: (P) Assisted  Chest Assessment: (P) Chest expansion symmetrical  Bilateral Breath Sounds: (P) Diminished, Coarse  R Breath Sounds: Diminished      Resp Comments: (P) Pt resting comfortably on current ac settings, will continue to monitor

## 2019-11-17 NOTE — OCCUPATIONAL THERAPY NOTE
Occupational Therapy Cancellation Note  OT orders received, pt's chart reviewed  Pt is currently sedated and not appropriate for OT evaluation  OT to continue to follow and re-attempt initial OT evaluation when medically and clinically appropriate      KOSTA Arango, OTR/L

## 2019-11-17 NOTE — PROGRESS NOTES
Daily Progress Note - Critical Care   Ashleigh Kenny 69 y o  female MRN: [id number]  Unit/Bed#: ICU 14 Encounter: [id number]        ----------------------------------------------------------------------------------------  HPI/24hr events: Patient initially presented to [facility] with decreased alertness on 11/11  MRI brain showed right posterior temporal/occipital lobe cortical thickening concerning for neoplasm  EEG showed epileptiform discharge and non-convulsive electrographic seizures  Patient was transferred to [facility] for vEEG monitoring  Patient continued to have periodic electrographic seizures despite AEDs  Patient was then transferred to ICU for intubation and burst suppression  Overnight, patient continued to require upward titration of propofol and versed  Noted to be in sinus bradycardia this am     ---------------------------------------------------------------------------------------  SUBJECTIVE  Patient examined at bedside  Appears sedated, no response to stimuli  Continues on vEEG      Review of Systems   Unable to perform ROS: Other (sedated and intubated)     ---------------------------------------------------------------------------------------  Assessment and Plan:    Neuro:    Status epilepticus  o Currently on propofol and versed gtts for burst suppression  o Tylenol prn  o Fentanyl prn  o AEDs: vimpat 200 mg Q 12 hours, keppra 1750 mg Q 12 hours, depacon 300 mg Q 8 hours  o Currently on continuous video EEG  o Monitor neuro exam  o No SAT today  o Neurology on consult-appreciate recommendations    · Right temporo-occiptal lobe cortical thickening  · Continue solumedrol 1 gram daily, day 2/5  · Neurosurgery on consult-appreciate recommendations    CV:    Bradycardia  o Propofol likely contributing to bradycardia  o Dopamine at 5, consider increasing to 7 5  o Will obtain ECHO  o History HTN-hold antihypertensives given bradycardia      Pulm:   Concern for airway protection  o Intubated 11/16 for airway protection  o AC 16, , PEEP 5, FIO2 40%  o Obtain blood gas now  o Will hold off on SBT given need for burst supression      GI:    No acute issues  o Senna HS  o Stress ulcer prophylaxis ordered      :    No acute concerns  o Sierra catheter in place  o Trend I&O  o Monitor BUN and creatinine      F/E/N:    IV NSS at 60 ml/hr   Monitor electrolytes and replaced as needed   NPO      Heme/Onc:    No acute concerns  o History of breast CA  o Monitor hgb and plts   VTE prophylaxis with heparin and SCDs      Endo:    Diabetes mellitus  o Monitor glucose, sliding scale insulin coverage  o High dose steroids likely contributing to hyperglycemia      ID:    No acute concerns  o Monitor WBC and fever curve  o No indication for abx      MSK/Skin:    No acute concerns  o PT and OT consults ordered      Disposition: Continue Critical Care   Code Status: Level 2 - DNAR: but accepts endotracheal intubation  ---------------------------------------------------------------------------------------  ICU CORE MEASURES    Prophylaxis   VTE Pharmacologic Prophylaxis: Heparin  VTE Mechanical Prophylaxis: sequential compression device  Stress Ulcer Prophylaxis: Pantoprazole IV     ABCDE Protocol (if indicated)  Plan to perform spontaneous awakening trial today? No (provide explanation): burst suppression  Plan to perform spontaneous breathing trial today? No (provide explanation): burst suppression  Obvious barriers to extubation?  Yes  CAM-ICU: unable to assess    Invasive Devices Review  Invasive Devices     Central Venous Catheter Line            CVC Central Lines 11/16/19 Triple 16cm less than 1 day          Peripheral Intravenous Line            Peripheral IV 11/15/19 Right Antecubital 1 day    Peripheral IV 11/15/19 Right Forearm 1 day          Drain            Urethral Catheter Non-latex 16 Fr  1 day    NG/OG/Enteral Tube Orogastric 16 Fr Center mouth less than 1 day Airway            ETT  Oral 7 5 mm less than 1 day              Can any invasive devices be discontinued today? No (provide explanation): requires central line for medications  ---------------------------------------------------------------------------------------  OBJECTIVE    Physical Exam   Constitutional: She appears well-developed and well-nourished  No distress  HENT:   Head: Normocephalic and atraumatic  Mouth/Throat: Oropharynx is clear and moist    Eyes: Conjunctivae are normal  No scleral icterus  Pupils equal and round, non-reactive to light   Neck: Normal range of motion  Neck supple  Cardiovascular: Regular rhythm, normal heart sounds and intact distal pulses  bradycardia   Pulmonary/Chest: Effort normal and breath sounds normal  No stridor  No respiratory distress  She has no wheezes  She has no rales  Abdominal: Soft  Bowel sounds are normal  She exhibits no distension  Musculoskeletal: Normal range of motion  She exhibits no edema, tenderness or deformity  Lymphadenopathy:     She has no cervical adenopathy  Neurological: GCS eye subscore is 1  GCS verbal subscore is 1  GCS motor subscore is 1  GCS 3T   Skin: Skin is warm and dry  Capillary refill takes less than 2 seconds  She is not diaphoretic  No erythema  No pallor  Vitals reviewed        Vitals   Vitals:    19 0450 19 0500 19 0600 19 0700   BP:  (!) 112/49 133/54 132/58   Pulse:  (!) 38 (!) 34 (!) 34   Resp:  16 16 15   Temp:       TempSrc:       SpO2: 97% 97% 97% 98%   Weight:       Height:         Temp (24hrs), Av 3 °F (36 8 °C), Min:97 5 °F (36 4 °C), Max:99 4 °F (37 4 °C)  Current: Temperature: 97 5 °F (36 4 °C)      Invasive/non-invasive ventilation settings   Respiratory    Lab Data (Last 4 hours)    None         O2/Vent Data (Last 4 hours)       0450  0726         Vent Mode AC/VC AC/VC      Resp Rate (BPM) (BPM) 16 16      Vt (mL) (mL) 400 400      FIO2 (%) (%) 40 40 PEEP (cmH2O) (cmH2O) 5 5      Patient safety screen outcome:  Failed      MV 6 39 6 9                  Height and Weights   Height: 4' 11" (149 9 cm)  IBW: 43 2 kg  Body mass index is 27 43 kg/m²  Weight (last 2 days)     Date/Time   Weight    11/16/19 1730   61 6 (135 8)                Intake and Output  I/O       11/15 0701 - 11/16 0700 11/16 0701 - 11/17 0700 11/17 0701 - 11/18 0700    P  O  0 0     I V  (mL/kg)  3555 9 (57 7)     IV Piggyback  1700     Total Intake(mL/kg) 0 (0) 5255 9 (85 3)     Urine (mL/kg/hr) 2100 (1 4) 4025 (2 7)     Emesis/NG output  150     Total Output 2100 4175     Net -2100 +1080 9                    Nutrition       Diet Orders   (From admission, onward)             Start     Ordered    11/16/19 1658  Diet NPO  Diet effective now     Question Answer Comment   Diet Type NPO    RD to adjust diet per protocol?  No        11/16/19 1658                  Laboratory and Diagnostics:  Results from last 7 days   Lab Units 11/17/19  0506 11/16/19  0651 11/15/19  0525 11/14/19  1804 11/14/19  0849 11/14/19  0650 11/13/19  0620 11/12/19  0459 11/11/19  0950   WBC Thousand/uL 9 00 7 77 10 14  --   --  10 29* 8 10 9 39 10 28*   HEMOGLOBIN g/dL 14 1 14 5 13 8  --   --  14 0 14 2 13 8 16 4*   HEMATOCRIT % 43 0 45 0 43 4  --   --  43 1 46 1 42 5 51 3*   PLATELETS Thousands/uL 166 158 171 143* 167 176 174 168 190   NEUTROS PCT %  --  55 63  --   --  65 58 58 82*   MONOS PCT %  --  12 11  --   --  12 13* 12 4     Results from last 7 days   Lab Units 11/17/19  0506 11/16/19  0651 11/15/19  0525 11/14/19  0650 11/13/19  0620 11/12/19  0459 11/11/19  0950   SODIUM mmol/L 147* 142 142 138 135* 136 137   POTASSIUM mmol/L 3 3* 3 9 3 9 4 0 3 9 4 1 5 6*   CHLORIDE mmol/L 117* 111* 110* 108 104 103 101   CO2 mmol/L 19* 24 22 22 22 23 27   ANION GAP mmol/L 11 7 10 8 9 10 9   BUN mg/dL 10 8 11 9 8 15 17   CREATININE mg/dL 0 84 0 71 0 71 0 79 0 78 0 86 0 97   CALCIUM mg/dL 8 8 8 4 8 5 8 8 8 2* 8 4 9 2   GLUCOSE RANDOM mg/dL 192* 133 104 87 104 104 145*   ALT U/L  --  18 21 24 22 22 32   AST U/L  --  16 19 26 25 22 44   ALK PHOS U/L  --  86 95 94 99 101 126*   ALBUMIN g/dL  --  2 8* 3 2* 3 4* 3 4* 3 4* 4 1   TOTAL BILIRUBIN mg/dL  --  1 28* 1 20* 1 29* 1 40* 1 40* 1 10*     Results from last 7 days   Lab Units 11/17/19  0506 11/16/19  0651 11/15/19  0525 11/14/19  0650 11/13/19  0620 11/12/19  0459   MAGNESIUM mg/dL 1 9 2 0 1 9 2 0 1 7 1 8   PHOSPHORUS mg/dL 3 2 2 3 2 5 2 9 2 1* 2 6      Results from last 7 days   Lab Units 11/14/19  0849 11/11/19  0950   INR  1 10 0 96   PTT seconds  --  24*          Results from last 7 days   Lab Units 11/11/19  0950   LACTIC ACID mmol/L 1 8     ABG:  Results from last 7 days   Lab Units 11/16/19  1807   PH ART  7 406   PCO2 ART mm Hg 34 6*   PO2 ART mm Hg 107 9   HCO3 ART mmol/L 21 2*   BASE EXC ART mmol/L -2 7   ABG SOURCE  Radial, Right     VBG:  Results from last 7 days   Lab Units 11/16/19  1807   ABG SOURCE  Radial, Right     Results from last 7 days   Lab Units 11/12/19  0459   PROCALCITONIN ng/ml <0 05       Micro  Results from last 7 days   Lab Units 11/11/19  1656 11/11/19  0957 11/11/19  0950   BLOOD CULTURE   --  No Growth After 5 Days  No Growth After 5 Days  URINE CULTURE  No Growth <1000 cfu/mL  --   --    MRSA CULTURE ONLY  No Methicillin Resistant Staphlyococcus aureus (MRSA) isolated  --   --        EKG: Sinus bradycardia on telemetry  Imaging:   US bedside procedure   Final Result by MIKAYLA, ABILIO (11/16 1927)      FL lumbar puncture   Final Result by Travis Silva MD (11/14 1532)      Successful fluoroscopically guided lumbar puncture utilizing Biofire technique  Procedure was performed by KENYATTA Jaime, PA-C under the direct supervision of Dr Thom Aparicio performed: [id number]         CT head wo contrast   Final Result by Maddie Jensen DO (11/13 8391)      No acute intracranial abnormality  Workstation performed: [id number]         MRI brain w wo contrast    (Results Pending)   X-ray chest 1 view    (Results Pending)   XR chest portable    (Results Pending)    I have personally reviewed pertinent reports     and I have personally reviewed pertinent films in PACS    Active Medications  Scheduled Meds:  Current Facility-Administered Medications:  acetaminophen 650 mg Oral Q6H PRN Renu Flores MD    atorvastatin 40 mg Oral Daily With Artur Randall MD    chlorhexidine 15 mL Swish & Spit Q12H [facility] Renu Flores MD    DOPamine in dextrose 5% 1-20 mcg/kg/min Intravenous Titrated Marino Jacob MD Last Rate: 5 mcg/kg/min (11/17/19 0548)   fentanyl citrate (PF) 100 mcg Intravenous Once Renu Flores MD    fentanyl citrate (PF) 50 mcg Intravenous Q1H PRN Renu Flores MD    heparin (porcine) 5,000 Units Subcutaneous Q8H [facility] Renu Flores MD    hydrocortisone  Topical BID Renu Flores MD    insulin lispro 1-5 Units Subcutaneous TID AC Mariano Gomez DO    lacosamide (VIMPAT) IVPB 200 mg Intravenous Q12H Renu Flores MD Last Rate: 200 mg (11/17/19 0525)   levETIRAcetam 1,750 mg Intravenous Q12H [facility] Renu Flores MD Last Rate: 1,750 mg (11/16/19 2314)   methylPREDNISolone sodium succinate 1,000 mg Intravenous Daily Rohan Cunha MD Last Rate: Stopped (11/16/19 2235)   midazolam 50 mg/hr Intravenous Continuous Marino Jacob MD Last Rate: 50 mg/hr (11/17/19 1219)   midazolam 10 mg Intravenous Once Marino Jacob MD    potassium chloride 20 mEq Intravenous Q2H CHRISTINE Pena    propofol 80 mcg/kg/min Intravenous Titrated Antwan Wong PA-C Last Rate: 80 mcg/kg/min (11/17/19 2049)   senna 17 6 mg Oral HS Renu Flores MD    sodium chloride 60 mL/hr Intravenous Continuous Renu Flores MD Last Rate: 60 mL/hr (11/16/19 1930)   valproate sodium 300 mg Intravenous Q8H MD Keanu Last Rate: 300 mg (11/17/19 6124)     Continuous Infusions:    DOPamine in dextrose 5% 1-20 mcg/kg/min Last Rate: 5 mcg/kg/min (11/17/19 0548)   midazolam 50 mg/hr Last Rate: 50 mg/hr (11/17/19 0624)   propofol 80 mcg/kg/min Last Rate: 80 mcg/kg/min (11/17/19 0651)   sodium chloride 60 mL/hr Last Rate: 60 mL/hr (11/16/19 1930)     PRN Meds:     acetaminophen 650 mg Q6H PRN   fentanyl citrate (PF) 50 mcg Q1H PRN       Allergies   Allergies   Allergen Reactions    Percocet [Oxycodone-Acetaminophen]      Patient tolerated Tylenol in the past in 2016       Advance Directive and Living Will:      Power of :    POLST:        Counseling / Coordination of Care  Total Critical Care time spent 30 minutes excluding procedures, teaching and family updates  CHRISTINE Johnson        Portions of the record may have been created with voice recognition software  Occasional wrong word or "sound a like" substitutions may have occurred due to the inherent limitations of voice recognition software    Read the chart carefully and recognize, using context, where substitutions have occurred

## 2019-11-17 NOTE — RESPIRATORY THERAPY NOTE
RT Ventilator Management Note  Samia Kenny 69 y o  female MRN: [id number]  Unit/Bed#: ICU 14 Encounter: [id number]      Daily Screen       11/16/2019  1743 11/17/2019  0726          Patient safety screen outcome[de-identified]  Failed  Failed      Not Ready for Weaning due to[de-identified]    Underline problem not resolved; Recieving paralytics              Physical Exam:   Assessment Type: Assess only  General Appearance: Sedated  Respiratory Pattern: Assisted  Chest Assessment: Chest expansion symmetrical  Bilateral Breath Sounds: Clear  R Breath Sounds: Diminished      Resp Comments: Pt sedated  Pt has clear bs  Sx not needed  SBT not started due to sedation

## 2019-11-17 NOTE — PHYSICAL THERAPY NOTE
Physical Therapy Cancellation Note    PT orders received, chart reviewed  Pt currently intubated and sedation  PT to continue to follow and see when medically appropriate when able      Marsha Garcia, PT, DPT

## 2019-11-17 NOTE — PROGRESS NOTES
PATIENT NAME: Cate De Paz,  YOB: 1945  MEDICAL RECORD NUMBER: [id number]  Neurology Follow up Note     Following patient for: nonconvulsive status epilepticus    Overnight Events: burst suppressed over night  Bradycardic, has been limiting sedation to some extent  On dopamine  Subjective: Patient in medically induced coma, unable to complete 12 point ROS       Scheduled Meds:  Current Facility-Administered Medications:  acetaminophen 650 mg Oral Q6H PRN Misael Doe MD    atorvastatin 40 mg Oral Daily With Dalton Stuart MD    chlorhexidine 15 mL Swish & Spit Q12H [address] Misael Doe MD    DOPamine in dextrose 5% 1-20 mcg/kg/min Intravenous Titrated Michelle Ferrara MD Last Rate: 9 5 mcg/kg/min (11/17/19 1531)   fentanyl citrate (PF) 100 mcg Intravenous Once Misael Doe MD    fentanyl citrate (PF) 50 mcg Intravenous Q1H PRN Misael Doe MD    heparin (porcine) 5,000 Units Subcutaneous Q8H [address] Stacie Doe MD    hydrocortisone  Topical BID Misael Doe MD    insulin lispro 1-5 Units Subcutaneous TID AC Jasmeet Avila DO    lacosamide (VIMPAT) IVPB 200 mg Intravenous Q12H Misael Doe MD Last Rate: 200 mg (11/17/19 0525)   levETIRAcetam 1,750 mg Intravenous Q12H [address] Stacie Doe MD Last Rate: Stopped (11/17/19 0855)   methylPREDNISolone sodium succinate 1,000 mg Intravenous Daily Abilio Simmons MD Last Rate: Stopped (11/17/19 1122)   midazolam 70 mg/hr Intravenous Continuous CHRISTINE Pena Last Rate: 70 mg/hr (11/17/19 1527)   midazolam 10 mg Intravenous Once Michelle Ferrara MD    multi-electrolyte 60 mL/hr Intravenous Continuous Sixto Michael MD    pantoprazole 40 mg Intravenous Daily CHRISTINE Pena    PHENobarbital 400 mg Intravenous Q8H CHRISTINE Pena Last Rate: Stopped (11/17/19 1349)   potassium chloride 40 mEq Intravenous Once CHRISTINE Pena Last Rate: 40 mEq (11/17/19 1405) propofol 60 mcg/kg/min Intravenous Titrated CHRISTINE Pena Last Rate: 60 mcg/kg/min (11/17/19 1407)   senna 17 6 mg Oral HS Markie Alvarez MD    valproate sodium 300 mg Intravenous Q8H [address] Markie Alvarez MD Last Rate: Stopped (11/17/19 1506)     Continuous Infusions:  DOPamine in dextrose 5% 1-20 mcg/kg/min Last Rate: 9 5 mcg/kg/min (11/17/19 1531)   midazolam 70 mg/hr Last Rate: 70 mg/hr (11/17/19 1527)   multi-electrolyte 60 mL/hr    propofol 60 mcg/kg/min Last Rate: 60 mcg/kg/min (11/17/19 1407)     PRN Meds:   acetaminophen    fentanyl citrate (PF)      Objective  /65   Pulse (!) 32   Temp 97 5 °F (36 4 °C) (Oral)   Resp 16   Ht 4' 11" (1 499 m)   Wt 61 6 kg (135 lb 12 9 oz)   LMP 05/29/1998   SpO2 99%   BMI 27 43 kg/m²   GEN: intubated, sedated  HEENT: NC/AT  Anicteric  PPC    CVS: RRR  S1S2  No M/R/G    LUNGS: B/L entry, no wheezes, rhonchi or rales  Mental Status: The patient was comatose  Cranial Nerves:   No corneals, no gag  Minimal pupillary response  Face symmetric  Motor Examination/Sensory exam    No motor response or grimace to noxious stim  Coordination: unable to perform  Gait: unable to ambulate due to sedation         Recent Results (from the past 24 hour(s))   Fingerstick Glucose (POCT)    Collection Time: 11/16/19  4:08 PM   Result Value Ref Range    POC Glucose 141 (H) 65 - 140 mg/dl   Blood gas, arterial    Collection Time: 11/16/19  6:07 PM   Result Value Ref Range    pH, Arterial 7 406 7 350 - 7 450    pCO2, Arterial 34 6 (L) 36 0 - 44 0 mm Hg    pO2, Arterial 107 9 75 0 - 129 0 mm Hg    HCO3, Arterial 21 2 (L) 22 0 - 28 0 mmol/L    Base Excess, Arterial -2 7 mmol/L    O2 Content, Arterial 18 9 16 0 - 23 0 mL/dL    O2 HGB,Arterial  97 0 94 0 - 97 0 %    SOURCE Radial, Right     FABIENNE TEST Yes     Vent Type- AC AC     AC Rate 16     Tidal Volume 400 ml    Inspired Air (FIO2) 40     PEEP 5    Fingerstick Glucose (POCT)    Collection Time: 11/17/19 12:07 AM   Result Value Ref Range    POC Glucose 155 (H) 65 - 140 mg/dl   Magnesium    Collection Time: 11/17/19  5:06 AM   Result Value Ref Range    Magnesium 1 9 1 6 - 2 6 mg/dL   Phosphorus    Collection Time: 11/17/19  5:06 AM   Result Value Ref Range    Phosphorus 3 2 2 3 - 4 1 mg/dL   Basic metabolic panel    Collection Time: 11/17/19  5:06 AM   Result Value Ref Range    Sodium 147 (H) 136 - 145 mmol/L    Potassium 3 3 (L) 3 5 - 5 3 mmol/L    Chloride 117 (H) 100 - 108 mmol/L    CO2 19 (L) 21 - 32 mmol/L    ANION GAP 11 4 - 13 mmol/L    BUN 10 5 - 25 mg/dL    Creatinine 0 84 0 60 - 1 30 mg/dL    Glucose 192 (H) 65 - 140 mg/dL    Calcium 8 8 8 3 - 10 1 mg/dL    eGFR 70 ml/min/1 73sq m   CBC and differential    Collection Time: 11/17/19  5:06 AM   Result Value Ref Range    WBC 9 00 4 31 - 10 16 Thousand/uL    RBC 4 54 3 81 - 5 12 Million/uL    Hemoglobin 14 1 11 5 - 15 4 g/dL    Hematocrit 43 0 34 8 - 46 1 %    MCV 95 82 - 98 fL    MCH 31 1 26 8 - 34 3 pg    MCHC 32 8 31 4 - 37 4 g/dL    RDW 14 8 11 6 - 15 1 %    MPV 9 9 8 9 - 12 7 fL    Platelets 266 629 - 517 Thousands/uL    nRBC 0 /100 WBCs    Neutrophils Relative 90 (H) 43 - 75 %    Immat GRANS % 0 0 - 2 %    Lymphocytes Relative 9 (L) 14 - 44 %    Monocytes Relative 1 (L) 4 - 12 %    Eosinophils Relative 0 0 - 6 %    Basophils Relative 0 0 - 1 %    Neutrophils Absolute 8 00 (H) 1 85 - 7 62 Thousands/µL    Immature Grans Absolute 0 04 0 00 - 0 20 Thousand/uL    Lymphocytes Absolute 0 82 0 60 - 4 47 Thousands/µL    Monocytes Absolute 0 13 (L) 0 17 - 1 22 Thousand/µL    Eosinophils Absolute 0 00 0 00 - 0 61 Thousand/µL    Basophils Absolute 0 01 0 00 - 0 10 Thousands/µL   ECG 12 lead    Collection Time: 11/17/19  7:54 AM   Result Value Ref Range    Ventricular Rate 35 BPM    Atrial Rate 35 BPM    ID Interval 175 ms    QRSD Interval 146 ms    QT Interval 608 ms    QTC Interval 464 ms    P Axis 68 degrees    QRS Axis 21 degrees    T Wave Axis 36 degrees   Blood gas, arterial    Collection Time: 11/17/19  8:15 AM   Result Value Ref Range    pH, Arterial 7 408 7 350 - 7 450    pCO2, Arterial 28 1 (LL) 36 0 - 44 0 mm Hg    pO2, Arterial 155 0 (H) 75 0 - 129 0 mm Hg    HCO3, Arterial 17 3 (L) 22 0 - 28 0 mmol/L    Base Excess, Arterial -5 7 mmol/L    O2 Content, Arterial 21 2 16 0 - 23 0 mL/dL    O2 HGB,Arterial  98 2 (H) 94 0 - 97 0 %    SOURCE Radial, Right     FABIENNE TEST Yes     Vent Type- AC AC     AC Rate 16     Tidal Volume 400 ml    Inspired Air (FIO2) 40     PEEP 5    Platelet count    Collection Time: 11/17/19  1:26 PM   Result Value Ref Range    Platelets 361 557 - 166 Thousands/uL    MPV 10 2 8 9 - 12 7 fL   Valproic acid level, total    Collection Time: 11/17/19  1:26 PM   Result Value Ref Range    Valproic Acid, Total 66 50 - 100 ug/mL   TSH, 3rd generation with Free T4 reflex    Collection Time: 11/17/19  1:26 PM   Result Value Ref Range    TSH 3RD GENERATON 0 548 0 358 - 3 740 uIU/mL   Fingerstick Glucose (POCT)    Collection Time: 11/17/19  1:28 PM   Result Value Ref Range    POC Glucose 169 (H) 65 - 140 mg/dl         A/P  69 y o  Female with super refractory status epilepticus  Obtained stat depacon level, level 66 so bolused another 500 mg, increased maintenance to 500 q 8, f/u trough level in AM    Asked ICU team to add phenobarbitol for deeper supression as not fully supressed per epilepsy and morphology of bursts still very epileptogenic  Continue steroids  Continue vimpat 200 q 12, keppra 1750 q 12 and depacon 500 tid  Consider topiramate tomorrow  Goal depakote level 90- 120  Euglycemia/normothermia  Total critical care time 35 minutes

## 2019-11-17 NOTE — PROCEDURES
Central Line Insertion  Date/Time: 11/16/2019 7:21 PM  Performed by: Kemi Glez PA-C  Authorized by: Kemi Glez PA-C     Patient location:  Bedside  Consent:     Consent obtained:  Written    Consent given by: Consent obtained by Dr Ashwin Faustin  [place] protocol:     Patient identity confirmed:  Arm band  Pre-procedure details:     Hand hygiene: Hand hygiene performed prior to insertion      Sterile barrier technique: All elements of maximal sterile technique followed      Skin preparation:  ChloraPrep    Skin preparation agent: Skin preparation agent completely dried prior to procedure    Indications:     Central line indications: medications requiring central line    Sedation:     Sedation type: Moderate (conscious) sedation (On versed infusion)  Anesthesia (see MAR for exact dosages): Anesthesia method:  Local infiltration    Local anesthetic:  Lidocaine 1% w/o epi  Procedure details:     Location:  Right internal jugular    Vessel type: vein      Laterality:  Right    Approach: percutaneous technique used      Patient position:  Trendelenburg    Catheter type:  Triple lumen 16cm    Catheter size:  7 Fr    Landmarks identified: yes      Ultrasound guidance: yes      Sterile ultrasound techniques: Sterile gel and sterile probe covers were used      Number of attempts:  1    Successful placement: yes    Post-procedure details:     Post-procedure:  Line sutured and dressing applied    Assessment:  Blood return through all ports    Patient tolerance of procedure:   Tolerated well, no immediate complications    Observer: Yes      Observer name:  Montez Guallpa RN  Comments:      CXR pending

## 2019-11-18 PROBLEM — N17.9 ACUTE KIDNEY INJURY (HCC): Status: ACTIVE | Noted: 2019-01-01

## 2019-11-18 PROBLEM — E87.2 HYPERCHLOREMIC METABOLIC ACIDOSIS: Status: ACTIVE | Noted: 2019-01-01

## 2019-11-18 PROBLEM — E87.0 HYPERNATREMIA: Status: ACTIVE | Noted: 2019-01-01

## 2019-11-18 NOTE — PHYSICAL THERAPY NOTE
Physical Therapy Cancellation Note    PT orders received, chart review performed  Pt is currently intubated/sedated and is not appropriate for active participation in PT evaluation  PT to hold evaluation, continue to follow       Baby Joiner PT, DPT

## 2019-11-18 NOTE — RESPIRATORY THERAPY NOTE
resp care      11/18/19 1518   Respiratory Assessment   Resp Comments Pt  resting comfortably at this time   No changes at this time   ETT  Oral 7 5 mm   Placement Date/Time: 11/16/19 5730   Technique: Direct laryngoscopy  Type: Oral  Tube Size: 7 5 mm  Secured at (cm): 21   Secured at (cm) 21   Measured from Lips   Secured Location Right   Secured by Commercial tube meyer   Site Condition Dry   HI-LO Suction  Intermittent suction   Additional Assessments   SpO2 91 %

## 2019-11-18 NOTE — PROGRESS NOTES
Progress Note - Neurology   [facility] 69 y o  female [id number]  Unit/Bed#: ICU 14/ICU 14    Assessment:  3 69year-old with HTN, DM, and breast cancer who presented initially with altered mental status  MRI brain indicated cortical thickening of right posterior temporal lobe  Routine EEG showed epileptiform discharges  vEEG monitoring indicated frequent right lateralized periodic discharges and discrete focal seizures multiple times per hour  Refractory non-convulsive status epilepticus, unclear etiology, will continue complete suppression for 36 hours and obtain additional CSF labs to further evaluate  Plan:  - Continue vEEG monitoring, day 4, initiated on 11/14   - Continue suppression for 36 hours, then will start to wean sedation   - Continue the following medication regimen:    - Vimpat 200mg BID   - Keppra 1750mg BID   - Depacon 500mg Q8 hrs   - Ketamine 4mg/kg/hr IV   - Versed 70mg/hr   - Propofol 80mcg/kg/min  - Continue Solu-medrol 1000mg IV daily, day 3 of 5    - Unable to obtain CSF cytometry due to low cell count  - Lumbar puncture completed on 11/14  The following CSF labs are pending: AFB culture, cryptococcal antigen, ACE, and fungal culture  - Echocardiogram report pending  - Neuro checks  - Seizure precautions  - Fall precautions  - Notify Neurology with any changes in neuro examination  - Medical management and supportive care as per Critical Care team    Results:  1  vEEG monitoring (11/15): This prolonged, continuous video-EEG recording is abnormal  Very frequent right lateralized periodic discharges with superimposed fast activity and rhythmic activity (LPD+FR) maximal over the right posterior temporal region occur nearly continuously throughout this recording, often occurring at a frequency of 1-2 cps  Discrete focal seizures are evident multiple times per hour when discharges form well-organized and rhythmic runs that evolve in frequency     However, even the less well organized nearly continuous discharges present outside of clear discrete seizures may be ictal and are concerning in themselves for focal nonconvulsive status epilepticus  Theta frequency background slowing suggest mild nonspecific diffuse cerebral dysfunction while the findings noted above indicate superimposed dysfunction involving the right hemisphere  2  vEEG monitoring (11/14): This prolonged, continuous video-EEG recording is abnormal     Very frequent right lateralized periodic discharges (LPDs/PLEDs) maximal over the right posterior temporal region occur throughout this recording, often occurring every 1-2 seconds  LPDs suggest highly epileptogenic focal underlying acute/subacute structural/destructive neuronal dysfunction  LPDs can also be the sequelae of recent focal seizure (postictal LPDs)  Frequent runs of right posterior discharges and rhythmic activities consistent with focal electrographic seizures, typically lasting 10-45 seconds, without clear clinical correlate, occur about 20-60 times per hour throughout the duration of this recording  Theta frequency background slowing suggest mild nonspecific diffuse cerebral dysfunction while the findings noted above indicate superimposed dysfunction involving the right hemisphere  3  Routine EEG (11/13): This Routine EEG recorded during brief wakefulness, drowsiness, and sleep is abnormal due to presence of frequent right hemisphere frontotemporal, occipital region frequent epileptiform discharges and periodic non convulsive electrographic seizures eminating from this region  Clinical correlation is recommended  4  MRI brain (11/12): Cortical thickening in the undersurface of the right posterior temporal lobe extending to the occipital lobe  No vasogenic edema  Minor parenchymal enhancement and minor focal diffusion restriction  Concern for neoplasm, to include lymphoma  Lumbar   puncture is suggested    5  CSF results: WBC: 2, culture: no growth, cytometry: cancelled due to low cell count, glucose: 66, HSV: negative, protein: 34  Subjective:   Patient is intubated and sedated with Versed, ketamine, and propofol  Neurological examination is unreliable due to medically induced coma      Past Medical History:   Diagnosis Date    Bacterial pneumonia     Last Assessed: 11/14/2013    Breast cancer (HCC)     hx    Diabetes mellitus ([facility] [address] )     Hyperlipidemia     Hypertension      Past Surgical History:   Procedure Laterality Date    COMPLETE MASTECTOMY W/ SENTINEL NODE BIOPSY Left     Resolved: 6/19/2007    FL LUMBAR PUNCTURE  11/14/2019     Family History   Problem Relation Age of Onset    Heart disease Mother     Heart disease Father      Social History     Socioeconomic History    Marital status: Single     Spouse name: Not on file    Number of children: Not on file    Years of education: Not on file    Highest education level: Not on file   Occupational History    Not on file   Social Needs    Financial resource strain: Not on file    Food insecurity:     Worry: Not on file     Inability: Not on file    Transportation needs:     Medical: Not on file     Non-medical: Not on file   Tobacco Use    Smoking status: Current Every Day Smoker     Packs/day: 1 00    Smokeless tobacco: Current User   Substance and Sexual Activity    Alcohol use: Not Currently    Drug use: Not Currently    Sexual activity: Not Currently   Lifestyle    Physical activity:     Days per week: Not on file     Minutes per session: Not on file    Stress: Not on file   Relationships    Social connections:     Talks on phone: Not on file     Gets together: Not on file     Attends Jain service: Not on file     Active member of club or organization: Not on file     Attends meetings of clubs or organizations: Not on file     Relationship status: Not on file    Intimate partner violence:     Fear of current or ex partner: Not on file     Emotionally abused: Not on file Physically abused: Not on file     Forced sexual activity: Not on file   Other Topics Concern    Not on file   Social History Narrative    Not on file     Medications:   All current active meds have been reviewed and current meds:  Scheduled Meds:  Current Facility-Administered Medications:  acetaminophen 650 mg Oral Q6H PRN CHRISTINE Pena    bisacodyl 10 mg Rectal Daily PRN CHRISTINE Pena    chlorhexidine 15 mL Swish & Spit Q12H [facility] Harleen Dykes MD    DOPamine in dextrose 5% 1-20 mcg/kg/min Intravenous Titrated Yudy Rodriguez MD Last Rate: 16 mcg/kg/min (11/18/19 0532)   fentanyl citrate (PF) 100 mcg Intravenous Once Harleen Dykes MD    fentanyl citrate (PF) 50 mcg Intravenous Q1H PRN Harleen Dykes MD    heparin (porcine) 5,000 Units Subcutaneous Q8H [facility] Harleen Dykes MD    hydrocortisone  Topical BID Harleen Dykes MD    insulin lispro 1-6 Units Subcutaneous Q6H [facility] CHRISTINE Pena    ketamine 4 mg/kg/hr Intravenous Continuous Yudy Rodriguez MD Last Rate: 4 mg/kg/hr (11/18/19 9050)   lacosamide (VIMPAT) IVPB 200 mg Intravenous Q12H Harleen Dykes MD Last Rate: 200 mg (11/18/19 0717)   levETIRAcetam 1,750 mg Intravenous Q12H [facility] Harleen Dykes MD Last Rate: 1,750 mg (11/18/19 0823)   methylPREDNISolone sodium succinate 1,000 mg Intravenous Daily Josseline Yancey MD Last Rate: 1,000 mg (11/18/19 0901)   midazolam 70 mg/hr Intravenous Continuous CHRISTINE Pena Last Rate: 70 mg/hr (11/18/19 0903)   midazolam 10 mg Intravenous Once Yudy Rodriguez MD    propofol 80 mcg/kg/min Intravenous Titrated CHRISTINE Pena Last Rate: 80 mcg/kg/min (11/18/19 0903)   senna 17 6 mg Oral BID CHRISTINE Pena    valproate sodium 500 mg Intravenous [facility] Jurgen Badillo MD Last Rate: Stopped (11/18/19 0630)     Continuous Infusions:  DOPamine in dextrose 5% 1-20 mcg/kg/min Last Rate: 16 mcg/kg/min (11/18/19 4032)   ketamine 4 mg/kg/hr Last Rate: 4 mg/kg/hr (11/18/19 0811)   midazolam 70 mg/hr Last Rate: 70 mg/hr (11/18/19 0903)   propofol 80 mcg/kg/min Last Rate: 80 mcg/kg/min (11/18/19 0903)     PRN Meds:   acetaminophen    bisacodyl    fentanyl citrate (PF)     ROS:   Review of Systems   Reason unable to perform ROS: intubated, medically induced coma  Vitals:   BP 98/52   Pulse 62   Temp 97 5 °F (36 4 °C)   Resp 16   Ht 4' 11" (1 499 m)   Wt 67 1 kg (147 lb 14 9 oz)   LMP 05/29/1998   SpO2 96%   BMI 29 88 kg/m²     Physical Exam:   Physical Exam   Constitutional:   Patient is resting in bed, sedated   HENT:   Right Ear: External ear normal    Left Ear: External ear normal    Nose: Nose normal    vEEG leads in place   Eyes: Right eye exhibits no discharge  Left eye exhibits no discharge  Pulmonary/Chest:   Intubated   Skin: Skin is warm and dry  Neurologic Exam     Mental Status   Neurological examination not reliable due to medically induced coma  Patient is not reactive to stimuli     Cranial Nerves   Pupils: 4mm, equal, non-reactive bilaterally  Absent corneal reflex bilaterally  No oculocephalic reflex  No overt facial asymmetry     Motor Exam   No movements observed     Sensory Exam   No response to stimuli noted     Gait, Coordination, and Reflexes     Tremor   Resting tremor: absent    Labs: I have personally reviewed pertinent reports     Recent Results (from the past 24 hour(s))   Platelet count    Collection Time: 11/17/19  1:26 PM   Result Value Ref Range    Platelets 239 453 - 047 Thousands/uL    MPV 10 2 8 9 - 12 7 fL   Valproic acid level, total    Collection Time: 11/17/19  1:26 PM   Result Value Ref Range    Valproic Acid, Total 66 50 - 100 ug/mL   TSH, 3rd generation with Free T4 reflex    Collection Time: 11/17/19  1:26 PM   Result Value Ref Range    TSH 3RD GENERATON 0 548 0 358 - 3 740 uIU/mL   Fingerstick Glucose (POCT)    Collection Time: 11/17/19  1:28 PM   Result Value Ref Range    POC Glucose 169 (H) 65 - 140 mg/dl   Fingerstick Glucose (POCT)    Collection Time: 11/17/19  5:47 PM   Result Value Ref Range    POC Glucose 191 (H) 65 - 140 mg/dl   Fingerstick Glucose (POCT)    Collection Time: 11/17/19 11:38 PM   Result Value Ref Range    POC Glucose 212 (H) 65 - 140 mg/dl   CBC and differential    Collection Time: 11/18/19  5:32 AM   Result Value Ref Range    WBC 10 72 (H) 4 31 - 10 16 Thousand/uL    RBC 4 21 3 81 - 5 12 Million/uL    Hemoglobin 13 3 11 5 - 15 4 g/dL    Hematocrit 40 8 34 8 - 46 1 %    MCV 97 82 - 98 fL    MCH 31 6 26 8 - 34 3 pg    MCHC 32 6 31 4 - 37 4 g/dL    RDW 15 4 (H) 11 6 - 15 1 %    MPV 10 3 8 9 - 12 7 fL    Platelets 200 230 - 036 Thousands/uL    nRBC 0 /100 WBCs    Neutrophils Relative 86 (H) 43 - 75 %    Immat GRANS % 1 0 - 2 %    Lymphocytes Relative 6 (L) 14 - 44 %    Monocytes Relative 7 4 - 12 %    Eosinophils Relative 0 0 - 6 %    Basophils Relative 0 0 - 1 %    Neutrophils Absolute 9 32 (H) 1 85 - 7 62 Thousands/µL    Immature Grans Absolute 0 05 0 00 - 0 20 Thousand/uL    Lymphocytes Absolute 0 64 0 60 - 4 47 Thousands/µL    Monocytes Absolute 0 70 0 17 - 1 22 Thousand/µL    Eosinophils Absolute 0 00 0 00 - 0 61 Thousand/µL    Basophils Absolute 0 01 0 00 - 0 10 Thousands/µL   Basic metabolic panel    Collection Time: 11/18/19  5:32 AM   Result Value Ref Range    Sodium 152 (H) 136 - 145 mmol/L    Potassium 3 4 (L) 3 5 - 5 3 mmol/L    Chloride 124 (H) 100 - 108 mmol/L    CO2 17 (L) 21 - 32 mmol/L    ANION GAP 11 4 - 13 mmol/L    BUN 10 5 - 25 mg/dL    Creatinine 1 10 0 60 - 1 30 mg/dL    Glucose 195 (H) 65 - 140 mg/dL    Calcium 7 8 (L) 8 3 - 10 1 mg/dL    eGFR 50 ml/min/1 73sq m   Magnesium    Collection Time: 11/18/19  5:32 AM   Result Value Ref Range    Magnesium 1 8 1 6 - 2 6 mg/dL   Phosphorus    Collection Time: 11/18/19  5:32 AM   Result Value Ref Range    Phosphorus 2 6 2 3 - 4 1 mg/dL   Fingerstick Glucose (POCT)    Collection Time: 11/18/19  5:37 AM   Result Value Ref Range POC Glucose 188 (H) 65 - 140 mg/dl     Imaging: I have personally reviewed pertinent imaging and I have personally reviewed PACS reports  EKG, Pathology, and Other Studies: I have personally reviewed pertinent reports  VTE Prophylaxis: Sequential compression device (Venodyne)  and Heparin    Total time spent today 35 minutes  Greater than 50% of total time was spent with the patient and / or family counseling and / or coordination of care  A description of the counseling / coordination of care: Discussed with patient's family: MRI results, pending CSF labs, medication regimen, plan for suppression for 36 hours, and plans of care   Discussed plans of care with Critical Care team

## 2019-11-18 NOTE — OCCUPATIONAL THERAPY NOTE
OT CANCEL NOTE    OT orders received  Chart reviewed  Pt is currently intubated/sedated and not appropriate to engage in skilled OT services at this time  Will hold initial OT evaluation  Will continue to follow pt on caseload and see pt when medically stable and as clinically appropriate      Edna BRAMBILA, OTR/L

## 2019-11-18 NOTE — RESPIRATORY THERAPY NOTE
RT Ventilator Management Note  Jayce Kenny 69 y o  female MRN: [id number]  Unit/Bed#: ICU 14 Encounter: [id number]      Daily Screen       11/16/2019  1743 11/17/2019  0726          Patient safety screen outcome[de-identified]  Failed  Failed      Not Ready for Weaning due to[de-identified]    Underline problem not resolved; Recieving paralytics              Physical Exam:   Assessment Type: (P) Assess only  General Appearance: (P) Sedated  Respiratory Pattern: (P) Assisted  Chest Assessment: (P) Chest expansion symmetrical  Bilateral Breath Sounds: (P) Clear  R Breath Sounds: Diminished      Resp Comments: (P) Pt resting comfortably on current ac settings, will continue to monitor

## 2019-11-18 NOTE — SPEECH THERAPY NOTE
Speech Language/Pathology    Speech/Language Pathology Progress Note    Patient Name: Mega Mendes  [facility] Date: 11/18/2019    Chart reviewed, pt currently intubated  Please re-consult when extubated and appropriate for swallow assessment

## 2019-11-18 NOTE — PROGRESS NOTES
Daily Progress Note - Critical Care   Caridadwendie Kenny 69 y o  female MRN: [id number]  Unit/Bed#: ICU 14 Encounter: [id number]        ----------------------------------------------------------------------------------------  HPI/24hr events: Dagmar Kenny is a 69 y o  Female with PMHx HTN, DM2, breast cancer who presented to [place] [address] with change in mental status on 11/11/19  MRI brain showed right posterior temporal/occipital lobe cortical thickening concerning for neoplasm  EEG showed epileptiform discharges and non-convulsive electrographic seizures  Patient was [facility] for video EEG  Despite mulitple AEDs, patient continued to have periodic electrographic seizures  Patient was transferred to ICU for burst suppression and requiring intubation for airway protection  Versed and propofol gtt were titrated upward however patient required addition of ketamine gtt for burst suppression  Bradycardia improving on Dopamine gtt  Hypothermic overnight improved with warming blanket     ---------------------------------------------------------------------------------------  SUBJECTIVE  Patient examined in bed this am  Patient appears to be in no acute distress  Patient is sedated, RASS -4; does not respond to any stimuli       Review of Systems   Unable to perform ROS: Other (intubated/sedated)     ---------------------------------------------------------------------------------------  Assessment and Plan:    Neuro:    Status epilepticus  o Trend neuro exam  o Burst suppression with:  - Propofol at 80 mcg/kg/min  - Versed at 70 mg/hr  - Ketamine at 4 mg/kg/hr  - No SAT today  o Currently on continuous video EEG  o AEDs  - vimpat 200 mg Q 12 hours  - keppra 1750 mg Q 12 hours  - Depacon 500 mg Q 8 hours  - Phenobarbital 400 mg Q 8 x24 hours-completed  o LP on 11/14-results pending  o Neurology on consult-appreciate recommendations   Right temporo-occipital lobe cortical thickening  o Solumedrol 1 gram daily, day 3/5  o Leukemia/lymphoma flow cytology pending  o Neurosurgery sign off; no surgery intervention   Acute toxic/metabolic encephalopathy  o Likely secondary to status epilepticus requiring burst suppression  o Trend neuro exam  o Difficult to assess given need for burst suppression      CV:    Bradycardia  o Baseline heart rate 50s while awake  Heart rate as low as 32 on 11/17  Patient was on propofol at 80 however when titrated down, heart rate did not change  o Dopamine at 16 mcg/kg/min  o ECHO completed-follow up on report   History of hypertension  o Now hypotensive-on dopamine gtt  o Holding home antihypertensives given hypotension and bradycardia      Pulm:   Acute respiratory failure/airway protection  o Intubated on 11/16 for airway protections  o AC 16, , PEEP 5, FIO2 40%  o No SBT today given need for burst suppression      GI:    No acute issues  o Bowel regimen ordered-senna BID, dulcolax prn  o Stress ulcer prophylaxis discontinued-tube feedings started 11/17      :    No acute concerns  o Strict I&O  o Trend BUN & creatinine  o Maintain angel catheter today, consider d/c and void trial when off burst suppression      F/E/N:    Hyperchloremic metabolic acidosis  o IVF changed 11/17 to isolyte, now off  o Monitor electrolytes  o NPO   Jevity 1 2 at 20 ml/hr  · Hypernatremia  · Sodium 152  · Trend sodium  · Free water flushes with tube feeding    Heme/Onc:    No acute concerns  o VTE prophylaxis: heparin and SCDs   History of breast cancer  o Limb alert left arm      Endo:    Diabetes mellitus type 2  o Glucose range 108-221 over past 24 hours  o High dose steroids likely contributing to hyperglycemia  o Increased sliding scale insulin coverage to algorithm 3      ID:    No acute concerns  o Max temperature 99 7  o Trend WBCs and temperature curve      MSK/Skin:    No acute concerns  o PT and OT consults ordered-resume when appropriate  o Turn and reposition Q 2 hours  o Allevyn per protocol      Disposition: Continue Critical Care   Code Status: Level 2 - DNAR: but accepts endotracheal intubation  ---------------------------------------------------------------------------------------  ICU CORE MEASURES    Prophylaxis   VTE Pharmacologic Prophylaxis: Heparin  VTE Mechanical Prophylaxis: sequential compression device  Stress Ulcer Prophylaxis: Prophylaxis Not Indicated     ABCDE Protocol (if indicated)  Plan to perform spontaneous awakening trial today? No (provide explanation): burst suppression of status epilpticus  Plan to perform spontaneous breathing trial today? No  Obvious barriers to extubation? Yes  CAM-ICU: unable to assess    Invasive Devices Review  Invasive Devices     Central Venous Catheter Line            CVC Central Lines 11/16/19 Triple 16cm 1 day          Peripheral Intravenous Line            Peripheral IV 11/15/19 Right Forearm 2 days          Arterial Line            Arterial Line 11/17/19 Right Radial less than 1 day          Drain            Urethral Catheter Non-latex 16 Fr  2 days    NG/OG/Enteral Tube Orogastric 16 Fr Center mouth 1 day          Airway            ETT  Oral 7 5 mm 1 day              Can any invasive devices be discontinued today? No (provide explanation): central line needed for lack of IV access and medications requiring central line  Mariela for accurate I&O   ---------------------------------------------------------------------------------------  OBJECTIVE    Physical Exam   Constitutional: She appears well-developed and well-nourished  HENT:   Head: Normocephalic and atraumatic  Mouth/Throat: Oropharynx is clear and moist  No oropharyngeal exudate  Eyes: Conjunctivae are normal  No scleral icterus  Pupils 2 mm, non-reactive   Neck: Normal range of motion  Neck supple  No JVD present  Cardiovascular: Normal rate, regular rhythm, normal heart sounds and intact distal pulses  Exam reveals no friction rub  No murmur heard  Pulmonary/Chest: Effort normal and breath sounds normal  No respiratory distress  Abdominal: Soft  Bowel sounds are normal  She exhibits no distension  Genitourinary:   Genitourinary Comments: Sierra catheter   Musculoskeletal: Normal range of motion  She exhibits no edema, tenderness or deformity  Lymphadenopathy:     She has no cervical adenopathy  Neurological: GCS eye subscore is 1  GCS verbal subscore is 1  GCS motor subscore is 1  GCS 3T   Skin: Skin is warm and dry  Capillary refill takes less than 2 seconds  No erythema  No pallor  Vitals reviewed  Vitals   Vitals:    19 0429 19 0527 19 0530 19 0600   BP:       Pulse: 68 (!) 54 68 70   Resp: 16 15 16 16   Temp: 99 3 °F (37 4 °C) 99 3 °F (37 4 °C) 99 3 °F (37 4 °C) 99 °F (37 2 °C)   TempSrc:       SpO2: 97% 95% 95% 96%   Weight:    67 1 kg (147 lb 14 9 oz)   Height:         Temp (24hrs), Av 7 °F (36 5 °C), Min:92 2 °F (33 4 °C), Max:99 7 °F (37 6 °C)  Current: Temperature: 99 °F (37 2 °C)  HR: 70  BP: 108/54 arterial line  RR: 16  SpO2: 96%    Invasive/non-invasive ventilation settings   Respiratory    Lab Data (Last 4 hours)    None         O2/Vent Data (Last 4 hours)    None                Height and Weights   Height: 4' 11" (149 9 cm)  IBW: 43 2 kg  Body mass index is 29 88 kg/m²  Weight (last 2 days)     Date/Time   Weight    19 0600   67 1 (147 93)    19 1730   61 6 (135 8)                Intake and Output  I/O        07 -  0700  07 -  0700    P  O  0 0    I V  (mL/kg) 3555 9 (57 7) 4239 8 (63 2)    NG/GT  110    IV Piggyback 1700 1750    Feedings  223    Total Intake(mL/kg) 5255 9 (85 3) 6322 8 (94 2)    Urine (mL/kg/hr) 4025 (2 7) 2520 (1 6)    Emesis/NG output 150 0    Total Output 4175 2520    Net +1080 9 +3802 8              UOP: 105 ml/hr     Nutrition       Diet Orders   (From admission, onward)             Start     Ordered    19 1129  Diet Enteral/Parenteral; Tube Feeding No Oral Diet; Jevity 1 2 Nico; Continuous; 20  Diet effective now     Question Answer Comment   Diet Type Enteral/Parenteral    Enteral/Parenteral Tube Feeding No Oral Diet    Tube Feeding Formula: Jevity 1 2 Nico    Bolus/Cyclic/Continuous Continuous    Tube Feeding Goal Rate (mL/hr): 20    RD to adjust diet per protocol? No        11/17/19 1131              TF currently running at 20 ml/hr with a goal of 20 ml/hr   Formula: Jevity 1 2    Laboratory and Diagnostics:  Results from last 7 days   Lab Units 11/18/19  0532 11/17/19  1326 11/17/19  0506 11/16/19  9972 11/15/19  0525 11/14/19  1804 11/14/19  0849 11/14/19  0650 11/13/19  0620 11/12/19  0459   WBC Thousand/uL 10 72*  --  9 00 7 77 10 14  --   --  10 29* 8 10 9 39   HEMOGLOBIN g/dL 13 3  --  14 1 14 5 13 8  --   --  14 0 14 2 13 8   HEMATOCRIT % 40 8  --  43 0 45 0 43 4  --   --  43 1 46 1 42 5   PLATELETS Thousands/uL 173 157 166 158 171 143* 167 176 174 168   NEUTROS PCT % 86*  --  90* 55 63  --   --  65 58 58   MONOS PCT % 7  --  1* 12 11  --   --  12 13* 12     Results from last 7 days   Lab Units 11/18/19  0532 11/17/19  0506 11/16/19  0651 11/15/19  0525 11/14/19  0650 11/13/19  0620 11/12/19  0459 11/11/19  0950   SODIUM mmol/L 152* 147* 142 142 138 135* 136 137   POTASSIUM mmol/L 3 4* 3 3* 3 9 3 9 4 0 3 9 4 1 5 6*   CHLORIDE mmol/L 124* 117* 111* 110* 108 104 103 101   CO2 mmol/L 17* 19* 24 22 22 22 23 27   ANION GAP mmol/L 11 11 7 10 8 9 10 9   BUN mg/dL 10 10 8 11 9 8 15 17   CREATININE mg/dL 1 10 0 84 0 71 0 71 0 79 0 78 0 86 0 97   CALCIUM mg/dL 7 8* 8 8 8 4 8 5 8 8 8 2* 8 4 9 2   GLUCOSE RANDOM mg/dL 195* 192* 133 104 87 104 104 145*   ALT U/L  --   --  18 21 24 22 22 32   AST U/L  --   --  16 19 26 25 22 44   ALK PHOS U/L  --   --  86 95 94 99 101 126*   ALBUMIN g/dL  --   --  2 8* 3 2* 3 4* 3 4* 3 4* 4 1   TOTAL BILIRUBIN mg/dL  --   --  1 28* 1 20* 1 29* 1 40* 1 40* 1 10*     Results from last 7 days   Lab Units 11/18/19  0532 11/17/19  0506 11/16/19  2608 11/15/19  0525 11/14/19  0650 11/13/19  0620 11/12/19  0459   MAGNESIUM mg/dL 1 8 1 9 2 0 1 9 2 0 1 7 1 8   PHOSPHORUS mg/dL 2 6 3 2 2 3 2 5 2 9 2 1* 2 6      Results from last 7 days   Lab Units 11/14/19  0849 11/11/19  0950   INR  1 10 0 96   PTT seconds  --  24*          Results from last 7 days   Lab Units 11/11/19  0950   LACTIC ACID mmol/L 1 8     ABG:  Results from last 7 days   Lab Units 11/17/19  0815   PH ART  7 408   PCO2 ART mm Hg 28 1*   PO2 ART mm Hg 155 0*   HCO3 ART mmol/L 17 3*   BASE EXC ART mmol/L -5 7   ABG SOURCE  Radial, Right     VBG:  Results from last 7 days   Lab Units 11/17/19  0815   ABG SOURCE  Radial, Right     Results from last 7 days   Lab Units 11/12/19  0459   PROCALCITONIN ng/ml <0 05       Micro  Results from last 7 days   Lab Units 11/11/19  1656 11/11/19  0957 11/11/19  0950   BLOOD CULTURE   --  No Growth After 5 Days  No Growth After 5 Days  URINE CULTURE  No Growth <1000 cfu/mL  --   --    MRSA CULTURE ONLY  No Methicillin Resistant Staphlyococcus aureus (MRSA) isolated  --   --        EKG: NSR on telemetry  Imaging:   XR chest portable   Final Result by Simone Lopez MD (11/17 7392)      Right internal jugular central line in satisfactory position  No pneumothorax  Workstation performed: [id number]         [address] [facility] bedside procedure   Final Result by Mansi Manuel (11/16 1927)      X-ray chest 1 view   Final Result by Melina Brown MD (11/17 1315)      Endotracheal tube in appropriate position            Workstation performed: [id number]         [facility] lumbar puncture   Final Result by Lester Croft MD (11/14 1532)      Successful fluoroscopically guided lumbar puncture utilizing Biofire technique        Procedure was performed by KENYATTA Faith, MIGUELANGEL under the direct supervision of Dr Christopher Glover performed: [id number]         CT head wo contrast   Final Result by Alfreda Baxter [address], DO (11/13 2336)      No acute intracranial abnormality  Workstation performed: [id number]         MRI brain w wo contrast    (Results Pending)    I have personally reviewed pertinent reports     and I have personally reviewed pertinent films in PACS    Active Medications  Scheduled Meds:    Current Facility-Administered Medications:  acetaminophen 650 mg Oral Q6H PRN CHRISTINE Pena    bisacodyl 10 mg Rectal Daily PRN CHRISTINE Pena    chlorhexidine 15 mL Swish & Spit Q12H [address] Misael Doe MD    DOPamine in dextrose 5% 1-20 mcg/kg/min Intravenous Titrated Michelle Ferrara MD Last Rate: 16 mcg/kg/min (11/18/19 0532)   fentanyl citrate (PF) 100 mcg Intravenous Once Misael Doe MD    fentanyl citrate (PF) 50 mcg Intravenous Q1H PRN Misael Doe MD    heparin (porcine) 5,000 Units Subcutaneous Q8H [address] Misael Doe MD    hydrocortisone  Topical BID Misael Doe MD    insulin lispro 1-6 Units Subcutaneous Q6H [address] CHRISTINE Pena    ketamine 4 mg/kg/hr Intravenous Continuous Michelle Ferrara MD Last Rate: 4 mg/kg/hr (11/18/19 0553)   lacosamide (VIMPAT) IVPB 200 mg Intravenous Q12H Misael Doe MD Last Rate: Stopped (11/17/19 1852)   levETIRAcetam 1,750 mg Intravenous Q12H [address] Misael Doe MD Last Rate: 1,750 mg (11/17/19 2035)   methylPREDNISolone sodium succinate 1,000 mg Intravenous Daily Abilio Simmons MD Last Rate: Stopped (11/17/19 1122)   midazolam 70 mg/hr Intravenous Continuous CHRISTINE Pena Last Rate: 70 mg/hr (11/18/19 0556)   midazolam 10 mg Intravenous Once Michelle Ferrara MD    multi-electrolyte 75 mL/hr Intravenous Continuous Jasmeet Avila DO Last Rate: 75 mL/hr (11/18/19 0318)   propofol 80 mcg/kg/min Intravenous Titrated CHRISTINE Pena Last Rate: 80 mcg/kg/min (11/18/19 6777)   senna 17 6 mg Oral BID CHRISTINE Pena    valproate sodium 500 mg Intravenous Q8H Tanya Canseco MD Last Rate: Stopped (11/18/19 0630)     Continuous Infusions:    DOPamine in dextrose 5% 1-20 mcg/kg/min Last Rate: 16 mcg/kg/min (11/18/19 0532)   ketamine 4 mg/kg/hr Last Rate: 4 mg/kg/hr (11/18/19 0553)   midazolam 70 mg/hr Last Rate: 70 mg/hr (11/18/19 0556)   multi-electrolyte 75 mL/hr Last Rate: 75 mL/hr (11/18/19 0318)   propofol 80 mcg/kg/min Last Rate: 80 mcg/kg/min (11/18/19 0624)     PRN Meds:     acetaminophen 650 mg Q6H PRN   bisacodyl 10 mg Daily PRN   fentanyl citrate (PF) 50 mcg Q1H PRN       Allergies   Allergies   Allergen Reactions    Percocet [Oxycodone-Acetaminophen]      Patient tolerated Tylenol in the past in 2016       Advance Directive and Living Will:      Power of :    POLST:        Counseling / Coordination of Care  Total Critical Care time spent 35 minutes excluding procedures, teaching and family updates  CHRISTINE Otto        Portions of the record may have been created with voice recognition software  Occasional wrong word or "sound a like" substitutions may have occurred due to the inherent limitations of voice recognition software    Read the chart carefully and recognize, using context, where substitutions have occurred

## 2019-11-18 NOTE — NUTRITION
Replete K+  In light of Propofol @ 29 6 ml/hr, recommend increase Jevity 1 2 as tolerated to goal rate of 24 ml/hr + 1 PROSOURCE BID to provide qd: 576 ml,811 Total Kcal,62 gm PRO,98 gm CHO,23 gm Fat,465 ml Free H2O,1 0 mg CHO/kg/min  Check Ionized Ca

## 2019-11-18 NOTE — RESPIRATORY THERAPY NOTE
RT Ventilator Management Note  Liborio Kenny 69 y o  female MRN: [id number]  Unit/Bed#: ICU 14 Encounter: [id number]      Daily Screen       11/17/2019  0726 11/18/2019  0803          Patient safety screen outcome[de-identified]  Failed  Failed      Not Ready for Weaning due to[de-identified]  Underline problem not resolved; Recieving paralytics  Underline problem not resolved              Physical Exam:   Assessment Type: (P) Assess only  General Appearance: (P) Sedated  Respiratory Pattern: (P) Assisted  Chest Assessment: (P) Chest expansion symmetrical  Bilateral Breath Sounds: (P) Clear, Diminished  Suction: (P) ET Tube  O2 Device: (P) vent      Resp Comments: (P) Pt  stable on current vent settings  Pt  is currently sedated due to seizures  No SBT at this time   Will continue to monitor

## 2019-11-18 NOTE — TELEPHONE ENCOUNTER
2019-APT CX -PT     2019-PT STILL IN HOSPITAL    2019-PT STILL IN HOSPITAL  SCHEDULED W/DR JOSE ON 2019 AT 1:00pm IN [facility] OFFICE       ----- Message from Rory Dance, PA-C sent at 11/15/2019  4:32 PM EST -----  Regarding: follow up appointment  Patient needs 4 week hospital follow up with [facility]  MRI brain w wo contrast ordered  Thank you!

## 2019-11-19 NOTE — DISCHARGE SUMMARY
Discharge Summary - Homero Almonte 69 y o  female MRN: [id number]    Unit/Bed#: ICU 14 Encounter: [id number] PCP: Dominick Montgomery MD    Admission Date:   Admission Orders (From admission, onward)     Ordered        11/13/19 2221  Inpatient Admission  Once                     Admitting Diagnosis: CNS mass [G96 8]    HPI: Patient is a 77-year-old female chronic smoker with hypertension and diabetes presenting with altered mental status without a clear cause; questionably from metabolic encephalopathy vs UTI  Patient is admitted under professional care of Dr Huma Zimmerman, with an anticipated duration of stay of less than 1 midnight  Procedures Performed:   Orders Placed This Encounter   Procedures   [address]    Intubation       Summary of Hospital Course: Work-up a new lesion was discovered on MRI brain, as well as EEG findings concerning for electrographic seizures  She was being followed by neurology and oncology at [facility], who recommended transfer for possible biopsy of brain neoplasm  Upon arrival at [facility] the patient was evaluated and the intubated for airway protection  Video EEG revealed that she was in status epilepticus and was started on a number of escalating anti seizure medications as well as sedation  Seizure activity was difficult to control and ultimately she was put into a medically induced coma with the intention to slowly wean sedation 24-36 hours later  Over the course of the following days she started to enter into multi organ failure with increasing ventilator requirements, worsening kidney function, and the need for pressors to maintain blood pressure  The family had been present through out and stated multiple times that she did not want to be on extended life support or suffering  With the deteriorating status the decision to withdraw care was made       Significant Findings, Care, Treatment and Services Provided: Intubation, vEEG, Brain MRI    Complications: Hypotension requiring pressors, JOSE MANUEL, increasing vent requirements       Disposition:        Resolved Problems  Date Reviewed: 2019    None

## 2019-11-19 NOTE — PROGRESS NOTES
Extubated by resp tech per son miguel request, , 100mcg fentanyl given  Comfort measures provided, dr Petros Mendoza at bedside, orders noted

## 2019-11-19 NOTE — PROGRESS NOTES
Progress Note - Critical Care   Lisa Gil [facility] 69 y o  female MRN: [id number]  Unit/Bed#: ICU 14 Encounter: [id number]    ------------------------------------------------------------------------------------------------  Chief Complaint: None    HPI/24hr events: Overnight patient found to be hypotensive with MAP in the 50s  Given two doses of albumin without marked improvement and started on levophed  Early this morning entered into a wide complex tachycardia  Given 2g of mg     ----------------------------------------------------------------------------------------------  Assessment and Plan  Principal Problem:    Subclinical status epilepticus ([facility] )  Active Problems:    Controlled type 2 diabetes mellitus without complication, without long-term current use of insulin ([facility])    Essential hypertension    CNS mass    Altered mental status    Visual-spatial impairment    Seizures ([facility])    Hyperchloremic metabolic acidosis    Hypernatremia    Acute kidney injury ([facility] )  Resolved Problems:    * No resolved hospital problems  *        Neuro:   · Status epilepticus  ? Trend neuro exam  ? Burst suppression with:  § Propofol at 80 mcg/kg/min  § Versed at 70 mg/hr  § Ketamine at 4 mg/kg/hr  § No SAT today  ? Currently on continuous video EEG  ? AEDs  § vimpat 200 mg Q 12 hours  § keppra 1750 mg Q 12 hours  § Depacon 500 mg Q 8 hours  § Phenobarbital 400 mg Q 8 x24 hours-completed  ? LP on 11/14-negative  ? Neurology on consult-appreciate recommendations  · Right temporo-occipital lobe cortical thickening  ? Solumedrol 1 gram daily, day 4/5  ? Leukemia/lymphoma flow cytology pending  ? Neurosurgery sign off; no surgery intervention  · Acute toxic/metabolic encephalopathy  ? Likely secondary to status epilepticus requiring burst suppression  ? Trend neuro exam  ? Difficult to assess given need for burst suppression        CV:   · Hypotension  ? Hypotensive with MAP in 50s overnight  ?  Started on levo currently on 24  · Bradycardia  ? Improved overnight, currently in 80-90s  ? Dopamine at 20 mcg/kg/min  ? ECHO completed-EF 65%, no wall abnormalities, mild mitral regurg        Pulm:  · Acute respiratory failure/airway protection  ? Intubated on 11/16 for airway protections  ? AC 20, , PEEP 8, FIO2 70%  ? No SBT today given need for burst suppression  ? Increased vent requirements over last day, repeat cxr  ? abg 7 129/41 3/72 8/13 3 - repeat abg at 0800        GI:   · No acute issues  ? Bowel regimen ordered-senna BID, dulcolax prn  ? tube feedings started 11/17, Jevity 1 2 at 20        :   · JOSE MANUEL  ? Cr increased from 0 71 on 11/16 to 2 35 today  ? Urine output of 75 mL overnight, 860 during day, has been steadily decreasing over past couple of days  ? Net positive 45503 6  ? Strict I&O  ? Trend BUN & creatinine  ? Maintain angel catheter today, consider d/c and void trial when off burst suppression        F/E/N:   · Hypernatremia  ? Sodium 147  ? Trend sodium  ? Free water flushes with tube feeding     Heme/Onc:   · No acute concerns  ? VTE prophylaxis: heparin and SCDs  · History of breast cancer  ? Limb alert left arm        Endo:   · Diabetes mellitus type 2  ? Glucose range 169-214 over past 24 hours  ? High dose steroids likely contributing to hyperglycemia        ID:   · No acute concerns  ? Mild leukocytosis at 17, no acute concern for infection  ? procal 0 16  ? Max temperature 98 6  ? Trend WBCs and temperature curve        MSK/Skin:   · No acute concerns  ? PT and OT consults ordered-resume when appropriate  ? Turn and reposition Q 2 hours  ? Allevyn per protocol    Following discussion with family they wish to withdraw care at this time  [facility] was contacted and did not wish to pursue organ recovery  Disposition: Comfort Care  Code Status: Level 4 - Comfort Care  ---------------------------------------------------------------------------------------  Physical Exam   Constitutional: No distress   She is intubated  HENT:   Head: Normocephalic  Right Ear: External ear normal    Left Ear: External ear normal    Eyes: Conjunctivae are normal  No scleral icterus  Eyes equal, round, nonreactive   Neck: Neck supple  Cardiovascular: Intact distal pulses  An irregular rhythm present  Tachycardia present  Exam reveals no friction rub  No murmur heard  Pulmonary/Chest: No stridor  She is intubated  She has no wheezes  Abdominal: Soft  She exhibits no distension  Genitourinary:   Genitourinary Comments: Sierra in place   Musculoskeletal: She exhibits no deformity  Neurological:   Highly sedated, no reaction to neuro exam   Vitals reviewed  ------------------------------------------------------------------------------------------    Vitals   Vitals:    19 1030 19 1045 19 1050 19 1055   BP:       Pulse: 94 88 82 (!) 0   Resp:       Temp: 100 4 °F (38 °C)  (!) 32 °F (0 °C) (!) 32 °F (0 °C)   TempSrc:       SpO2: (!) 89% (!) 89% (!) 85% (!) 0%   Weight:       Height:         Temp (24hrs), Av 7 °F (33 7 °C), Min:32 °F (0 °C), Max:100 4 °F (38 °C)  Current: Temperature: (!) 32 °F (0 °C)       Invasive/non-invasive ventilation settings   Respiratory    Lab Data (Last 4 hours)    None         O2/Vent Data (Last 4 hours)    None                Height and Weights   Height: 4' 11" (149 9 cm)  IBW: 43 2 kg  Body mass index is 29 88 kg/m²  Weight (last 2 days)     Date/Time   Weight    19 0600   67 1 (147 93)              Hemodynamic Monitoring         Intake and Output  I/O        0701 -  0700  07 -  0700    P  O  0 0    I V  (mL/kg) 5902 4 (88) 6691 3 (99 7)    NG/ 1450    IV Piggyback 1800 1600    Feedings 263 360    Total Intake(mL/kg) 8075 4 (120 3) 74598 3 (150 5)    Urine (mL/kg/hr) 2695 (1 7) 935 (0 6)    Emesis/NG output 0 1500    Total Output 2695 2435    Net +5380 4 +7666 3              UOP:  ml/hr     Nutrition       Diet Orders   (From admission, onward)             Start     Ordered    11/19/19 1031  Diet Regular; Regular House  Diet effective now     Question Answer Comment   Diet Type Regular    Regular Regular House    RD to adjust diet per protocol? No        11/19/19 1035              TF currently running at  ml/hr with a goal of   Formula:    Laboratory and Diagnostics:  Results from last 7 days   Lab Units 11/19/19  0457 11/18/19  2327 11/18/19  0532 11/17/19  1326 11/17/19  0506 11/16/19  0651 11/15/19  0525  11/14/19  0650 11/13/19  0620   WBC Thousand/uL 17 50* 15 35* 10 72*  --  9 00 7 77 10 14  --  10 29* 8 10   HEMOGLOBIN g/dL 14 3 13 9 13 3  --  14 1 14 5 13 8  --  14 0 14 2   HEMATOCRIT % 47 1* 45 2 40 8  --  43 0 45 0 43 4  --  43 1 46 1   PLATELETS Thousands/uL 194 193 173 157 166 158 171   < > 176 174   NEUTROS PCT %  --  89* 86*  --  90* 55 63  --  65 58   MONOS PCT %  --  7 7  --  1* 12 11  --  12 13*    < > = values in this interval not displayed       Results from last 7 days   Lab Units 11/19/19  0457 11/18/19  2327 11/18/19  1824 11/18/19  1253 11/18/19  0532 11/17/19  0506 11/16/19  8545 11/15/19  0525 11/14/19  0650 11/13/19  0620   SODIUM mmol/L 147* 150* 152* 151* 152* 147* 142 142 138 135*   POTASSIUM mmol/L 4 3 4 1 4 0 3 9 3 4* 3 3* 3 9 3 9 4 0 3 9   CHLORIDE mmol/L 122* 124* 126* 125* 124* 117* 111* 110* 108 104   CO2 mmol/L 12* 13* 14* 15* 17* 19* 24 22 22 22   ANION GAP mmol/L 13 13 12 11 11 11 7 10 8 9   BUN mg/dL 16 16 14 12 10 10 8 11 9 8   CREATININE mg/dL 2 35* 2 09* 1 73* 1 39* 1 10 0 84 0 71 0 71 0 79 0 78   CALCIUM mg/dL 7 1* 7 3* 7 6* 7 6* 7 8* 8 8 8 4 8 5 8 8 8 2*   GLUCOSE RANDOM mg/dL 208* 221* 198* 237* 195* 192* 133 104 87 104   ALT U/L 17  --   --   --   --   --  18 21 24 22   AST U/L 31  --   --   --   --   --  16 19 26 25   ALK PHOS U/L 47  --   --   --   --   --  86 95 94 99   ALBUMIN g/dL 2 8*  --   --   --   --   --  2 8* 3 2* 3 4* 3 4*   TOTAL BILIRUBIN mg/dL 0 67  --   --   --   --   --  1 28* 1 20* 1 29* 1 40*     Results from last 7 days   Lab Units 11/19/19  0457 11/18/19  0532 11/17/19  0506 11/16/19  0651 11/15/19  0525 11/14/19  0650 11/13/19  0620   MAGNESIUM mg/dL 2 1 1 8 1 9 2 0 1 9 2 0 1 7   PHOSPHORUS mg/dL  --  2 6 3 2 2 3 2 5 2 9 2 1*      Results from last 7 days   Lab Units 11/14/19  0849   INR  1 10          Results from last 7 days   Lab Units 11/18/19  2327   LACTIC ACID mmol/L 3 9*     ABG:  Results from last 7 days   Lab Units 11/18/19  1824   PH ART  7 129*   PCO2 ART mm Hg 41 1   PO2 ART mm Hg 72 8*   HCO3 ART mmol/L 13 3*   BASE EXC ART mmol/L -15 3   ABG SOURCE  Line, Arterial     VBG:  Results from last 7 days   Lab Units 11/18/19  1824   ABG SOURCE  Line, Arterial     Results from last 7 days   Lab Units 11/18/19  2327   PROCALCITONIN ng/ml 0 16     No results found for: [facility]     EKG: Wide complex tachycardia  Imaging: I have personally reviewed pertinent reports        Micro        Allergies   Allergies   Allergen Reactions    Percocet [Oxycodone-Acetaminophen]      Patient tolerated Tylenol in the past in 2016       Active Medications  Scheduled Meds:    Current Facility-Administered Medications:  fentanyl citrate (PF) 100 mcg Intravenous Once Lori Tamayo MD    fentanyl citrate (PF) 50 mcg Intravenous Q1MIN PRN Sherry Altman MD    glycopyrrolate 0 2 mg Intravenous Q1H PRN CHRISTINE Pena    LORazepam 0 5 mg Intravenous Q1H PRN CHRISTINE Pena    midazolam 70 mg/hr Intravenous Continuous CHRISTINE Pena Last Rate: Stopped (11/19/19 1100)   midazolam 10 mg Intravenous Once Fabiola Duvall MD      Continuous Infusions:    midazolam 70 mg/hr Last Rate: Stopped (11/19/19 1100)     PRN Meds:     fentanyl citrate (PF) 50 mcg Q1MIN PRN   glycopyrrolate 0 2 mg Q1H PRN   LORazepam 0 5 mg Q1H PRN       VTE Pharmacologic Prophylaxis: Heparin  VTE Mechanical Prophylaxis: sequential compression device    Invasive  Devices  Invasive Devices     Central Venous Catheter Line            CVC Central Lines 11/16/19 Triple 16cm 2 days          Peripheral Intravenous Line            Peripheral IV 11/15/19 Right Forearm 3 days          Arterial Line            Arterial Line 11/17/19 Right Radial 1 day          Drain            Urethral Catheter Non-latex 16 Fr  4 days    NG/OG/Enteral Tube Orogastric 16 Fr Center mouth 2 days                Advance Directive and Living Will:      Power of :    POLST:      Counseling / Coordination of [address] [place] [address] MD Sosa    Portions of the record may have been created with voice recognition software  Occasional wrong word or "sound a like" substitutions may have occurred due to the inherent limitations of voice recognition software    Read the chart carefully and recognize, using context, where substitutions have occurred

## 2019-11-19 NOTE — RESPIRATORY THERAPY NOTE
resp care      11/19/19 1045   Respiratory Assessment   Resp Comments Pt  terminally extubated per MD orders

## 2019-11-19 NOTE — DEATH NOTE
INPATIENT DEATH NOTE  Angelica Garcia 69 y o  female MRN: [id number]  Unit/Bed#: ICU 14 Encounter: [id number]         Patient's Information  Pronounced by: Yoli Espinal MD  Did the patient's death occur in the ED?: No  Did the patient's death occur in the OR?: No  Did the patient's death occur less than 10 days post-op?: No  Did the patient's death occur within 24 hours of admission?: No  Was code status DNR at the time of death?: Yes    PHYSICAL EXAM:  Unresponsive to noxious stimuli, Spontaneous respirations absent, Breath sounds absent, Carotid pulse absent, Heart sounds absent, Pupillary light reflex absent and Corneal blink reflex absent    Medical Examiner notification criteria:  NONE APPLICABLE   Medical Examiner's office notified?:  No, does not meet ME notification criteria   Medical Examiner accepted case?:  No  Name of Medical Examiner:          Autopsy Options:  Decision for post-mortem examination not yet made by next of kin      Primary Service Attending Physician notified?:  yes - Attending:  Amber Meadows MD    Physician/Resident responsible for completing Discharge Summary:  Yoli Espinal

## 2019-11-19 NOTE — RESPIRATORY THERAPY NOTE
RT Ventilator Management Note  Rheta Remedies 69 y o  female MRN: [id number]  Unit/Bed#: ICU 14 Encounter: [id number]      Daily Screen       11/18/2019  0803 11/19/2019  0715          Patient safety screen outcome[de-identified]  Failed  Failed      Not Ready for Weaning due to[de-identified]  Underline problem not resolved  FiO2 >60%;PEEP > 8cmH2O;Hemodynamically unstable              Physical Exam:   Assessment Type: (P) Assess only  General Appearance: (P) Sedated  Respiratory Pattern: (P) Assisted  Chest Assessment: (P) Chest expansion symmetrical  Bilateral Breath Sounds: (P) Clear  Suction: (P) ET Tube  O2 Device: vent      Resp Comments: (P) Pt  with declining medical condition  Vent changes made at this time  Awaiting ABG results   No further changes at this time

## 2019-11-19 NOTE — RESPIRATORY THERAPY NOTE
RT Ventilator Management Note  Natanael Aranda [facility] 69 y o  female MRN: [id number]  Unit/Bed#: ICU 14 Encounter: [id number]      Daily Screen       11/17/2019  0726 11/18/2019  0803          Patient safety screen outcome[de-identified]  Failed  Failed      Not Ready for Weaning due to[de-identified]  Underline problem not resolved; Recieving paralytics  Underline problem not resolved              Physical Exam:   Assessment Type: Assess only  General Appearance: Sedated  Respiratory Pattern: Assisted  Chest Assessment: Chest expansion symmetrical  Bilateral Breath Sounds: Diminished, Coarse  O2 Device: vent      Resp Comments: Pt remained on AC/VC mode/settings all evening/night  FiO2 and PEEP increased during the night based on low SpO2

## 2019-11-20 LAB
ACE CSF-CCNC: <1.5 U/L
ATRIAL RATE: 104 BPM
ATRIAL RATE: 49 BPM
ATRIAL RATE: 70 BPM
ATRIAL RATE: 84 BPM
ATRIAL RATE: 86 BPM
ATRIAL RATE: 93 BPM
P AXIS: 17 DEGREES
PR INTERVAL: 1245 MS
PR INTERVAL: 154 MS
PR INTERVAL: 32 MS
QRS AXIS: -11 DEGREES
QRS AXIS: -37 DEGREES
QRS AXIS: -52 DEGREES
QRS AXIS: -57 DEGREES
QRS AXIS: -62 DEGREES
QRS AXIS: 0 DEGREES
QRSD INTERVAL: 154 MS
QRSD INTERVAL: 163 MS
QRSD INTERVAL: 167 MS
QRSD INTERVAL: 171 MS
QRSD INTERVAL: 175 MS
QRSD INTERVAL: 54 MS
QT INTERVAL: 446 MS
QT INTERVAL: 458 MS
QT INTERVAL: 496 MS
QT INTERVAL: 496 MS
QT INTERVAL: 525 MS
QT INTERVAL: 542 MS
QTC INTERVAL: 474 MS
QTC INTERVAL: 555 MS
QTC INTERVAL: 585 MS
QTC INTERVAL: 587 MS
QTC INTERVAL: 594 MS
QTC INTERVAL: 603 MS
T WAVE AXIS: 1 DEGREES
T WAVE AXIS: 168 DEGREES
T WAVE AXIS: 173 DEGREES
T WAVE AXIS: 41 DEGREES
T WAVE AXIS: 47 DEGREES
T WAVE AXIS: 68 DEGREES
VENTRICULAR RATE: 104 BPM
VENTRICULAR RATE: 49 BPM
VENTRICULAR RATE: 70 BPM
VENTRICULAR RATE: 84 BPM
VENTRICULAR RATE: 86 BPM
VENTRICULAR RATE: 93 BPM

## 2019-11-20 PROCEDURE — 95951 PR EEG MONITORING/VIDEORECORD: CPT | Performed by: PSYCHIATRY & NEUROLOGY

## 2019-11-22 LAB
ATRIAL RATE: 59 BPM
P AXIS: 71 DEGREES
PR INTERVAL: 175 MS
QRS AXIS: -52 DEGREES
QRSD INTERVAL: 150 MS
QT INTERVAL: 446 MS
QTC INTERVAL: 442 MS
T WAVE AXIS: -4 DEGREES
VENTRICULAR RATE: 59 BPM

## 2019-11-23 LAB
ATRIAL RATE: 59 BPM
P AXIS: 79 DEGREES
PR INTERVAL: 108 MS
QRS AXIS: -59 DEGREES
QRSD INTERVAL: 154 MS
QT INTERVAL: 450 MS
QTC INTERVAL: 446 MS
T WAVE AXIS: 7 DEGREES
VENTRICULAR RATE: 59 BPM

## 2019-11-26 PROCEDURE — 95951 PR EEG MONITORING/VIDEORECORD: CPT | Performed by: PSYCHIATRY & NEUROLOGY

## 2019-11-29 PROCEDURE — 95951 PR EEG MONITORING/VIDEORECORD: CPT | Performed by: PSYCHIATRY & NEUROLOGY

## 2025-05-13 NOTE — ASSESSMENT & PLAN NOTE
[facility]  part of [facility]    Progress Note    Leena Fernandez Patient Status:  Inpatient    1945 MRN [id number]   Location [facility]5W Attending Leta Gloria Upon arrival, pt's BP was elevated at critical range of 206/84  Blood pressure has been difficult to control  - Will continue to monitor  - Continue patient's daily home BP med lisinopril now at increased dose of 40 mg daily  - hydralazine p r n  Available  - As per Dr Gordon Bourgeois, keep patient normotensive   Will consider adding additional antihypertensives if necessary   Lab 